# Patient Record
Sex: FEMALE | Race: WHITE | NOT HISPANIC OR LATINO | Employment: FULL TIME | URBAN - METROPOLITAN AREA
[De-identification: names, ages, dates, MRNs, and addresses within clinical notes are randomized per-mention and may not be internally consistent; named-entity substitution may affect disease eponyms.]

---

## 2017-06-22 ENCOUNTER — OFFICE VISIT (OUTPATIENT)
Dept: URGENT CARE | Facility: CLINIC | Age: 37
End: 2017-06-22
Payer: COMMERCIAL

## 2017-06-22 VITALS
OXYGEN SATURATION: 96 % | TEMPERATURE: 97.6 F | DIASTOLIC BLOOD PRESSURE: 62 MMHG | HEART RATE: 78 BPM | SYSTOLIC BLOOD PRESSURE: 98 MMHG | RESPIRATION RATE: 14 BRPM

## 2017-06-22 DIAGNOSIS — L55.9 SUNBURN: ICD-10-CM

## 2017-06-22 DIAGNOSIS — R05.8 POST-VIRAL COUGH SYNDROME: ICD-10-CM

## 2017-06-22 PROCEDURE — 99214 OFFICE O/P EST MOD 30 MIN: CPT | Performed by: PHYSICIAN ASSISTANT

## 2017-06-22 RX ORDER — BENZONATATE 100 MG/1
100 CAPSULE ORAL 3 TIMES DAILY PRN
Qty: 30 CAP | Refills: 0 | Status: SHIPPED | OUTPATIENT
Start: 2017-06-22 | End: 2018-10-31

## 2017-06-23 NOTE — PROGRESS NOTES
Chief Complaint   Patient presents with   • Cough     sore throat X 2 weeks        HISTORY OF PRESENT ILLNESS: Patient is a 36 y.o. female who presents today for 2 weeks of sore throat and coughing.   Patient states her cough has been persistent, dry and barking cough.   She states not productive really at all.     The last few days she reports that her sore throat is worsening where it had been improving overall prior.    Patient was getting sick with URI symptoms, sore throat and coughing towards the end of her trip in [place] 2 weeks ago.    She denies fevers or chills at anytime.   The cough is not really keeping her awake.   Her sore throat is better in the morning and she does not feel she is getting a lot of drainage nor is she clearing her throat a lot.  She does not feel SOB.  No chest discomfort.   She does not smoke and takes no medications.  No leg swelling.   Nothing really seeming to modify the symptoms.     Requests refill on silvadene cream she has on hand for sunburns.  Currently one she had two days ago is much better but ran out of cream    Patient Active Problem List    Diagnosis Date Noted   • Other constipation 07/18/2016   • Numerous moles 10/13/2014   • Blood in stool 10/13/2014       Allergies:Review of patient's allergies indicates no known allergies.    Current Outpatient Prescriptions Ordered in [facility]   Medication Sig Dispense Refill   • benzonatate (TESSALON) 100 MG Cap Take 1 Cap by mouth 3 times a day as needed for Cough. 30 Cap 0   • silver sulfADIAZINE (SILVADENE) 1 % Cream Apply 10 g to affected area(s) every day. 100 g 0     No current Epic-ordered facility-administered medications on file.       Past Medical History   Diagnosis Date   • Blood in stool 10/13/2014       Social History   Substance Use Topics   • Smoking status: Never Smoker    • Smokeless tobacco: Never Used   • Alcohol Use: 0.5 oz/week     1 Glasses of wine per week      Comment: rare       Family Status   Relation  Status Death Age   • Father Alive    • Maternal Grandmother     • Mother Alive    • Sister Alive    • Brother Alive    • Maternal Grandfather       accidental    • Paternal Grandmother     • Paternal Grandfather     • Brother Alive      Family History   Problem Relation Age of Onset   • Kidney Disease Father      no dialysis   • Heart Failure Maternal Grandmother    • Heart Disease Maternal Grandmother      chf   • Alzheimer's Disease Maternal Grandmother    • Thyroid Mother      hypothyroid   • Alzheimer's Disease Paternal Grandmother    • Diabetes Neg Hx    • Clotting Disorder Brother      DVT   • Hypertension Brother    • Hyperlipidemia Brother        ROS:  Review of Systems   Constitutional: Negative for fever, chills, weight loss and malaise/fatigue.   HENT:SEE HPI  Eyes: Negative for blurred vision.   Respiratory: SEE HPI  Cardiovascular: Negative for chest pain, palpitations, orthopnea and leg swelling.   Gastrointestinal: Negative for heartburn, nausea, vomiting and abdominal pain.   All other systems reviewed and are negative.       Exam:  Blood pressure 98/62, pulse 78, temperature 36.4 °C (97.6 °F), resp. rate 14, SpO2 96 %.  General:  Well nourished, well developed female in NAD  Eyes: PERRLA, EOM within normal limits, no conjunctival injection, no scleral icterus, visual fields and acuity grossly intact.  Ears: Normal shape and symmetry, no tenderness, no discharge. External canals are without any significant edema or erythema. Tympanic membranes are without any inflammation, no effusion. Gross auditory acuity is intact  Nose: Symmetrical, sinuses without tenderness, no discharge.   Mouth: reasonable hygiene, minimal posterior erythema without exudates or tonsillar enlargement.  Neck: no masses, range of motion within normal limits, no tracheal deviation. No lymphadenopathy  Pulmonary: Normal respiratory effort, no wheezes, crackles, or rhonchi.  Cardiovascular: regular  rate and rhythm without murmurs, rubs, or gallops..  Skin: . Warm, pink, dry.  Healing sunburn legs, trunk.  No active blisters.  No skin breakdown.   Extremities: no clubbing, cyanosis, or edema.  Neuro: A&O x 3. Speech normal/clear.  Normal gait.         Assessment/Plan:  1. Post-viral cough syndrome  benzonatate (TESSALON) 100 MG Cap    silver sulfADIAZINE (SILVADENE) 1 % Cream    POCT Rapid Strep A   2. Sunburn         -strep negative.   -benign exam.  Lungs CTA today.   -consistent with post viral cough syndrome, newer sore throat may be new viral URI.   -recommend salt gargles, fluids, rest.  -declines BMX.   She will use OTC  -declines chest x ray.   She will follow up, call/ RTC if continuing to lack improvement.   -requesting refill on Silvadene cream.  She has on hand in past for sunburns and did get a bad one a few days ago.  Much better but would like more on hand as she ran out today.   -refilled as above.            Supportive care, differential diagnoses, and indications for immediate follow-up discussed with patient.   Pathogenesis of diagnosis discussed including typical length and natural progression.   Instructed to return to clinic or nearest emergency department for any change in condition, further concerns, or worsening of symptoms.  Patient states understanding of the plan of care and discharge instructions.  Instructed to make an appointment, for follow up, with their primary care provider.      Eliz Golden PA-C

## 2017-08-23 ENCOUNTER — HOSPITAL ENCOUNTER (OUTPATIENT)
Dept: LAB | Facility: MEDICAL CENTER | Age: 37
End: 2017-08-23
Payer: COMMERCIAL

## 2017-08-23 LAB
BDY FAT % MEASURED: 23.6 %
BP DIAS: 61 MMHG
BP SYS: 97 MMHG
CHOLEST SERPL-MCNC: 158 MG/DL (ref 100–199)
DIABETES HTDIA: NO
EVENT NAME HTEVT: NORMAL
FASTING HTFAS: YES
GLUCOSE SERPL-MCNC: 92 MG/DL (ref 65–99)
HDLC SERPL-MCNC: 50 MG/DL
HYPERTENSION HTHYP: NO
LDLC SERPL CALC-MCNC: 88 MG/DL
SCREENING LOC CITY HTCIT: NORMAL
SCREENING LOC STATE HTSTA: NORMAL
SCREENING LOCATION HTLOC: NORMAL
SMOKING HTSMO: NO
SUBSCRIBER ID HTSID: NORMAL
TRIGL SERPL-MCNC: 102 MG/DL (ref 0–149)

## 2017-08-23 PROCEDURE — 80061 LIPID PANEL: CPT

## 2017-08-23 PROCEDURE — S5190 WELLNESS ASSESSMENT BY NONPH: HCPCS

## 2017-08-23 PROCEDURE — 82947 ASSAY GLUCOSE BLOOD QUANT: CPT

## 2017-08-23 PROCEDURE — 36415 COLL VENOUS BLD VENIPUNCTURE: CPT

## 2017-10-18 ENCOUNTER — IMMUNIZATION (OUTPATIENT)
Dept: OCCUPATIONAL MEDICINE | Facility: CLINIC | Age: 37
End: 2017-10-18

## 2017-10-18 DIAGNOSIS — Z23 NEED FOR VACCINATION: ICD-10-CM

## 2017-10-18 PROCEDURE — 90686 IIV4 VACC NO PRSV 0.5 ML IM: CPT | Performed by: PREVENTIVE MEDICINE

## 2018-03-13 ENCOUNTER — APPOINTMENT (RX ONLY)
Dept: URBAN - METROPOLITAN AREA CLINIC 4 | Facility: CLINIC | Age: 38
Setting detail: DERMATOLOGY
End: 2018-03-13

## 2018-03-13 DIAGNOSIS — L81.4 OTHER MELANIN HYPERPIGMENTATION: ICD-10-CM

## 2018-03-13 DIAGNOSIS — D22 MELANOCYTIC NEVI: ICD-10-CM

## 2018-03-13 DIAGNOSIS — D18.0 HEMANGIOMA: ICD-10-CM

## 2018-03-13 DIAGNOSIS — L82.1 OTHER SEBORRHEIC KERATOSIS: ICD-10-CM

## 2018-03-13 PROBLEM — D22.62 MELANOCYTIC NEVI OF LEFT UPPER LIMB, INCLUDING SHOULDER: Status: ACTIVE | Noted: 2018-03-13

## 2018-03-13 PROBLEM — D18.01 HEMANGIOMA OF SKIN AND SUBCUTANEOUS TISSUE: Status: ACTIVE | Noted: 2018-03-13

## 2018-03-13 PROBLEM — D22.61 MELANOCYTIC NEVI OF RIGHT UPPER LIMB, INCLUDING SHOULDER: Status: ACTIVE | Noted: 2018-03-13

## 2018-03-13 PROBLEM — D22.5 MELANOCYTIC NEVI OF TRUNK: Status: ACTIVE | Noted: 2018-03-13

## 2018-03-13 PROBLEM — D22.72 MELANOCYTIC NEVI OF LEFT LOWER LIMB, INCLUDING HIP: Status: ACTIVE | Noted: 2018-03-13

## 2018-03-13 PROBLEM — D22.71 MELANOCYTIC NEVI OF RIGHT LOWER LIMB, INCLUDING HIP: Status: ACTIVE | Noted: 2018-03-13

## 2018-03-13 PROCEDURE — ? COUNSELING

## 2018-03-13 PROCEDURE — ? SUNSCREEN RECOMMENDATIONS

## 2018-03-13 PROCEDURE — 99213 OFFICE O/P EST LOW 20 MIN: CPT

## 2018-03-13 ASSESSMENT — LOCATION DETAILED DESCRIPTION DERM
LOCATION DETAILED: RIGHT PROXIMAL DORSAL FOREARM
LOCATION DETAILED: LEFT INFERIOR ANTERIOR NECK
LOCATION DETAILED: LEFT SUPERIOR MEDIAL UPPER BACK
LOCATION DETAILED: LEFT PROXIMAL DORSAL FOREARM
LOCATION DETAILED: SUPERIOR THORACIC SPINE
LOCATION DETAILED: RIGHT CENTRAL MALAR CHEEK
LOCATION DETAILED: LEFT CENTRAL MALAR CHEEK
LOCATION DETAILED: RIGHT ANTERIOR PROXIMAL THIGH
LOCATION DETAILED: RIGHT DISTAL POSTERIOR UPPER ARM
LOCATION DETAILED: RIGHT RIB CAGE
LOCATION DETAILED: LEFT PROXIMAL POSTERIOR UPPER ARM
LOCATION DETAILED: LEFT ANTERIOR PROXIMAL THIGH
LOCATION DETAILED: LEFT ULNAR DORSAL HAND
LOCATION DETAILED: RIGHT RADIAL DORSAL HAND
LOCATION DETAILED: RIGHT SUPERIOR MEDIAL MIDBACK
LOCATION DETAILED: PERIUMBILICAL SKIN

## 2018-03-13 ASSESSMENT — LOCATION SIMPLE DESCRIPTION DERM
LOCATION SIMPLE: RIGHT POSTERIOR UPPER ARM
LOCATION SIMPLE: LEFT HAND
LOCATION SIMPLE: LEFT ANTERIOR NECK
LOCATION SIMPLE: LEFT UPPER BACK
LOCATION SIMPLE: RIGHT THIGH
LOCATION SIMPLE: RIGHT LOWER BACK
LOCATION SIMPLE: RIGHT CHEEK
LOCATION SIMPLE: LEFT POSTERIOR UPPER ARM
LOCATION SIMPLE: UPPER BACK
LOCATION SIMPLE: RIGHT HAND
LOCATION SIMPLE: ABDOMEN
LOCATION SIMPLE: LEFT FOREARM
LOCATION SIMPLE: RIGHT FOREARM
LOCATION SIMPLE: LEFT THIGH
LOCATION SIMPLE: LEFT CHEEK

## 2018-03-13 ASSESSMENT — LOCATION ZONE DERM
LOCATION ZONE: LEG
LOCATION ZONE: NECK
LOCATION ZONE: FACE
LOCATION ZONE: ARM
LOCATION ZONE: HAND
LOCATION ZONE: TRUNK

## 2018-04-11 ENCOUNTER — APPOINTMENT (RX ONLY)
Dept: URBAN - METROPOLITAN AREA CLINIC 20 | Facility: CLINIC | Age: 38
Setting detail: DERMATOLOGY
End: 2018-04-11

## 2018-04-11 DIAGNOSIS — Z41.9 ENCOUNTER FOR PROCEDURE FOR PURPOSES OTHER THAN REMEDYING HEALTH STATE, UNSPECIFIED: ICD-10-CM

## 2018-04-11 PROCEDURE — ? ADDITIONAL NOTES

## 2018-04-11 ASSESSMENT — LOCATION SIMPLE DESCRIPTION DERM: LOCATION SIMPLE: LEFT FOREHEAD

## 2018-04-11 ASSESSMENT — LOCATION DETAILED DESCRIPTION DERM: LOCATION DETAILED: LEFT MEDIAL FOREHEAD

## 2018-04-11 ASSESSMENT — LOCATION ZONE DERM: LOCATION ZONE: FACE

## 2018-04-11 NOTE — PROCEDURE: ADDITIONAL NOTES
Additional Notes: Plan: BBL face and neck. Getting  in July. Quoted $450. Treating cherry angiomas on abdomen and back for free.

## 2018-05-09 ENCOUNTER — APPOINTMENT (RX ONLY)
Dept: URBAN - METROPOLITAN AREA CLINIC 36 | Facility: CLINIC | Age: 38
Setting detail: DERMATOLOGY
End: 2018-05-09

## 2018-05-09 DIAGNOSIS — Z41.9 ENCOUNTER FOR PROCEDURE FOR PURPOSES OTHER THAN REMEDYING HEALTH STATE, UNSPECIFIED: ICD-10-CM

## 2018-05-09 PROCEDURE — ? ADDITIONAL NOTES

## 2018-05-09 PROCEDURE — ? SCITON BBL

## 2018-05-09 ASSESSMENT — LOCATION SIMPLE DESCRIPTION DERM: LOCATION SIMPLE: SUPERIOR FOREHEAD

## 2018-05-09 ASSESSMENT — LOCATION DETAILED DESCRIPTION DERM: LOCATION DETAILED: SUPERIOR MID FOREHEAD

## 2018-05-09 ASSESSMENT — LOCATION ZONE DERM: LOCATION ZONE: FACE

## 2018-05-09 NOTE — PROCEDURE: SCITON BBL
Pulse Duration Units: milliseconds
Post Procedure Text: The patient tolerated the procedure well. Ice-chilled washclothes were applied to the treatment area for comfort. Post care was reviewed with the patient.
Spot Size: Finesse Adapter Size: 15 x 45 mm (No Finesse Adapter)
Pulse Duration: 10
Cooling (In C): 20
Topical Anesthesia?: 23% lidocaine, 7% tetracaine
Repetition Rate (Hz): 1
Fluence (J/Cm2): 12
Length Of Topical Anesthesia Application (Optional): 30 minutes
Preprocedure Text: The treatment areas were thoroughly cleaned. Any exposed at risk hair that was not to be treated was covered in white paper tape. Clear ultrasound gel was applied to the treatment area. The area was treated with no immediate stacking of pulses.
Price (Use Numbers Only, No Special Characters Or $): 450.00
Anesthesia Volume In Cc: 0
Detail Level: Zone
Additional Comments (Optional): 15x15 square on forehead and nose
Post-Care Instructions: I reviewed with the patient in detail post-care instructions. Patient should stay away from the sun and wear sun protection until treated areas are fully healed.
Fluence (J/Cm2): 21
Consent: Written consent obtained, risks reviewed including but not limited to crusting, scabbing, blistering, scarring, darker or lighter pigmentary change, bruising, and/or incomplete response.
Cooling ?: Yes
Cooling (In C): 15
Fluence (J/Cm2): 25
Spot Size: Finesse Adapter Size: 15 x 15 mm square
Spot Size: Finesse Adapter Size: 7 mm round

## 2018-06-06 ENCOUNTER — HOSPITAL ENCOUNTER (OUTPATIENT)
Dept: RADIOLOGY | Facility: MEDICAL CENTER | Age: 38
End: 2018-06-06
Attending: NURSE PRACTITIONER
Payer: COMMERCIAL

## 2018-06-06 DIAGNOSIS — S62.201A CLOSED FRACTURE OF FIRST METACARPAL BONE OF RIGHT HAND, UNSPECIFIED FRACTURE MORPHOLOGY, UNSPECIFIED PORTION OF METACARPAL, INITIAL ENCOUNTER: ICD-10-CM

## 2018-06-06 PROCEDURE — 73130 X-RAY EXAM OF HAND: CPT | Mod: RT

## 2018-06-22 ENCOUNTER — HOSPITAL ENCOUNTER (OUTPATIENT)
Dept: LAB | Facility: MEDICAL CENTER | Age: 38
End: 2018-06-22
Payer: COMMERCIAL

## 2018-06-22 LAB
BDY FAT % MEASURED: 26.6 %
BP DIAS: 55 MMHG
BP SYS: 88 MMHG
CHOLEST SERPL-MCNC: 138 MG/DL (ref 100–199)
DIABETES HTDIA: NO
EVENT NAME HTEVT: NORMAL
FASTING HTFAS: YES
GLUCOSE SERPL-MCNC: 85 MG/DL (ref 65–99)
HDLC SERPL-MCNC: 46 MG/DL
HIP CIRCUMFERENCE HTHC: NORMAL IN
HYPERTENSION HTHYP: NO
LDLC SERPL CALC-MCNC: 76 MG/DL
SCREENING LOC CITY HTCIT: NORMAL
SCREENING LOC STATE HTSTA: NORMAL
SCREENING LOCATION HTLOC: NORMAL
SMOKING HTSMO: NO
SUBSCRIBER ID HTSID: NORMAL
TRIGL SERPL-MCNC: 82 MG/DL (ref 0–149)
WAIST CIRCUMFERENCE HTWC: NORMAL IN

## 2018-06-22 PROCEDURE — 36415 COLL VENOUS BLD VENIPUNCTURE: CPT

## 2018-06-22 PROCEDURE — S5190 WELLNESS ASSESSMENT BY NONPH: HCPCS

## 2018-06-22 PROCEDURE — 82947 ASSAY GLUCOSE BLOOD QUANT: CPT

## 2018-06-22 PROCEDURE — 80061 LIPID PANEL: CPT

## 2018-08-22 ENCOUNTER — HOSPITAL ENCOUNTER (OUTPATIENT)
Dept: RADIOLOGY | Facility: MEDICAL CENTER | Age: 38
End: 2018-08-22
Attending: SURGERY
Payer: COMMERCIAL

## 2018-08-22 DIAGNOSIS — M79.644 THUMB PAIN, RIGHT: ICD-10-CM

## 2018-08-22 PROCEDURE — 73218 MRI UPPER EXTREMITY W/O DYE: CPT | Mod: RT

## 2018-09-11 ENCOUNTER — HOSPITAL ENCOUNTER (OUTPATIENT)
Facility: MEDICAL CENTER | Age: 38
End: 2018-09-11
Attending: SURGERY | Admitting: SURGERY
Payer: COMMERCIAL

## 2018-09-21 ENCOUNTER — IMMUNIZATION (OUTPATIENT)
Dept: OCCUPATIONAL MEDICINE | Facility: CLINIC | Age: 38
End: 2018-09-21

## 2018-09-21 DIAGNOSIS — Z23 NEED FOR VACCINATION: ICD-10-CM

## 2018-09-30 PROCEDURE — 90686 IIV4 VACC NO PRSV 0.5 ML IM: CPT | Performed by: PREVENTIVE MEDICINE

## 2018-10-31 ENCOUNTER — APPOINTMENT (OUTPATIENT)
Dept: ADMISSIONS | Facility: MEDICAL CENTER | Age: 38
End: 2018-10-31
Attending: SURGERY
Payer: COMMERCIAL

## 2018-10-31 RX ORDER — IBUPROFEN 200 MG
200 TABLET ORAL EVERY 6 HOURS PRN
Status: ON HOLD | COMMUNITY
End: 2021-02-13

## 2018-11-07 ENCOUNTER — HOSPITAL ENCOUNTER (OUTPATIENT)
Facility: MEDICAL CENTER | Age: 38
End: 2018-11-07
Attending: SURGERY | Admitting: SURGERY
Payer: COMMERCIAL

## 2018-11-07 VITALS
OXYGEN SATURATION: 99 % | BODY MASS INDEX: 22.07 KG/M2 | WEIGHT: 112.43 LBS | HEIGHT: 60 IN | RESPIRATION RATE: 19 BRPM | TEMPERATURE: 98.1 F | HEART RATE: 68 BPM

## 2018-11-07 DIAGNOSIS — M65.9 SYNOVITIS OF RIGHT HAND: ICD-10-CM

## 2018-11-07 LAB — PATHOLOGY CONSULT NOTE: NORMAL

## 2018-11-07 PROCEDURE — 160028 HCHG SURGERY MINUTES - 1ST 30 MINS LEVEL 3: Performed by: SURGERY

## 2018-11-07 PROCEDURE — 160046 HCHG PACU - 1ST 60 MINS PHASE II: Performed by: SURGERY

## 2018-11-07 PROCEDURE — 88305 TISSUE EXAM BY PATHOLOGIST: CPT

## 2018-11-07 PROCEDURE — 160002 HCHG RECOVERY MINUTES (STAT): Performed by: SURGERY

## 2018-11-07 PROCEDURE — 160025 RECOVERY II MINUTES (STATS): Performed by: SURGERY

## 2018-11-07 PROCEDURE — 501838 HCHG SUTURE GENERAL: Performed by: SURGERY

## 2018-11-07 PROCEDURE — A6222 GAUZE <=16 IN NO W/SAL W/O B: HCPCS | Performed by: SURGERY

## 2018-11-07 PROCEDURE — 160035 HCHG PACU - 1ST 60 MINS PHASE I: Performed by: SURGERY

## 2018-11-07 PROCEDURE — 700111 HCHG RX REV CODE 636 W/ 250 OVERRIDE (IP)

## 2018-11-07 PROCEDURE — 84702 CHORIONIC GONADOTROPIN TEST: CPT

## 2018-11-07 PROCEDURE — 160009 HCHG ANES TIME/MIN: Performed by: SURGERY

## 2018-11-07 PROCEDURE — 500881 HCHG PACK, EXTREMITY: Performed by: SURGERY

## 2018-11-07 PROCEDURE — 160039 HCHG SURGERY MINUTES - EA ADDL 1 MIN LEVEL 3: Performed by: SURGERY

## 2018-11-07 PROCEDURE — 160048 HCHG OR STATISTICAL LEVEL 1-5: Performed by: SURGERY

## 2018-11-07 PROCEDURE — 700101 HCHG RX REV CODE 250

## 2018-11-07 PROCEDURE — 500431 HCHG DRESSING, KLING 2: Performed by: SURGERY

## 2018-11-07 RX ORDER — SODIUM CHLORIDE, SODIUM LACTATE, POTASSIUM CHLORIDE, CALCIUM CHLORIDE 600; 310; 30; 20 MG/100ML; MG/100ML; MG/100ML; MG/100ML
INJECTION, SOLUTION INTRAVENOUS CONTINUOUS
Status: DISCONTINUED | OUTPATIENT
Start: 2018-11-07 | End: 2018-11-07 | Stop reason: HOSPADM

## 2018-11-07 RX ORDER — HYDROCODONE BITARTRATE AND ACETAMINOPHEN 5; 325 MG/1; MG/1
1-2 TABLET ORAL EVERY 4 HOURS PRN
Qty: 20 TAB | Refills: 0 | Status: SHIPPED | OUTPATIENT
Start: 2018-11-07 | End: 2018-11-14

## 2018-11-07 RX ORDER — OXYCODONE HYDROCHLORIDE AND ACETAMINOPHEN 5; 325 MG/1; MG/1
2 TABLET ORAL
Status: DISCONTINUED | OUTPATIENT
Start: 2018-11-07 | End: 2018-11-07 | Stop reason: HOSPADM

## 2018-11-07 RX ORDER — OXYCODONE HYDROCHLORIDE AND ACETAMINOPHEN 5; 325 MG/1; MG/1
1 TABLET ORAL
Status: DISCONTINUED | OUTPATIENT
Start: 2018-11-07 | End: 2018-11-07 | Stop reason: HOSPADM

## 2018-11-07 RX ORDER — SODIUM CHLORIDE, SODIUM LACTATE, POTASSIUM CHLORIDE, CALCIUM CHLORIDE 600; 310; 30; 20 MG/100ML; MG/100ML; MG/100ML; MG/100ML
INJECTION, SOLUTION INTRAVENOUS ONCE
Status: COMPLETED | OUTPATIENT
Start: 2018-11-07 | End: 2018-11-07

## 2018-11-07 RX ORDER — KETOROLAC TROMETHAMINE 30 MG/ML
30 INJECTION, SOLUTION INTRAMUSCULAR; INTRAVENOUS ONCE
Status: DISCONTINUED | OUTPATIENT
Start: 2018-11-07 | End: 2018-11-07 | Stop reason: HOSPADM

## 2018-11-07 RX ORDER — BUPIVACAINE HYDROCHLORIDE AND EPINEPHRINE 5; 5 MG/ML; UG/ML
INJECTION, SOLUTION PERINEURAL
Status: DISCONTINUED | OUTPATIENT
Start: 2018-11-07 | End: 2018-11-07 | Stop reason: HOSPADM

## 2018-11-07 RX ORDER — ONDANSETRON 2 MG/ML
4 INJECTION INTRAMUSCULAR; INTRAVENOUS
Status: DISCONTINUED | OUTPATIENT
Start: 2018-11-07 | End: 2018-11-07 | Stop reason: HOSPADM

## 2018-11-07 RX ADMIN — SODIUM CHLORIDE, SODIUM LACTATE, POTASSIUM CHLORIDE, CALCIUM CHLORIDE: 600; 310; 30; 20 INJECTION, SOLUTION INTRAVENOUS at 08:55

## 2018-11-07 ASSESSMENT — PAIN SCALES - GENERAL
PAINLEVEL_OUTOF10: 0
PAINLEVEL_OUTOF10: 0

## 2018-11-07 NOTE — PROGRESS NOTES
[facility]. Report received and care assumed. Monitors on - VSS, breathing even and unlabored. Rt hand elevated on a pillow with ice applied - NANCY dressing. csm AND cvr GOOD  0952 -  at bedside  1040 discharge instructions reviewed with pt and family. All questions and concerns addressed.  1045 pt discharged  to private car - acc by family

## 2018-11-07 NOTE — DISCHARGE INSTRUCTIONS
ACTIVITY: Rest and take it easy for the first 24 hours.  A responsible adult is recommended to remain with you during that time.  It is normal to feel sleepy.  We encourage you to not do anything that requires balance, judgment or coordination.    MILD FLU-LIKE SYMPTOMS ARE NORMAL. YOU MAY EXPERIENCE GENERALIZED MUSCLE ACHES, THROAT IRRITATION, HEADACHE AND/OR SOME NAUSEA.    FOR 24 HOURS DO NOT:  Drive, operate machinery or run household appliances.  Drink beer or alcoholic beverages.   Make important decisions or sign legal documents.    SPECIAL INSTRUCTIONS:PER MD    DIET: To avoid nausea, slowly advance diet as tolerated, avoiding spicy or greasy foods for the first day.  Add more substantial food to your diet according to your physician's instructions.  Babies can be fed formula or breast milk as soon as they are hungry.  INCREASE FLUIDS AND FIBER TO AVOID CONSTIPATION.    SURGICAL DRESSING/BATHING: PER MD  Okay to remove bandage in 3 days and get incision wet and cover with bandaids.     Elevate above heart and ice frequently for at least 2 weeks. Encourage sedentary use of hand and frequent moving of fingers to prevent stiffness.     No heavy lifting or grasping for at least 4 weeks.     No driving while on narcotic pain medications. Contact auto-insurance carrier for clearance to begin driving again.     Call MD or come to ER for any major concerns.           FOLLOW-UP APPOINTMENT:  A follow-up appointment should be arranged with your doctor in PER MD; call to schedule.    You should CALL YOUR PHYSICIAN if you develop:  Fever greater than 101 degrees F.  Pain not relieved by medication, or persistent nausea or vomiting.  Excessive bleeding (blood soaking through dressing) or unexpected drainage from the wound.  Extreme redness or swelling around the incision site, drainage of pus or foul smelling drainage.  Inability to urinate or empty your bladder within 8 hours.  Problems with breathing or chest  pain.    You should call 911 if you develop problems with breathing or chest pain.  If you are unable to contact your doctor or surgical center, you should go to the nearest emergency room or urgent care center.  Physician's telephone #: [phone]    If any questions arise, call your doctor.  If your doctor is not available, please feel free to call the Surgical Center at [phone].  The Center is open Monday through Friday from 7AM to 7PM.  You can also call the HEALTH HOTLINE open 24 hours/day, 7 days/week and speak to a nurse at [phone], or toll free at [phone].    A registered nurse may call you a few days after your surgery to see how you are doing after your procedure.    MEDICATIONS: Resume taking daily medication.  Take prescribed pain medication with food.  If no medication is prescribed, you may take non-aspirin pain medication if needed.  PAIN MEDICATION CAN BE VERY CONSTIPATING.  Take a stool softener or laxative such as senokot, pericolace, or milk of magnesia if needed.    Prescription given for NORCO.  Last pain medication given at .    If your physician has prescribed pain medication that includes Acetaminophen (Tylenol), do not take additional Acetaminophen (Tylenol) while taking the prescribed medication.    Depression / Suicide Risk    As you are discharged from this [facility], it is important to learn how to keep safe from harming yourself.    Recognize the warning signs:  · Abrupt changes in personality, positive or negative- including increase in energy   · Giving away possessions  · Change in eating patterns- significant weight changes-  positive or negative  · Change in sleeping patterns- unable to sleep or sleeping all the time   · Unwillingness or inability to communicate  · Depression  · Unusual sadness, discouragement and loneliness  · Talk of wanting to die  · Neglect of personal appearance   · Rebelliousness- reckless behavior  · Withdrawal from  people/activities they love  · Confusion- inability to concentrate     If you or a loved one observes any of these behaviors or has concerns about self-harm, here's what you can do:  · Talk about it- your feelings and reasons for harming yourself  · Remove any means that you might use to hurt yourself (examples: pills, rope, extension cords, firearm)  · Get professional help from the community (Mental Health, Substance Abuse, psychological counseling)  · Do not be alone:Call your Safe Contact- someone whom you trust who will be there for you.  · Call your local CRISIS HOTLINE 304-1557 or 905-753-7652  · Call your local Children's Mobile Crisis Response Team [place] (743) 809-0653 or www.[employer]  · Call the toll free National Suicide Prevention Hotlines   · National Suicide Prevention Lifeline 241-616-ZAHO (8981)  · National Hope Line Network 800-SUICIDE (559-2248)

## 2018-11-07 NOTE — OP REPORT
DATE OF SERVICE:  11/07/2018    SURGEON:  Braxton Aggarwal MD    ASSISTANT:  None.    PREOPERATIVE DIAGNOSIS:  Right thumb interphalangeal joint synovitis.    POSTOPERATIVE DIAGNOSES:  Right thumb interphalangeal joint synovitis.    PROCEDURE:  Right thumb exploration and excisional biopsy of IP joint   synovitis.    ANESTHESIOLOGIST:  Leroy Jiménez MD    ANESTHESIA:  General.    COMPLICATIONS:  None noted.    DRAINS:  None.    SPECIMENS:  Synovitic mass from the IP joint.    ESTIMATED BLOOD LOSS:  Minimal.    TOURNIQUET TIME:  Finger tourniquet for approximately 10 minutes.    INDICATIONS:  The patient is a 38-year-old female well known to me through my   outpatient clinic for the above-mentioned diagnosis.  We discussed continued   conservative versus operative treatment.  After discussing advantages and   disadvantages of each, she elected to proceed with the above-mentioned   procedure.  Prior to the procedure, she understood the risks, benefits and   alternatives to surgery.  She understood the risks to include, but not limited   to the risk of infection requiring repeat surgery, bleeding requiring blood   transfusion, nerve, blood vessel, tendon injury requiring repair, chronic   pain, recurrence of mass or finding mass to be malignancy, requiring salvage   procedure of thumb amputation, DVT, pulmonary embolism, heart attack, stroke,   even death.  Patient states despite these risks, she wished to proceed with   surgery.    DESCRIPTION OF PROCEDURE:  The patient was met in the preoperative holding   area.  Her right thumb was initialed as correct operative site.  She had an   opportunity to ask questions, all questions were answered and informed consent   was obtained.    Patient was brought to the operating room and laid supine on the operating   table.  All bony and dependent prominences were padded.  General anesthesia   was induced without complication.  Ancef was administered for infection    prophylaxis.  Right upper extremity was prepped and draped in the usual   sterile fashion.  A formal time-out was performed, which all parties agreed   upon the correct patient, procedure, and operative site.  I began the   procedure by infiltrating subcutaneous tissues with 20 mL of 0.5% Marcaine   with epinephrine.  I then applied finger tourniquet of the thumb.  I made an   L-shaped incision over the visible and palpable mass synovitis emanating from   the IP joint.  I identified and protected the extensor tendon.  Both sides of   the extensor tendon, I excised the capsule and underlying synovium where there   was a synovitic mass on both sides of the tendon.  I then used a rongeur to   remove the synovium from the undersurface of the tendon while preserving the   tendon as well as from the parameter of the joint while preserving underlying   cartilage, which was intact.  I then copiously irrigated the wound with normal   saline and sent the specimen for pathology.  I then remove the finger   tourniquet, used Bovie cautery to achieve hemostasis and closed the incision   with 4-0 nylon suture, covered with sterile bandage.  The patient awoke from   anesthesia without any complication and was transferred in a stable condition   to PACU where there were no immediate post-term complaints.    POSTOPERATIVE PLAN:  The patient will be discharged home per same day   criteria, sedentary use of the upper extremity and follow up in clinic in   10-14 days for suture removal and wound check.       ____________________________________     MD VIDHI Dee / ROSITA    DD:  11/07/2018 10:44:16  DT:  11/07/2018 11:04:54    D#:  [id number]  Job#:  [id number]

## 2018-11-08 LAB
B-HCG FREE SERPL-ACNC: <5 MIU/ML
IHCGL IHCGL: NEGATIVE MIU/ML

## 2019-01-02 ENCOUNTER — APPOINTMENT (OUTPATIENT)
Dept: OTHER | Facility: IMAGING CENTER | Age: 39
End: 2019-01-02

## 2019-01-09 ENCOUNTER — APPOINTMENT (OUTPATIENT)
Dept: OTHER | Facility: IMAGING CENTER | Age: 39
End: 2019-01-09

## 2019-01-21 ENCOUNTER — OFFICE VISIT (OUTPATIENT)
Dept: OTHER | Facility: IMAGING CENTER | Age: 39
End: 2019-01-21
Payer: COMMERCIAL

## 2019-01-21 VITALS
OXYGEN SATURATION: 97 % | RESPIRATION RATE: 12 BRPM | HEIGHT: 60 IN | DIASTOLIC BLOOD PRESSURE: 60 MMHG | TEMPERATURE: 97.9 F | BODY MASS INDEX: 23.42 KG/M2 | WEIGHT: 119.27 LBS | HEART RATE: 68 BPM | SYSTOLIC BLOOD PRESSURE: 90 MMHG

## 2019-01-21 DIAGNOSIS — Z00.00 HEALTH CARE MAINTENANCE: ICD-10-CM

## 2019-01-21 DIAGNOSIS — Z31.69 ENCOUNTER FOR PRECONCEPTION CONSULTATION: ICD-10-CM

## 2019-01-21 DIAGNOSIS — M65.9 SYNOVITIS OF FINGER: ICD-10-CM

## 2019-01-21 DIAGNOSIS — M25.612 DECREASED RANGE OF MOTION OF LEFT SHOULDER: ICD-10-CM

## 2019-01-21 PROCEDURE — 99204 OFFICE O/P NEW MOD 45 MIN: CPT | Performed by: INTERNAL MEDICINE

## 2019-01-21 ASSESSMENT — PAIN SCALES - GENERAL: PAINLEVEL: 3=SLIGHT PAIN

## 2019-01-21 ASSESSMENT — PATIENT HEALTH QUESTIONNAIRE - PHQ9: CLINICAL INTERPRETATION OF PHQ2 SCORE: 0

## 2019-01-21 NOTE — LETTER
[facility]  Erik Vazquez M.D.  [address] [address]  Fax: [phone]   Authorization for Release/Disclosure of   Protected Health Information   Name: PAULINE DESAI : 1980 SSN: [id number]   Address: [address] [facility] [address] Phone:    [phone] (home)    I authorize the entity listed below to release/disclose the PHI below to:   [facility]/Erik Vazquez M.D. and Caridad Tony D.O.   Provider or Entity Name:     Address   City, State, Zip   Phone:      Fax:     Reason for request: continuity of care   Information to be released:    [  ] LAST COLONOSCOPY,  including any PATH REPORT and follow-up  [  ] LAST FIT/COLOGUARD RESULT [  ] LAST DEXA  [  ] LAST MAMMOGRAM  [  ] LAST PAP  [  ] LAST LABS [  ] RETINA EXAM REPORT  [  ] IMMUNIZATION RECORDS  [  ] Release all info      [  ] Check here and initial the line next to each item to release ALL health information INCLUDING  _____ Care and treatment for drug and / or alcohol abuse  _____ HIV testing, infection status, or AIDS  _____ Genetic Testing    DATES OF SERVICE OR TIME PERIOD TO BE DISCLOSED: _____________  I understand and acknowledge that:  * This Authorization may be revoked at any time by you in writing, except if your health information has already been used or disclosed.  * Your health information that will be used or disclosed as a result of you signing this authorization could be re-disclosed by the recipient. If this occurs, your re-disclosed health information may no longer be protected by State or Federal laws.  * You may refuse to sign this Authorization. Your refusal will not affect your ability to obtain treatment.  * This Authorization becomes effective upon signing and will  on (date) __________.      If no date is indicated, this Authorization will  one (1) year from the signature date.    Name: Pauline Desai    Signature:   Date:     2019       PLEASE FAX REQUESTED RECORDS BACK TO:  [phone]

## 2019-01-22 NOTE — PROGRESS NOTES
"Chief Complaint   Patient presents with   • Finger Pain     right thumb since surgery 11/7/18 (following pain that began in 04/18)   • Shoulder Pain     left shoulder, chronic pain-overuse injury. loss of ROM in left shoulder     Paluine Hoffman is a 38 y.o. female who usually sees Erik Vazquez M.D. presents today to establish care at [facility] and to discuss.    HPI:  Patient complains of right thumb pain that occurred since April 2018 after some yard work.  She did not recall any specific injury.  Her thumb became swollen the next week and continue to be irritated in the distal interphalangeal joint.  Has difficulty with flexion of the joint.  She had treated conservatively with ice and NSAIDs which did not seem to help with swelling much.  After 1-1/2 months was seen by her primary care doctor and had an x-ray which showed essentially normal results.  Had follow-up with orthopedist, Dr. Roman and MRI done.  Insurance denied needle biopsy procedure and she was recommended to have surgery (right thumb exploration and excisional biopsy of IP joint synovitis) which was done in November 2018.  Findings showed: Right thumb interphalangeal joint synovitis.   Current symptoms: Pain is 4-5 out of 10 and sometimes 8 out of 10.  Location at DIP joint on extensor side as well as on flexor side.  Patient is right-handed and finds it painful to flex thumb at DIP.  Found that her range of motion is restricted more than prior to the surgery.  Has recently started hand therapy which she is continuing to attend twice a week.  Having some increase in range of motion with this.  She has had 2 sessions of acupuncture and has not noticed much improvement yet.  Continues to use ibuprofen as needed 400-1200-day.  Previously used Norco but did not like the side effects.  Briefly try to CBD oil with some temporary relief.  Has been using glucosamine supplement \"Move free\" since surgery.    MRI right thumb 8/22/2018  Marrow edema " and periostitis with surrounding soft tissue edema involving the first proximal phalanx diffusely. In the absence of trauma, differential diagnosis includes osteomyelitis as well as an underlying neoplastic process.    Patient also complains of left shoulder/scapular discomfort over the last few years.  Also has decreased range of motion through the patient used to be a diver and a gymnast.  She used to be active in acro-yoga but unable to do it due to decreased function-some difficulty with bearing weight and pushing through range of motion. Difficulty with overhead activities due to decreased range of motion.  Denies neck pain or upper back pain.  Mild weakness on left, no paresthesias.  States that her father is a physical therapist and had given her some exercises to do for possible scapular problem.  Denies lower back or upper back pain.    Newly . Preparing to conceive for first child and requesting recommendation for prenatal vitamin.    Health care maintenance  Health Maintenance Due   Topic Date Due   • PAP SMEAR  11/01/2018   Seeing dermatology for regular skin exams.  Has gynecologist.  Last Pap February 2018.  Scheduled for April 2019.  Biometrics done in June 2018    Lifestyle  Exercise: skiing, light weights, yoga - summer: kayaking  Diet: lean proteins. Usually skips breakfast. See ipc intake form for details.  Sleep: regular 7-8 hrs a night on average - usually more on weekends. Fall asleep fast - no issues.  Relaxation: anything on the water, travelling.  Relationships:  Supportive family - friend network (here and out of state). Lives with spouse and Raymond Richardson.  Current Stressors: None  Prior experience with complementary therapies: NAET allergy elimination, bowel issues - acupuncture, amnual therapy, craniosacral and myofascial release, yoga and meditation    Depression Screening    Little interest or pleasure in doing things?  0 - not at all  Feeling down, depressed , or  hopeless? 0 - not at all  Patient Health Questionnaire Score: 0    If depressive symptoms identified deferred to follow up visit unless specifically addressed in assesment and plan.    Interpretation of PHQ-9 Total Score   Score Severity   1-4 Minimal Depression   5-9 Mild Depression   10-14 Moderate Depression   15-19 Moderately Severe Depression   20-27 Severe Depression     Current medicines (including changes today)  Current Outpatient Prescriptions   Medication Sig Dispense Refill   • ibuprofen (MOTRIN) 200 MG Tab Take 200 mg by mouth every 6 hours as needed.       No current facility-administered medications for this visit.      She  has a past medical history of Blood in stool (10/13/2014). She also has no past medical history of Anemia; Anxiety; Arrhythmia; Asthma; Blood transfusion without reported diagnosis; Cancer (HCC); Cataract; CHF (congestive heart failure) (HCC); Clotting disorder (HCC); COPD (chronic obstructive pulmonary disease) (HCC); Depression; Diabetes (HCC); Diabetic neuropathy (HCC); GERD (gastroesophageal reflux disease); Glaucoma; Goiter; Heart attack (HCC); Heart murmur; Hyperlipidemia; Hypertension; IBD (inflammatory bowel disease); Kidney disease; Migraine; Muscle disorder; Osteoporosis; Pulmonary emphysema (HCC); Seizure (HCC); Stroke (HCC); Substance abuse (HCC); or Tuberculosis.  She  has a past surgical history that includes leep (2007) and finger exploration (Right, 11/7/2018).  Social History   Substance Use Topics   • Smoking status: Never Smoker   • Smokeless tobacco: Never Used   • Alcohol use 0.5 oz/week     1 Glasses of wine per week      Comment: 1-2 per week     Family History   Problem Relation Age of Onset   • Kidney Disease Father 65        no dialysis   • Heart Failure Maternal Grandmother    • Heart Disease Maternal Grandmother         chf   • Alzheimer's Disease Maternal Grandmother    • Thyroid Mother         hypothyroid   • Hypertension Mother    • Hyperlipidemia  Mother    • No Known Problems Sister    • Alzheimer's Disease Paternal Grandmother    • Lung Cancer Paternal Grandfather    • Clotting Disorder Brother         DVT   • Hypertension Brother    • Hyperlipidemia Brother    • GI Brother         bowel obstruction   • Diabetes Neg Hx      Family Status   Relation Status   • Fa Alive   • MGMo    • Mo Alive   • Sis Alive   • Bro Alive   • MGFa         accidental    • PGMo    • PGFa    • Bro Alive   • Neg Hx (Not Specified)     Social History     Social History Narrative   • No narrative on file     ROS  Constitutional: Negative for fever, chills, weight loss and malaise/fatigue.   HENT: Negative for ear pain, nosebleeds, congestion, sore throat and neck pain.    Eyes: Negative for blurred vision.   Respiratory: Negative for cough, sputum production, shortness of breath and wheezing.    Cardiovascular: Negative for chest pain, palpitations, orthopnea and leg swelling.   Gastrointestinal: Negative for heartburn, nausea, vomiting and abdominal pain. Constipation more often.   Genitourinary: Negative for dysuria, urgency and frequency.   Musculoskeletal: Negative for myalgias, back pain and thumb joint pain as per hpi.   Skin: Negative for rash and itching.   Neurological: Negative for dizziness, tingling, tremors, sensory change, focal weakness and headaches.   Endo/Heme/Allergies: Does not bruise/bleed easily.   Psychiatric/Behavioral: Negative for depression, anxiety, or memory loss.     All other systems reviewed and are negative except as in HPI.    Labs reviewed with patient:  Lab Results   Component Value Date/Time    WBC 8.3 2016 10:27 AM    RBC 4.35 2016 10:27 AM    HEMOGLOBIN 14.3 2016 10:27 AM    HEMATOCRIT 41.6 2016 10:27 AM    MCV 95.6 2016 10:27 AM    MCH 32.9 2016 10:27 AM    MCHC 34.4 2016 10:27 AM    MPV 11.1 2016 10:27 AM    NEUTSPOLYS 66.70 2016 10:27 AM    LYMPHOCYTES 23.10  07/18/2016 10:27 AM    MONOCYTES 7.70 07/18/2016 10:27 AM    EOSINOPHILS 1.40 07/18/2016 10:27 AM    BASOPHILS 0.60 07/18/2016 10:27 AM      Lab Results   Component Value Date/Time    SODIUM 139 09/08/2014 12:06 PM    POTASSIUM 4.8 09/08/2014 12:06 PM    CHLORIDE 108 09/08/2014 12:06 PM    CO2 26 09/08/2014 12:06 PM    GLUCOSE 85 06/22/2018 07:57 AM    BUN 13 09/08/2014 12:06 PM    CREATININE 0.77 09/08/2014 12:06 PM    ALKPHOSPHAT 53 09/08/2014 12:06 PM    ASTSGOT 14 09/08/2014 12:06 PM    ALTSGPT 10 09/08/2014 12:06 PM    TBILIRUBIN 1.0 09/08/2014 12:06 PM     Lab Results   Component Value Date/Time    CHOLSTRLTOT 138 06/22/2018 07:57 AM    CHOLSTRLTOT 158 08/23/2017 10:58 AM     Lab Results   Component Value Date/Time    LDL 76 06/22/2018 07:57 AM    LDL 88 08/23/2017 10:58 AM     Lab Results   Component Value Date/Time    HDL 46 06/22/2018 07:57 AM    HDL 50 08/23/2017 10:58 AM     Lab Results   Component Value Date/Time    TRIGLYCERIDE 82 06/22/2018 07:57 AM    TRIGLYCERIDE 102 08/23/2017 10:58 AM     No results found for: HBA1C  Lab Results   Component Value Date/Time    TSHULTRASEN 0.700 09/08/2014 12:06 PM        Objective:   Blood pressure (!) 90/60, pulse 68, temperature 36.6 °C (97.9 °F), resp. rate 12, height 1.524 m (5'), weight 54.1 kg (119 lb 4.3 oz), last menstrual period 01/13/2019, SpO2 97 %, not currently breastfeeding. Body mass index is 23.29 kg/m².  Physical Exam:  Constitutional: Alert, no distress.  Skin: Warm, dry, good turgor, no rashes in visible areas.  Eye: Equal, round and reactive, conjunctiva clear, lids normal.  ENMT: Lips without lesions, good dentition, oropharynx clear.  Neck: Trachea midline, no masses, no thyromegaly.  Respiratory: Unlabored respiratory effort, lungs clear to auscultation, no wheezes, no ronchi.  Cardiovascular: Normal S1, S2, no murmur, no edema.  Abdomen: Soft, non-tender, no masses, no hepatosplenomegaly.  Psych: Alert and oriented x3, normal affect and  mood.  Shoulder Exam: no swelling, tenderness, instability; ligaments intact, FROM shoulder joint, no bicipital groove tenderness, no impingements signs, remainder of shoulder exam is normal, contralateral shoulder exam is normal. Passive ROM: forward flexion (160`), external rotation (60`) internal rotation (T12)  MSK: Swelling of right DIP first digit and decreased flexion (passive and active)    Assessment and Plan:   The following treatment plan was discussed  1. Decreased range of motion of left shoulder  DX-SHOULDER 2+ LEFT    DX-THORACIC SPINE-2 VIEWS   2. Synovitis of finger     3. Encounter for preconception consultation     4. Health care maintenance  COMP METABOLIC PANEL     1. Plain films ordered for further evaluation. Differential diagnosis: subacromial pathology, adhesive capsulitis, thoracic spine problem or musculoskeletal dysfunction. Discussed initial treatment options including PT, trial of acupuncture and/or OMT.  2. Right thumb interphalangeal joint synovitis s/p right thumb exploration and excisional biopsy of IP joint. Ibuprofen as needed. Discussed trial of SAMe which may help reduce pain and may decrease need for frequent NSAID. She could continue glucosamine for now. Check renal function.  3. Discussed recommended amount of folate, iodine and iron, choline to look for in prenatal vitamin.   4. Discussed CDC recommendations for immunizations and USPSTF guidelines for screening exams.      Records requested.  Followup: Return in about 1 month (around 2/21/2019), or if symptoms worsen or fail to improve, for follow-up with PCP.    Spent 45 minutes in face-to-tace patient contact in which greater than 50% of the visit was spent in counseling and coordination of care including patient education about treatment options, discussed the benefits and utility of acupuncture.  I answered patient questions about efficacy, safety, and what to expect during a treatment, and the likely number of  treatments needed. We also reviewed PMH, family history, and each of her chronic diagnoses in regards to current management, specialty physicians, symptom control, and future planning.  Please refer to assessment and plan/discussion/recommendations for additional details.        I have worked with consultants from the vendor as well as technical experts from [employer] to optimize the interface. I have made every reasonable attempt to correct obvious errors, but I expect that there are errors of grammar and possibly content that I did not discover before finalizing the note.

## 2019-01-23 ENCOUNTER — APPOINTMENT (OUTPATIENT)
Dept: OTHER | Facility: IMAGING CENTER | Age: 39
End: 2019-01-23

## 2019-01-29 NOTE — ASSESSMENT & PLAN NOTE
Health Maintenance Due   Topic Date Due   • PAP SMEAR  11/01/2018   Seeing dermatology for regular skin exams.  Has gynecologist.  Last Pap February 2018.  Scheduled for April 2019.  Biometrics done in June 2018    Lifestyle  Exercise: skiing, light weights, yoga - summer: kayaking  Diet: lean proteins. Usually skips breakfast. See ipc intake form for details.  Sleep: regular 7-8 hrs a night on average - usually more on weekends. Fall asleep fast - no issues.  Relaxation: anything on the water, travelling.  Relationships:  Supportive family - friend network (here and out of state). Lives with spouse and Raymond Richardson.  Current Stressors: None  Prior experience with complementary therapies: NAET allergy elimination, bowel issues - acupuncture, amnual therapy, craniosacral and myofascial release, yoga and meditation

## 2019-01-30 ENCOUNTER — APPOINTMENT (OUTPATIENT)
Dept: OTHER | Facility: IMAGING CENTER | Age: 39
End: 2019-01-30

## 2019-02-07 ENCOUNTER — HOSPITAL ENCOUNTER (OUTPATIENT)
Dept: LAB | Facility: MEDICAL CENTER | Age: 39
End: 2019-02-07
Attending: INTERNAL MEDICINE
Payer: COMMERCIAL

## 2019-02-07 DIAGNOSIS — Z00.00 HEALTH CARE MAINTENANCE: ICD-10-CM

## 2019-02-07 LAB
ALBUMIN SERPL BCP-MCNC: 4.3 G/DL (ref 3.2–4.9)
ALBUMIN/GLOB SERPL: 1.5 G/DL
ALP SERPL-CCNC: 50 U/L (ref 30–99)
ALT SERPL-CCNC: 12 U/L (ref 2–50)
ANION GAP SERPL CALC-SCNC: 6 MMOL/L (ref 0–11.9)
AST SERPL-CCNC: 14 U/L (ref 12–45)
BILIRUB SERPL-MCNC: 0.7 MG/DL (ref 0.1–1.5)
BUN SERPL-MCNC: 13 MG/DL (ref 8–22)
CALCIUM SERPL-MCNC: 9.6 MG/DL (ref 8.5–10.5)
CHLORIDE SERPL-SCNC: 109 MMOL/L (ref 96–112)
CO2 SERPL-SCNC: 25 MMOL/L (ref 20–33)
CREAT SERPL-MCNC: 0.73 MG/DL (ref 0.5–1.4)
FASTING STATUS PATIENT QL REPORTED: NORMAL
GLOBULIN SER CALC-MCNC: 2.8 G/DL (ref 1.9–3.5)
GLUCOSE SERPL-MCNC: 87 MG/DL (ref 65–99)
POTASSIUM SERPL-SCNC: 4.7 MMOL/L (ref 3.6–5.5)
PROT SERPL-MCNC: 7.1 G/DL (ref 6–8.2)
SODIUM SERPL-SCNC: 140 MMOL/L (ref 135–145)

## 2019-02-07 PROCEDURE — 36415 COLL VENOUS BLD VENIPUNCTURE: CPT

## 2019-02-07 PROCEDURE — 80053 COMPREHEN METABOLIC PANEL: CPT

## 2019-02-10 ENCOUNTER — HOSPITAL ENCOUNTER (OUTPATIENT)
Dept: RADIOLOGY | Facility: MEDICAL CENTER | Age: 39
End: 2019-02-10
Attending: INTERNAL MEDICINE
Payer: COMMERCIAL

## 2019-02-10 DIAGNOSIS — M25.612 DECREASED RANGE OF MOTION OF LEFT SHOULDER: ICD-10-CM

## 2019-02-10 PROCEDURE — 72070 X-RAY EXAM THORAC SPINE 2VWS: CPT

## 2019-02-10 PROCEDURE — 73030 X-RAY EXAM OF SHOULDER: CPT | Mod: LT

## 2019-03-11 ENCOUNTER — TELEPHONE (OUTPATIENT)
Dept: OTHER | Facility: IMAGING CENTER | Age: 39
End: 2019-03-11

## 2019-03-11 ENCOUNTER — NON-PROVIDER VISIT (OUTPATIENT)
Dept: OCCUPATIONAL MEDICINE | Facility: CLINIC | Age: 39
End: 2019-03-11

## 2019-03-11 DIAGNOSIS — Z11.1 ENCOUNTER FOR PPD TEST: ICD-10-CM

## 2019-03-11 PROCEDURE — 86580 TB INTRADERMAL TEST: CPT | Performed by: PREVENTIVE MEDICINE

## 2019-03-11 NOTE — TELEPHONE ENCOUNTER
Voicemail received from pt inquiring if Dr. Tony can fill out paperwork for employment. Advised pt to send an attachment via [employer] for Dr. Tony to review.

## 2019-03-13 ENCOUNTER — NON-PROVIDER VISIT (OUTPATIENT)
Dept: OCCUPATIONAL MEDICINE | Facility: CLINIC | Age: 39
End: 2019-03-13

## 2019-03-13 DIAGNOSIS — Z11.1 ENCOUNTER FOR PPD SKIN TEST READING: ICD-10-CM

## 2019-03-13 LAB — TB WHEAL 3D P 5 TU DIAM: NORMAL MM

## 2019-03-18 ENCOUNTER — NON-PROVIDER VISIT (OUTPATIENT)
Dept: OCCUPATIONAL MEDICINE | Facility: CLINIC | Age: 39
End: 2019-03-18

## 2019-03-18 DIAGNOSIS — Z11.1 ENCOUNTER FOR PPD TEST: ICD-10-CM

## 2019-03-18 PROCEDURE — 86580 TB INTRADERMAL TEST: CPT | Performed by: PREVENTIVE MEDICINE

## 2019-03-20 ENCOUNTER — NON-PROVIDER VISIT (OUTPATIENT)
Dept: OCCUPATIONAL MEDICINE | Facility: CLINIC | Age: 39
End: 2019-03-20

## 2019-03-20 DIAGNOSIS — Z11.1 ENCOUNTER FOR PPD SKIN TEST READING: ICD-10-CM

## 2019-03-20 LAB — TB WHEAL 3D P 5 TU DIAM: NORMAL MM

## 2019-03-29 ENCOUNTER — HOSPITAL ENCOUNTER (OUTPATIENT)
Dept: LAB | Facility: MEDICAL CENTER | Age: 39
End: 2019-03-29
Attending: FAMILY MEDICINE
Payer: COMMERCIAL

## 2019-03-29 LAB
25(OH)D3 SERPL-MCNC: 29 NG/ML (ref 30–100)
ALBUMIN SERPL BCP-MCNC: 4 G/DL (ref 3.2–4.9)
ALBUMIN/GLOB SERPL: 1.5 G/DL
ALP SERPL-CCNC: 45 U/L (ref 30–99)
ALT SERPL-CCNC: 12 U/L (ref 2–50)
ANION GAP SERPL CALC-SCNC: 8 MMOL/L (ref 0–11.9)
AST SERPL-CCNC: 17 U/L (ref 12–45)
BASOPHILS # BLD AUTO: 0.6 % (ref 0–1.8)
BASOPHILS # BLD: 0.04 K/UL (ref 0–0.12)
BILIRUB SERPL-MCNC: 0.7 MG/DL (ref 0.1–1.5)
BUN SERPL-MCNC: 13 MG/DL (ref 8–22)
CALCIUM SERPL-MCNC: 8.9 MG/DL (ref 8.5–10.5)
CHLORIDE SERPL-SCNC: 112 MMOL/L (ref 96–112)
CHOLEST SERPL-MCNC: 151 MG/DL (ref 100–199)
CO2 SERPL-SCNC: 21 MMOL/L (ref 20–33)
CREAT SERPL-MCNC: 0.66 MG/DL (ref 0.5–1.4)
EOSINOPHIL # BLD AUTO: 0.1 K/UL (ref 0–0.51)
EOSINOPHIL NFR BLD: 1.5 % (ref 0–6.9)
ERYTHROCYTE [DISTWIDTH] IN BLOOD BY AUTOMATED COUNT: 41.1 FL (ref 35.9–50)
FASTING STATUS PATIENT QL REPORTED: NORMAL
FERRITIN SERPL-MCNC: 47.8 NG/ML (ref 10–291)
GLOBULIN SER CALC-MCNC: 2.7 G/DL (ref 1.9–3.5)
GLUCOSE SERPL-MCNC: 87 MG/DL (ref 65–99)
HCT VFR BLD AUTO: 41.2 % (ref 37–47)
HDLC SERPL-MCNC: 51 MG/DL
HGB BLD-MCNC: 14 G/DL (ref 12–16)
IMM GRANULOCYTES # BLD AUTO: 0.02 K/UL (ref 0–0.11)
IMM GRANULOCYTES NFR BLD AUTO: 0.3 % (ref 0–0.9)
IRON SATN MFR SERPL: 55 % (ref 15–55)
IRON SERPL-MCNC: 181 UG/DL (ref 40–170)
LDLC SERPL CALC-MCNC: 81 MG/DL
LYMPHOCYTES # BLD AUTO: 1.84 K/UL (ref 1–4.8)
LYMPHOCYTES NFR BLD: 27.9 % (ref 22–41)
MCH RBC QN AUTO: 32.9 PG (ref 27–33)
MCHC RBC AUTO-ENTMCNC: 34 G/DL (ref 33.6–35)
MCV RBC AUTO: 96.9 FL (ref 81.4–97.8)
MONOCYTES # BLD AUTO: 0.56 K/UL (ref 0–0.85)
MONOCYTES NFR BLD AUTO: 8.5 % (ref 0–13.4)
NEUTROPHILS # BLD AUTO: 4.03 K/UL (ref 2–7.15)
NEUTROPHILS NFR BLD: 61.2 % (ref 44–72)
NRBC # BLD AUTO: 0 K/UL
NRBC BLD-RTO: 0 /100 WBC
PLATELET # BLD AUTO: 217 K/UL (ref 164–446)
PMV BLD AUTO: 11 FL (ref 9–12.9)
POTASSIUM SERPL-SCNC: 4.2 MMOL/L (ref 3.6–5.5)
PROT SERPL-MCNC: 6.7 G/DL (ref 6–8.2)
RBC # BLD AUTO: 4.25 M/UL (ref 4.2–5.4)
SODIUM SERPL-SCNC: 141 MMOL/L (ref 135–145)
T3FREE SERPL-MCNC: 3.37 PG/ML (ref 2.4–4.2)
T4 FREE SERPL-MCNC: 0.86 NG/DL (ref 0.53–1.43)
TIBC SERPL-MCNC: 329 UG/DL (ref 250–450)
TRANSFERRIN SERPL-MCNC: 240 MG/DL (ref 200–370)
TRIGL SERPL-MCNC: 95 MG/DL (ref 0–149)
TSH SERPL DL<=0.005 MIU/L-ACNC: 1.24 UIU/ML (ref 0.38–5.33)
WBC # BLD AUTO: 6.6 K/UL (ref 4.8–10.8)

## 2019-03-29 PROCEDURE — 86376 MICROSOMAL ANTIBODY EACH: CPT

## 2019-03-29 PROCEDURE — 83550 IRON BINDING TEST: CPT

## 2019-03-29 PROCEDURE — 85025 COMPLETE CBC W/AUTO DIFF WBC: CPT

## 2019-03-29 PROCEDURE — 84439 ASSAY OF FREE THYROXINE: CPT

## 2019-03-29 PROCEDURE — 82728 ASSAY OF FERRITIN: CPT

## 2019-03-29 PROCEDURE — 36415 COLL VENOUS BLD VENIPUNCTURE: CPT

## 2019-03-29 PROCEDURE — 84443 ASSAY THYROID STIM HORMONE: CPT

## 2019-03-29 PROCEDURE — 83540 ASSAY OF IRON: CPT

## 2019-03-29 PROCEDURE — 80053 COMPREHEN METABOLIC PANEL: CPT

## 2019-03-29 PROCEDURE — 82306 VITAMIN D 25 HYDROXY: CPT

## 2019-03-29 PROCEDURE — 84466 ASSAY OF TRANSFERRIN: CPT

## 2019-03-29 PROCEDURE — 84481 FREE ASSAY (FT-3): CPT

## 2019-03-29 PROCEDURE — 80061 LIPID PANEL: CPT

## 2019-03-29 PROCEDURE — 86800 THYROGLOBULIN ANTIBODY: CPT

## 2019-04-01 LAB
THYROGLOB AB SERPL-ACNC: <0.9 IU/ML (ref 0–4)
THYROPEROXIDASE AB SERPL-ACNC: 0.4 IU/ML (ref 0–9)

## 2019-04-06 ENCOUNTER — HOSPITAL ENCOUNTER (OUTPATIENT)
Dept: LAB | Facility: MEDICAL CENTER | Age: 39
End: 2019-04-06
Attending: FAMILY MEDICINE
Payer: COMMERCIAL

## 2019-04-06 LAB
FERRITIN SERPL-MCNC: 43.5 NG/ML (ref 10–291)
TRANSFERRIN SERPL-MCNC: 223 MG/DL (ref 200–370)

## 2019-04-06 PROCEDURE — 81256 HFE GENE: CPT

## 2019-04-06 PROCEDURE — 83550 IRON BINDING TEST: CPT

## 2019-04-06 PROCEDURE — 83540 ASSAY OF IRON: CPT

## 2019-04-06 PROCEDURE — 82728 ASSAY OF FERRITIN: CPT

## 2019-04-06 PROCEDURE — 84466 ASSAY OF TRANSFERRIN: CPT

## 2019-04-06 PROCEDURE — 36415 COLL VENOUS BLD VENIPUNCTURE: CPT

## 2019-04-07 LAB
IRON SATN MFR SERPL: 70 % (ref 15–55)
IRON SERPL-MCNC: 216 UG/DL (ref 40–170)
TIBC SERPL-MCNC: 309 UG/DL (ref 250–450)

## 2019-04-10 LAB
HFE GENE MUT ANL BLD/T: NORMAL
HFE P.C282Y BLD/T QL: NORMAL
HFE P.H63D BLD/T QL: NORMAL
HFE P.S65C BLD/T QL: NEGATIVE

## 2019-07-17 ENCOUNTER — HOSPITAL ENCOUNTER (OUTPATIENT)
Dept: LAB | Facility: MEDICAL CENTER | Age: 39
End: 2019-07-17
Attending: ORTHOPAEDIC SURGERY
Payer: COMMERCIAL

## 2019-07-17 LAB
BASOPHILS # BLD AUTO: 0.5 % (ref 0–1.8)
BASOPHILS # BLD: 0.04 K/UL (ref 0–0.12)
CRP SERPL HS-MCNC: 0.06 MG/DL (ref 0–0.75)
EOSINOPHIL # BLD AUTO: 0.13 K/UL (ref 0–0.51)
EOSINOPHIL NFR BLD: 1.6 % (ref 0–6.9)
ERYTHROCYTE [DISTWIDTH] IN BLOOD BY AUTOMATED COUNT: 42 FL (ref 35.9–50)
ERYTHROCYTE [SEDIMENTATION RATE] IN BLOOD BY WESTERGREN METHOD: 1 MM/HOUR (ref 0–20)
HCT VFR BLD AUTO: 39.7 % (ref 37–47)
HGB BLD-MCNC: 13.7 G/DL (ref 12–16)
IMM GRANULOCYTES # BLD AUTO: 0.02 K/UL (ref 0–0.11)
IMM GRANULOCYTES NFR BLD AUTO: 0.2 % (ref 0–0.9)
LYMPHOCYTES # BLD AUTO: 3 K/UL (ref 1–4.8)
LYMPHOCYTES NFR BLD: 36.4 % (ref 22–41)
MCH RBC QN AUTO: 33.3 PG (ref 27–33)
MCHC RBC AUTO-ENTMCNC: 34.5 G/DL (ref 33.6–35)
MCV RBC AUTO: 96.4 FL (ref 81.4–97.8)
MONOCYTES # BLD AUTO: 0.66 K/UL (ref 0–0.85)
MONOCYTES NFR BLD AUTO: 8 % (ref 0–13.4)
NEUTROPHILS # BLD AUTO: 4.39 K/UL (ref 2–7.15)
NEUTROPHILS NFR BLD: 53.3 % (ref 44–72)
NRBC # BLD AUTO: 0 K/UL
NRBC BLD-RTO: 0 /100 WBC
PLATELET # BLD AUTO: 255 K/UL (ref 164–446)
PMV BLD AUTO: 11.1 FL (ref 9–12.9)
RBC # BLD AUTO: 4.12 M/UL (ref 4.2–5.4)
RHEUMATOID FACT SER IA-ACNC: <10 IU/ML (ref 0–14)
T4 FREE SERPL-MCNC: 0.97 NG/DL (ref 0.53–1.43)
T4 SERPL-MCNC: 7.3 UG/DL (ref 4–12)
TSH SERPL DL<=0.005 MIU/L-ACNC: 1.22 UIU/ML (ref 0.38–5.33)
URATE SERPL-MCNC: 4.6 MG/DL (ref 1.9–8.2)
WBC # BLD AUTO: 8.2 K/UL (ref 4.8–10.8)

## 2019-07-17 PROCEDURE — 86140 C-REACTIVE PROTEIN: CPT

## 2019-07-17 PROCEDURE — 84439 ASSAY OF FREE THYROXINE: CPT

## 2019-07-17 PROCEDURE — 84443 ASSAY THYROID STIM HORMONE: CPT

## 2019-07-17 PROCEDURE — 85025 COMPLETE CBC W/AUTO DIFF WBC: CPT

## 2019-07-17 PROCEDURE — 86431 RHEUMATOID FACTOR QUANT: CPT

## 2019-07-17 PROCEDURE — 85652 RBC SED RATE AUTOMATED: CPT

## 2019-07-17 PROCEDURE — 84550 ASSAY OF BLOOD/URIC ACID: CPT

## 2019-07-17 PROCEDURE — 86038 ANTINUCLEAR ANTIBODIES: CPT

## 2019-07-17 PROCEDURE — 86200 CCP ANTIBODY: CPT

## 2019-07-17 PROCEDURE — 36415 COLL VENOUS BLD VENIPUNCTURE: CPT

## 2019-07-17 PROCEDURE — 86812 HLA TYPING A B OR C: CPT

## 2019-07-19 LAB
CCP IGG SERPL-ACNC: 2 UNITS (ref 0–19)
HLA-B27 QL FC: NEGATIVE
NUCLEAR IGG SER QL IA: NORMAL

## 2019-08-14 ENCOUNTER — HOSPITAL ENCOUNTER (OUTPATIENT)
Dept: LAB | Facility: MEDICAL CENTER | Age: 39
End: 2019-08-14
Payer: COMMERCIAL

## 2019-08-14 LAB
BDY FAT % MEASURED: 27.2 %
BP DIAS: 71 MMHG
BP SYS: 126 MMHG
CHOLEST SERPL-MCNC: 158 MG/DL (ref 100–199)
DIABETES HTDIA: NO
EVENT NAME HTEVT: NORMAL
FASTING HTFAS: YES
GLUCOSE SERPL-MCNC: 98 MG/DL (ref 65–99)
HDLC SERPL-MCNC: 53 MG/DL
HYPERTENSION HTHYP: NO
LDLC SERPL CALC-MCNC: 89 MG/DL
SCREENING LOC CITY HTCIT: NORMAL
SCREENING LOC STATE HTSTA: NORMAL
SCREENING LOCATION HTLOC: NORMAL
SMOKING HTSMO: NO
SUBSCRIBER ID HTSID: NORMAL
TRIGL SERPL-MCNC: 80 MG/DL (ref 0–149)

## 2019-08-14 PROCEDURE — 36415 COLL VENOUS BLD VENIPUNCTURE: CPT

## 2019-08-14 PROCEDURE — 80061 LIPID PANEL: CPT

## 2019-08-14 PROCEDURE — S5190 WELLNESS ASSESSMENT BY NONPH: HCPCS

## 2019-08-14 PROCEDURE — 82947 ASSAY GLUCOSE BLOOD QUANT: CPT

## 2019-08-26 ENCOUNTER — APPOINTMENT (RX ONLY)
Dept: URBAN - METROPOLITAN AREA CLINIC 20 | Facility: CLINIC | Age: 39
Setting detail: DERMATOLOGY
End: 2019-08-26

## 2019-08-26 DIAGNOSIS — L81.4 OTHER MELANIN HYPERPIGMENTATION: ICD-10-CM

## 2019-08-26 DIAGNOSIS — D22 MELANOCYTIC NEVI: ICD-10-CM

## 2019-08-26 DIAGNOSIS — L82.1 OTHER SEBORRHEIC KERATOSIS: ICD-10-CM

## 2019-08-26 DIAGNOSIS — Z87.2 PERSONAL HISTORY OF DISEASES OF THE SKIN AND SUBCUTANEOUS TISSUE: ICD-10-CM

## 2019-08-26 DIAGNOSIS — L57.8 OTHER SKIN CHANGES DUE TO CHRONIC EXPOSURE TO NONIONIZING RADIATION: ICD-10-CM

## 2019-08-26 DIAGNOSIS — D18.0 HEMANGIOMA: ICD-10-CM

## 2019-08-26 PROBLEM — D22.61 MELANOCYTIC NEVI OF RIGHT UPPER LIMB, INCLUDING SHOULDER: Status: ACTIVE | Noted: 2019-08-26

## 2019-08-26 PROBLEM — D18.01 HEMANGIOMA OF SKIN AND SUBCUTANEOUS TISSUE: Status: ACTIVE | Noted: 2019-08-26

## 2019-08-26 PROBLEM — D22.5 MELANOCYTIC NEVI OF TRUNK: Status: ACTIVE | Noted: 2019-08-26

## 2019-08-26 PROBLEM — D22.71 MELANOCYTIC NEVI OF RIGHT LOWER LIMB, INCLUDING HIP: Status: ACTIVE | Noted: 2019-08-26

## 2019-08-26 PROBLEM — D48.5 NEOPLASM OF UNCERTAIN BEHAVIOR OF SKIN: Status: ACTIVE | Noted: 2019-08-26

## 2019-08-26 PROCEDURE — ? COUNSELING

## 2019-08-26 PROCEDURE — ? BIOPSY BY SHAVE METHOD

## 2019-08-26 PROCEDURE — 11102 TANGNTL BX SKIN SINGLE LES: CPT

## 2019-08-26 PROCEDURE — ? DIAGNOSIS COMMENT

## 2019-08-26 PROCEDURE — 99214 OFFICE O/P EST MOD 30 MIN: CPT | Mod: 25

## 2019-08-26 PROCEDURE — ? OBSERVATION AND MEASURE

## 2019-08-26 ASSESSMENT — LOCATION SIMPLE DESCRIPTION DERM
LOCATION SIMPLE: RIGHT BUTTOCK
LOCATION SIMPLE: ABDOMEN
LOCATION SIMPLE: LEFT FOREARM
LOCATION SIMPLE: CHEST
LOCATION SIMPLE: LEFT UPPER ARM
LOCATION SIMPLE: RIGHT UPPER ARM
LOCATION SIMPLE: SCALP
LOCATION SIMPLE: RIGHT UPPER BACK
LOCATION SIMPLE: LEFT THIGH
LOCATION SIMPLE: RIGHT FOREARM
LOCATION SIMPLE: RIGHT CHEEK
LOCATION SIMPLE: RIGHT THIGH
LOCATION SIMPLE: LEFT CHEEK

## 2019-08-26 ASSESSMENT — LOCATION DETAILED DESCRIPTION DERM
LOCATION DETAILED: RIGHT ANTERIOR DISTAL THIGH
LOCATION DETAILED: RIGHT PROXIMAL DORSAL FOREARM
LOCATION DETAILED: RIGHT MID-UPPER BACK
LOCATION DETAILED: RIGHT INFERIOR UPPER BACK
LOCATION DETAILED: LEFT ANTERIOR DISTAL THIGH
LOCATION DETAILED: RIGHT ANTERIOR PROXIMAL UPPER ARM
LOCATION DETAILED: RIGHT SUPERIOR MEDIAL UPPER BACK
LOCATION DETAILED: LEFT MEDIAL SUPERIOR CHEST
LOCATION DETAILED: RIGHT CENTRAL MALAR CHEEK
LOCATION DETAILED: LEFT PROXIMAL DORSAL FOREARM
LOCATION DETAILED: LEFT ANTERIOR PROXIMAL UPPER ARM
LOCATION DETAILED: RIGHT DISTAL DORSAL FOREARM
LOCATION DETAILED: RIGHT BUTTOCK
LOCATION DETAILED: PERIUMBILICAL SKIN
LOCATION DETAILED: LEFT CENTRAL POSTAURICULAR SKIN
LOCATION DETAILED: LEFT INFERIOR CENTRAL MALAR CHEEK

## 2019-08-26 ASSESSMENT — LOCATION ZONE DERM
LOCATION ZONE: FACE
LOCATION ZONE: TRUNK
LOCATION ZONE: ARM
LOCATION ZONE: SCALP
LOCATION ZONE: LEG

## 2019-08-26 NOTE — PROCEDURE: DIAGNOSIS COMMENT
Detail Level: Simple
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

## 2019-08-26 NOTE — PROCEDURE: OBSERVATION
Size Of Lesion: 2mm x 2mm
Detail Level: Detailed
Size Of Lesion: 4mm x 3mm
Size Of Lesion: 1mm x 7mm

## 2019-08-26 NOTE — PROCEDURE: BIOPSY BY SHAVE METHOD
Lab Facility: 
Anesthesia Volume In Cc: 0.2
Bill For Surgical Tray: no
Notification Instructions: Patient will be notified of biopsy results. However, patient instructed to call the office if not contacted within 2 weeks.
Was A Bandage Applied: Yes
Electrodesiccation Text: The wound bed was treated with electrodesiccation after the biopsy was performed.
Consent: Written consent was obtained and risks were reviewed including but not limited to scarring, infection, bleeding, scabbing, incomplete removal, nerve damage and allergy to anesthesia.
Biopsy Method: Personna blade
Detail Level: Detailed
Type Of Destruction Used: Curettage
Electrodesiccation And Curettage Text: The wound bed was treated with electrodesiccation and curettage after the biopsy was performed.
Dressing: Band-Aid
Hemostasis: Drysol and Electrocautery
Silver Nitrate Text: The wound bed was treated with silver nitrate after the biopsy was performed.
Size Of Lesion In Cm: 0
Depth Of Biopsy: dermis
Anesthesia Type: 1% lidocaine with 1:100,000 epinephrine and a 1:6 solution of 8.4% sodium bicarbonate
Billing Type: Third-Party Bill
Lab: [id number]
Curettage Text: The wound bed was treated with curettage after the biopsy was performed.
Post-Care Instructions: I reviewed with the patient in detail post-care instructions. Patient is to keep the biopsy site dry overnight, and then apply bacitracin twice daily until healed. Patient may apply hydrogen peroxide soaks to remove any crusting.
Biopsy Type: H and E
Wound Care: Aquaphor
Cryotherapy Text: The wound bed was treated with cryotherapy after the biopsy was performed.

## 2019-09-11 ENCOUNTER — HOSPITAL ENCOUNTER (OUTPATIENT)
Dept: LAB | Facility: MEDICAL CENTER | Age: 39
End: 2019-09-11
Attending: OBSTETRICS & GYNECOLOGY
Payer: COMMERCIAL

## 2019-09-11 LAB — B-HCG SERPL-ACNC: ABNORMAL MIU/ML (ref 0–5)

## 2019-09-11 PROCEDURE — 84702 CHORIONIC GONADOTROPIN TEST: CPT

## 2019-09-13 ENCOUNTER — HOSPITAL ENCOUNTER (OUTPATIENT)
Dept: LAB | Facility: MEDICAL CENTER | Age: 39
End: 2019-09-13
Attending: OBSTETRICS & GYNECOLOGY
Payer: COMMERCIAL

## 2019-09-13 LAB — B-HCG SERPL-ACNC: ABNORMAL MIU/ML (ref 0–5)

## 2019-09-13 PROCEDURE — 84702 CHORIONIC GONADOTROPIN TEST: CPT

## 2019-09-17 ENCOUNTER — HOSPITAL ENCOUNTER (OUTPATIENT)
Dept: LAB | Facility: MEDICAL CENTER | Age: 39
End: 2019-09-17
Attending: OBSTETRICS & GYNECOLOGY
Payer: COMMERCIAL

## 2019-09-17 PROCEDURE — 86901 BLOOD TYPING SEROLOGIC RH(D): CPT

## 2019-09-17 PROCEDURE — 86900 BLOOD TYPING SEROLOGIC ABO: CPT

## 2019-09-18 LAB
ABO GROUP BLD: NORMAL
RH BLD: NORMAL

## 2019-09-18 PROCEDURE — 86901 BLOOD TYPING SEROLOGIC RH(D): CPT

## 2019-09-18 PROCEDURE — 86900 BLOOD TYPING SEROLOGIC ABO: CPT

## 2019-09-26 ENCOUNTER — HOSPITAL ENCOUNTER (OUTPATIENT)
Dept: LAB | Facility: MEDICAL CENTER | Age: 39
End: 2019-09-26
Attending: OBSTETRICS & GYNECOLOGY
Payer: COMMERCIAL

## 2019-09-26 LAB — B-HCG SERPL-ACNC: 749 MIU/ML (ref 0–5)

## 2019-09-26 PROCEDURE — 84702 CHORIONIC GONADOTROPIN TEST: CPT

## 2019-10-01 ENCOUNTER — IMMUNIZATION (OUTPATIENT)
Dept: OCCUPATIONAL MEDICINE | Facility: CLINIC | Age: 39
End: 2019-10-01

## 2019-10-01 DIAGNOSIS — Z23 NEED FOR VACCINATION: ICD-10-CM

## 2019-10-01 PROCEDURE — 90686 IIV4 VACC NO PRSV 0.5 ML IM: CPT | Performed by: NURSE PRACTITIONER

## 2019-10-07 ENCOUNTER — HOSPITAL ENCOUNTER (OUTPATIENT)
Dept: LAB | Facility: MEDICAL CENTER | Age: 39
End: 2019-10-07
Attending: OBSTETRICS & GYNECOLOGY
Payer: COMMERCIAL

## 2019-10-07 LAB — B-HCG SERPL-ACNC: 78 MIU/ML (ref 0–5)

## 2019-10-07 PROCEDURE — 36415 COLL VENOUS BLD VENIPUNCTURE: CPT

## 2019-10-07 PROCEDURE — 84702 CHORIONIC GONADOTROPIN TEST: CPT

## 2019-10-09 ENCOUNTER — HOSPITAL ENCOUNTER (OUTPATIENT)
Dept: LAB | Facility: MEDICAL CENTER | Age: 39
End: 2019-10-09
Attending: OBSTETRICS & GYNECOLOGY
Payer: COMMERCIAL

## 2019-10-09 LAB — B-HCG SERPL-ACNC: 63.8 MIU/ML (ref 0–5)

## 2019-10-09 PROCEDURE — 36415 COLL VENOUS BLD VENIPUNCTURE: CPT

## 2019-10-09 PROCEDURE — 84702 CHORIONIC GONADOTROPIN TEST: CPT

## 2019-10-17 ENCOUNTER — HOSPITAL ENCOUNTER (OUTPATIENT)
Dept: LAB | Facility: MEDICAL CENTER | Age: 39
End: 2019-10-17
Attending: OBSTETRICS & GYNECOLOGY
Payer: COMMERCIAL

## 2019-10-17 LAB — B-HCG SERPL-ACNC: 23.9 MIU/ML (ref 0–5)

## 2019-10-17 PROCEDURE — 84702 CHORIONIC GONADOTROPIN TEST: CPT

## 2019-10-17 PROCEDURE — 36415 COLL VENOUS BLD VENIPUNCTURE: CPT

## 2019-10-31 ENCOUNTER — HOSPITAL ENCOUNTER (OUTPATIENT)
Dept: LAB | Facility: MEDICAL CENTER | Age: 39
End: 2019-10-31
Attending: OBSTETRICS & GYNECOLOGY
Payer: COMMERCIAL

## 2019-10-31 LAB — B-HCG SERPL-ACNC: 5.8 MIU/ML (ref 0–5)

## 2019-10-31 PROCEDURE — 36415 COLL VENOUS BLD VENIPUNCTURE: CPT

## 2019-10-31 PROCEDURE — 84702 CHORIONIC GONADOTROPIN TEST: CPT

## 2019-12-02 ENCOUNTER — APPOINTMENT (RX ONLY)
Dept: URBAN - METROPOLITAN AREA CLINIC 20 | Facility: CLINIC | Age: 39
Setting detail: DERMATOLOGY
End: 2019-12-02

## 2019-12-02 DIAGNOSIS — D22 MELANOCYTIC NEVI: ICD-10-CM | Status: UNCHANGED

## 2019-12-02 PROBLEM — D22.71 MELANOCYTIC NEVI OF RIGHT LOWER LIMB, INCLUDING HIP: Status: ACTIVE | Noted: 2019-12-02

## 2019-12-02 PROBLEM — D22.61 MELANOCYTIC NEVI OF RIGHT UPPER LIMB, INCLUDING SHOULDER: Status: ACTIVE | Noted: 2019-12-02

## 2019-12-02 PROCEDURE — ? OBSERVATION AND MEASURE

## 2019-12-02 PROCEDURE — 99213 OFFICE O/P EST LOW 20 MIN: CPT

## 2019-12-02 ASSESSMENT — LOCATION DETAILED DESCRIPTION DERM
LOCATION DETAILED: RIGHT DISTAL DORSAL FOREARM
LOCATION DETAILED: RIGHT ANTERIOR DISTAL THIGH

## 2019-12-02 ASSESSMENT — LOCATION SIMPLE DESCRIPTION DERM
LOCATION SIMPLE: RIGHT FOREARM
LOCATION SIMPLE: RIGHT THIGH

## 2019-12-02 ASSESSMENT — LOCATION ZONE DERM
LOCATION ZONE: ARM
LOCATION ZONE: LEG

## 2020-03-25 ENCOUNTER — EH NON-PROVIDER (OUTPATIENT)
Dept: OCCUPATIONAL MEDICINE | Facility: CLINIC | Age: 40
End: 2020-03-25

## 2020-03-25 PROCEDURE — 94375 RESPIRATORY FLOW VOLUME LOOP: CPT | Performed by: PHYSICIAN ASSISTANT

## 2020-06-29 ENCOUNTER — HOSPITAL ENCOUNTER (OUTPATIENT)
Dept: LAB | Facility: MEDICAL CENTER | Age: 40
End: 2020-06-29
Attending: OBSTETRICS & GYNECOLOGY
Payer: COMMERCIAL

## 2020-06-29 PROCEDURE — 86762 RUBELLA ANTIBODY: CPT

## 2020-06-29 PROCEDURE — 86900 BLOOD TYPING SEROLOGIC ABO: CPT

## 2020-06-29 PROCEDURE — 87389 HIV-1 AG W/HIV-1&-2 AB AG IA: CPT

## 2020-06-29 PROCEDURE — 86803 HEPATITIS C AB TEST: CPT

## 2020-06-29 PROCEDURE — 86850 RBC ANTIBODY SCREEN: CPT

## 2020-06-29 PROCEDURE — 87086 URINE CULTURE/COLONY COUNT: CPT

## 2020-06-29 PROCEDURE — 87340 HEPATITIS B SURFACE AG IA: CPT

## 2020-06-29 PROCEDURE — 86901 BLOOD TYPING SEROLOGIC RH(D): CPT

## 2020-06-29 PROCEDURE — 85025 COMPLETE CBC W/AUTO DIFF WBC: CPT

## 2020-06-29 PROCEDURE — 86780 TREPONEMA PALLIDUM: CPT

## 2020-06-30 LAB
ABO GROUP BLD: NORMAL
BASOPHILS # BLD AUTO: 0.4 % (ref 0–1.8)
BASOPHILS # BLD: 0.04 K/UL (ref 0–0.12)
BLD GP AB SCN SERPL QL: NORMAL
EOSINOPHIL # BLD AUTO: 0.06 K/UL (ref 0–0.51)
EOSINOPHIL NFR BLD: 0.6 % (ref 0–6.9)
ERYTHROCYTE [DISTWIDTH] IN BLOOD BY AUTOMATED COUNT: 47.2 FL (ref 35.9–50)
HBV SURFACE AG SER QL: NORMAL
HCT VFR BLD AUTO: 41.5 % (ref 37–47)
HCV AB SER QL: NORMAL
HGB BLD-MCNC: 13.7 G/DL (ref 12–16)
HIV 1+2 AB+HIV1 P24 AG SERPL QL IA: NORMAL
IMM GRANULOCYTES # BLD AUTO: 0.05 K/UL (ref 0–0.11)
IMM GRANULOCYTES NFR BLD AUTO: 0.5 % (ref 0–0.9)
LYMPHOCYTES # BLD AUTO: 1.68 K/UL (ref 1–4.8)
LYMPHOCYTES NFR BLD: 18.1 % (ref 22–41)
MCH RBC QN AUTO: 33.3 PG (ref 27–33)
MCHC RBC AUTO-ENTMCNC: 33 G/DL (ref 33.6–35)
MCV RBC AUTO: 101 FL (ref 81.4–97.8)
MONOCYTES # BLD AUTO: 0.66 K/UL (ref 0–0.85)
MONOCYTES NFR BLD AUTO: 7.1 % (ref 0–13.4)
NEUTROPHILS # BLD AUTO: 6.79 K/UL (ref 2–7.15)
NEUTROPHILS NFR BLD: 73.3 % (ref 44–72)
NRBC # BLD AUTO: 0 K/UL
NRBC BLD-RTO: 0 /100 WBC
PLATELET # BLD AUTO: 251 K/UL (ref 164–446)
PMV BLD AUTO: 10.4 FL (ref 9–12.9)
RBC # BLD AUTO: 4.11 M/UL (ref 4.2–5.4)
RH BLD: NORMAL
RUBV AB SER QL: 169 IU/ML
TREPONEMA PALLIDUM IGG+IGM AB [PRESENCE] IN SERUM OR PLASMA BY IMMUNOASSAY: NORMAL
WBC # BLD AUTO: 9.3 K/UL (ref 4.8–10.8)

## 2020-06-30 PROCEDURE — 86901 BLOOD TYPING SEROLOGIC RH(D): CPT

## 2020-06-30 PROCEDURE — 86900 BLOOD TYPING SEROLOGIC ABO: CPT

## 2020-06-30 PROCEDURE — 86850 RBC ANTIBODY SCREEN: CPT

## 2020-07-02 LAB
BACTERIA UR CULT: NORMAL
SIGNIFICANT IND 70042: NORMAL
SITE SITE: NORMAL
SOURCE SOURCE: NORMAL

## 2020-07-14 ENCOUNTER — HOSPITAL ENCOUNTER (OUTPATIENT)
Dept: LAB | Facility: MEDICAL CENTER | Age: 40
End: 2020-07-14
Payer: COMMERCIAL

## 2020-07-14 LAB
BDY FAT % MEASURED: 26.3 %
BP DIAS: 67 MMHG
BP SYS: 114 MMHG
CHOLEST SERPL-MCNC: 174 MG/DL (ref 100–199)
DIABETES HTDIA: NO
EVENT NAME HTEVT: NORMAL
FASTING HTFAS: YES
GLUCOSE SERPL-MCNC: 79 MG/DL (ref 65–99)
HDLC SERPL-MCNC: 64 MG/DL
HYPERTENSION HTHYP: NO
LDLC SERPL CALC-MCNC: 86 MG/DL
SCREENING LOC CITY HTCIT: NORMAL
SCREENING LOC STATE HTSTA: NORMAL
SCREENING LOCATION HTLOC: NORMAL
SMOKING HTSMO: NO
SUBSCRIBER ID HTSID: NORMAL
TRIGL SERPL-MCNC: 119 MG/DL (ref 0–149)

## 2020-07-14 PROCEDURE — 82947 ASSAY GLUCOSE BLOOD QUANT: CPT

## 2020-07-14 PROCEDURE — 80061 LIPID PANEL: CPT

## 2020-07-14 PROCEDURE — S5190 WELLNESS ASSESSMENT BY NONPH: HCPCS

## 2020-07-14 PROCEDURE — 36415 COLL VENOUS BLD VENIPUNCTURE: CPT

## 2020-08-15 ENCOUNTER — HOSPITAL ENCOUNTER (OUTPATIENT)
Dept: LAB | Facility: MEDICAL CENTER | Age: 40
End: 2020-08-15
Attending: OBSTETRICS & GYNECOLOGY
Payer: COMMERCIAL

## 2020-08-15 PROCEDURE — 81420 FETAL CHRMOML ANEUPLOIDY: CPT

## 2020-08-15 PROCEDURE — 36415 COLL VENOUS BLD VENIPUNCTURE: CPT

## 2020-08-15 PROCEDURE — 81220 CFTR GENE COM VARIANTS: CPT

## 2020-08-24 LAB
ANNOTATION COMMENT IMP: NORMAL
CF EXPANDED VARIANT PANEL INTERP Q4864: NORMAL
CFTR ALLELE 1 BLD/T QL: NEGATIVE
CFTR ALLELE 1 BLD/T QL: NEGATIVE
CFTR MUT ANL BLD/T: NORMAL
FET CHR X + Y ANEUP RISK PLAS.CFDNA QN: NORMAL
FET SEX US: NORMAL
FET TS 13 RISK PLAS.CFDNA QL: NORMAL
FET TS 18 RISK PLAS.CFDNA QL: NORMAL
FET TS 21 RISK PLAS.CFDNA QL: NORMAL
FETAL FRACTION Q0204: 15.1 %
GA (DAYS): 3 D
GA (WEEKS): 14 WEEKS
PATHOLOGY STUDY: NORMAL
SERVICE CMNT-IMP: YES
TRIPLOIDY VAN TWIN Q0202: NORMAL

## 2020-09-28 ENCOUNTER — APPOINTMENT (RX ONLY)
Dept: URBAN - METROPOLITAN AREA CLINIC 20 | Facility: CLINIC | Age: 40
Setting detail: DERMATOLOGY
End: 2020-09-28

## 2020-09-28 DIAGNOSIS — L81.4 OTHER MELANIN HYPERPIGMENTATION: ICD-10-CM

## 2020-09-28 DIAGNOSIS — D22 MELANOCYTIC NEVI: ICD-10-CM

## 2020-09-28 DIAGNOSIS — L57.8 OTHER SKIN CHANGES DUE TO CHRONIC EXPOSURE TO NONIONIZING RADIATION: ICD-10-CM

## 2020-09-28 DIAGNOSIS — Z87.2 PERSONAL HISTORY OF DISEASES OF THE SKIN AND SUBCUTANEOUS TISSUE: ICD-10-CM

## 2020-09-28 DIAGNOSIS — D18.0 HEMANGIOMA: ICD-10-CM

## 2020-09-28 DIAGNOSIS — L82.1 OTHER SEBORRHEIC KERATOSIS: ICD-10-CM

## 2020-09-28 PROBLEM — D18.01 HEMANGIOMA OF SKIN AND SUBCUTANEOUS TISSUE: Status: ACTIVE | Noted: 2020-09-28

## 2020-09-28 PROBLEM — D22.5 MELANOCYTIC NEVI OF TRUNK: Status: ACTIVE | Noted: 2020-09-28

## 2020-09-28 PROBLEM — D22.71 MELANOCYTIC NEVI OF RIGHT LOWER LIMB, INCLUDING HIP: Status: ACTIVE | Noted: 2020-09-28

## 2020-09-28 PROBLEM — D22.61 MELANOCYTIC NEVI OF RIGHT UPPER LIMB, INCLUDING SHOULDER: Status: ACTIVE | Noted: 2020-09-28

## 2020-09-28 PROCEDURE — 99214 OFFICE O/P EST MOD 30 MIN: CPT

## 2020-09-28 PROCEDURE — ? COUNSELING

## 2020-09-28 PROCEDURE — ? DIAGNOSIS COMMENT

## 2020-09-28 PROCEDURE — ? OBSERVATION AND MEASURE

## 2020-09-28 ASSESSMENT — LOCATION DETAILED DESCRIPTION DERM
LOCATION DETAILED: LEFT INFERIOR CENTRAL MALAR CHEEK
LOCATION DETAILED: LEFT SUPERIOR LATERAL MIDBACK
LOCATION DETAILED: RIGHT MID-UPPER BACK
LOCATION DETAILED: RIGHT PROXIMAL DORSAL FOREARM
LOCATION DETAILED: RIGHT CENTRAL MALAR CHEEK
LOCATION DETAILED: RIGHT ANTERIOR DISTAL THIGH
LOCATION DETAILED: RIGHT BUTTOCK
LOCATION DETAILED: RIGHT SUPERIOR MEDIAL UPPER BACK
LOCATION DETAILED: LEFT MEDIAL UPPER BACK
LOCATION DETAILED: LEFT ANTERIOR DISTAL THIGH
LOCATION DETAILED: LEFT PROXIMAL DORSAL FOREARM
LOCATION DETAILED: RIGHT DISTAL DORSAL FOREARM
LOCATION DETAILED: PERIUMBILICAL SKIN
LOCATION DETAILED: RIGHT INFERIOR UPPER BACK
LOCATION DETAILED: LEFT MEDIAL SUPERIOR CHEST
LOCATION DETAILED: LEFT ANTERIOR PROXIMAL UPPER ARM
LOCATION DETAILED: RIGHT ANTERIOR PROXIMAL UPPER ARM

## 2020-09-28 ASSESSMENT — LOCATION SIMPLE DESCRIPTION DERM
LOCATION SIMPLE: CHEST
LOCATION SIMPLE: RIGHT UPPER BACK
LOCATION SIMPLE: ABDOMEN
LOCATION SIMPLE: RIGHT BUTTOCK
LOCATION SIMPLE: RIGHT UPPER ARM
LOCATION SIMPLE: LEFT UPPER BACK
LOCATION SIMPLE: RIGHT CHEEK
LOCATION SIMPLE: RIGHT THIGH
LOCATION SIMPLE: LEFT THIGH
LOCATION SIMPLE: LEFT FOREARM
LOCATION SIMPLE: LEFT LOWER BACK
LOCATION SIMPLE: LEFT CHEEK
LOCATION SIMPLE: RIGHT FOREARM
LOCATION SIMPLE: LEFT UPPER ARM

## 2020-09-28 ASSESSMENT — LOCATION ZONE DERM
LOCATION ZONE: FACE
LOCATION ZONE: LEG
LOCATION ZONE: TRUNK
LOCATION ZONE: ARM

## 2020-09-28 NOTE — PROCEDURE: OBSERVATION
Size Of Lesion: 2mm x 2mm
Detail Level: Detailed
Size Of Lesion: 4mm x 3mm
Size Of Lesion: 3mm x 5mm

## 2020-09-28 NOTE — HPI: MOLE CHECK
What Is The Reason For Today's Visit?: Mole Check
Additional History: O/m rt distal FA 2mm x 2mm
Additional History: O/m Rt ant distal thigh 4mm x 3mm

## 2020-11-16 ENCOUNTER — HOSPITAL ENCOUNTER (OUTPATIENT)
Facility: MEDICAL CENTER | Age: 40
End: 2020-11-16
Attending: OBSTETRICS & GYNECOLOGY
Payer: COMMERCIAL

## 2020-11-16 PROCEDURE — 85025 COMPLETE CBC W/AUTO DIFF WBC: CPT

## 2020-11-16 PROCEDURE — 82950 GLUCOSE TEST: CPT

## 2020-11-16 PROCEDURE — 86780 TREPONEMA PALLIDUM: CPT

## 2020-11-17 LAB
BASOPHILS # BLD AUTO: 0.2 % (ref 0–1.8)
BASOPHILS # BLD: 0.02 K/UL (ref 0–0.12)
EOSINOPHIL # BLD AUTO: 0.06 K/UL (ref 0–0.51)
EOSINOPHIL NFR BLD: 0.6 % (ref 0–6.9)
ERYTHROCYTE [DISTWIDTH] IN BLOOD BY AUTOMATED COUNT: 55.4 FL (ref 35.9–50)
GLUCOSE 1H P 50 G GLC PO SERPL-MCNC: 128 MG/DL (ref 70–139)
HCT VFR BLD AUTO: 36.9 % (ref 37–47)
HGB BLD-MCNC: 11.9 G/DL (ref 12–16)
IMM GRANULOCYTES # BLD AUTO: 0.11 K/UL (ref 0–0.11)
IMM GRANULOCYTES NFR BLD AUTO: 1.2 % (ref 0–0.9)
LYMPHOCYTES # BLD AUTO: 1.5 K/UL (ref 1–4.8)
LYMPHOCYTES NFR BLD: 15.8 % (ref 22–41)
MCH RBC QN AUTO: 35.4 PG (ref 27–33)
MCHC RBC AUTO-ENTMCNC: 32.2 G/DL (ref 33.6–35)
MCV RBC AUTO: 109.8 FL (ref 81.4–97.8)
MONOCYTES # BLD AUTO: 0.66 K/UL (ref 0–0.85)
MONOCYTES NFR BLD AUTO: 6.9 % (ref 0–13.4)
NEUTROPHILS # BLD AUTO: 7.16 K/UL (ref 2–7.15)
NEUTROPHILS NFR BLD: 75.3 % (ref 44–72)
NRBC # BLD AUTO: 0 K/UL
NRBC BLD-RTO: 0 /100 WBC
PLATELET # BLD AUTO: 201 K/UL (ref 164–446)
PMV BLD AUTO: 10.3 FL (ref 9–12.9)
RBC # BLD AUTO: 3.36 M/UL (ref 4.2–5.4)
TREPONEMA PALLIDUM IGG+IGM AB [PRESENCE] IN SERUM OR PLASMA BY IMMUNOASSAY: NORMAL
WBC # BLD AUTO: 9.5 K/UL (ref 4.8–10.8)

## 2020-11-21 ENCOUNTER — HOSPITAL ENCOUNTER (OUTPATIENT)
Facility: MEDICAL CENTER | Age: 40
End: 2020-11-21
Attending: EMERGENCY MEDICINE
Payer: COMMERCIAL

## 2020-11-23 LAB
AMBIGUOUS DTTM AMBI4: NORMAL
COVID ORDER STATUS COVID19: NORMAL
SARS-COV-2 RNA RESP QL NAA+PROBE: NOTDETECTED
SPECIMEN SOURCE: NORMAL

## 2020-11-30 ENCOUNTER — OFFICE VISIT (OUTPATIENT)
Dept: OBGYN | Facility: CLINIC | Age: 40
End: 2020-11-30
Payer: COMMERCIAL

## 2020-11-30 DIAGNOSIS — O92.70 LACTATION PROBLEM: ICD-10-CM

## 2020-11-30 PROCEDURE — 99205 OFFICE O/P NEW HI 60 MIN: CPT | Performed by: NURSE PRACTITIONER

## 2020-12-01 NOTE — PROGRESS NOTES
Subjective:     Pauline Hoffman is a 40 y.o. female here to establish lactation care. She is here today with  (Praveen).    Concerns:   Prenatal concerns    HPI:   Mother does not have a history of GDM, hypertension prior to pregnancy, insulin resistance, multiple gestation, PCOS and thyroid disease. These are common conditions which may interfere with milk supply.    Mother does have advanced maternal age. This is a  condition that may interfere in milk supply.    Breast changes in pregnancy: Yes  History of breast surgeries: No      FEEDING HISTORY:    Past breastfeeding history:  First baby     Breast Pumping:   Type of pump:  Spectra , will order    ROS:  Constitutional: No fever, chills. Feeling well  Extremities Swelling: No extremity swelling  Gastrointestinal: Negative for nausea and vomiting  Breasts: No soreness of breasts and No soreness of nipples  Psychiatric: No mood changes  Mental Health: No mention of feeling irritable, agitated, angry, overwhelmed, apathy, exhaustion nor having sleep changes outside infant feeds/demands or appetite changes     Objective:     General: no acute distress  Neurological:  Alert and oriented x3  Breasts: Symetrical , Plugged Duct - no evidence and Mastitis  - no S/S   Breasts more dense, less tissue posteriorly  Nipples: intact nipples nish with stimulation of pump at 21% equally.  At rest there is pseudo-inversion of nipple tips  Psychiatric:  Normal mood and affect. Her behavior is normal. Judgment and thought content normal   Mental Health:  Did NOT exhibit sadness, crying, feeling overwhelmed, agitation or hypervigilance.     Assessment/Plan & Lactation Counseling:     Diagnosis/Problem  Lactation Problem: Prenatal consultation    Discussed concerns and symptoms as listed above in assessment and guidance summarized below.  Topics reviewed included:  [employer] website for reviewing latch and supply  Go to https://[employer]/ , site was created  by Dr. Leatha Avila, Pediatrician, [facility].  Benjamín can also be downloaded.  Watch four videos  Click ABOUT: HOW: One 15.25 minute video to review attachment and latch  Click ABC (science): Three videos.   A for attachment is 2.56 minutes,   B for breastmilk production is 1.45 minutes and   C for calories is 6.5minutes    hand expression   Families biggest concern that interferes with milk supply is perceived lack of supply. Beginning hand expression has been shown to reduce this incidence   You are NOT trying to get milk, rather becoming familiar and comfortable with your breasts and expressing milk.   More milk sooner at [email or web address]/, CLICK  hand expression.   May start 37 weeks or after, 3x/day, partner participation encouraged.  •  Herbs and medications for increasing supply and their potential side effects and efficacy. No evidence base exists to support their use  •  Sleep or lack of and strategies to manage restorative sleep, although short amounts, significant to the mental health of the mother.   •  Self Care. Support, rest, getting out of the house each day.  • Pumping selection  o Pumping is effective but can quickly exhaust and overwhelm a new mother  o How long will vary woman to woman, typically 8-15 minutes, or 1-2 minutes after flow slows  o Flange Fit: Freely moving nipple in the tunnel with some movement of the areola.   Hospital Routine and support for breastfeeding  Donor milk availability    •  Connect with other mothers:  o Facebook:   - [place] [facility]: https://www.ISORG.com/[place].breastfeeds/  - Well-Nourished Babies (Private group for questions and support): https://www.facebook.com/groups/988523108263285/  o Breastfeeding [place] for support not assessment: Tuesday  at 11am by Zoom,  you will be sent an invitation.   - You may instead copy and paste this link:     [email or web address]/[email or web address]    - Passcode  [id number]  - You may share this link with friends; please don't post on social media      A total of 65 minutes, this does not include infant assessment time, were spent face to face with more than 50% spent counseling and coordinating care as detailed in the above note.     PLEASE NOTE: Some of this note was created using voice recognition software. I have made every reasonable attempt to correct obvious errors, but I expect that there may be errors of grammar and possibly content that I did not discover prior finalizing this note.  DARRIN Campos.

## 2020-12-16 DIAGNOSIS — Z23 NEED FOR VACCINATION: ICD-10-CM

## 2021-02-09 ENCOUNTER — HOSPITAL ENCOUNTER (INPATIENT)
Facility: MEDICAL CENTER | Age: 41
LOS: 4 days | End: 2021-02-13
Attending: OBSTETRICS & GYNECOLOGY | Admitting: OBSTETRICS & GYNECOLOGY
Payer: COMMERCIAL

## 2021-02-09 ENCOUNTER — APPOINTMENT (OUTPATIENT)
Dept: OBGYN | Facility: MEDICAL CENTER | Age: 41
End: 2021-02-09
Attending: OBSTETRICS & GYNECOLOGY
Payer: COMMERCIAL

## 2021-02-09 DIAGNOSIS — G89.18 POSTOPERATIVE PAIN: ICD-10-CM

## 2021-02-09 LAB
BASOPHILS # BLD AUTO: 0.4 % (ref 0–1.8)
BASOPHILS # BLD: 0.03 K/UL (ref 0–0.12)
EOSINOPHIL # BLD AUTO: 0.07 K/UL (ref 0–0.51)
EOSINOPHIL NFR BLD: 0.9 % (ref 0–6.9)
ERYTHROCYTE [DISTWIDTH] IN BLOOD BY AUTOMATED COUNT: 52.7 FL (ref 35.9–50)
HCT VFR BLD AUTO: 36 % (ref 37–47)
HGB BLD-MCNC: 12.3 G/DL (ref 12–16)
HOLDING TUBE BB 8507: NORMAL
IMM GRANULOCYTES # BLD AUTO: 0.04 K/UL (ref 0–0.11)
IMM GRANULOCYTES NFR BLD AUTO: 0.5 % (ref 0–0.9)
LYMPHOCYTES # BLD AUTO: 1.22 K/UL (ref 1–4.8)
LYMPHOCYTES NFR BLD: 15.7 % (ref 22–41)
MCH RBC QN AUTO: 36 PG (ref 27–33)
MCHC RBC AUTO-ENTMCNC: 34.2 G/DL (ref 33.6–35)
MCV RBC AUTO: 105.3 FL (ref 81.4–97.8)
MONOCYTES # BLD AUTO: 0.47 K/UL (ref 0–0.85)
MONOCYTES NFR BLD AUTO: 6 % (ref 0–13.4)
NEUTROPHILS # BLD AUTO: 5.94 K/UL (ref 2–7.15)
NEUTROPHILS NFR BLD: 76.5 % (ref 44–72)
NRBC # BLD AUTO: 0 K/UL
NRBC BLD-RTO: 0 /100 WBC
PLATELET # BLD AUTO: 179 K/UL (ref 164–446)
PMV BLD AUTO: 10.6 FL (ref 9–12.9)
RBC # BLD AUTO: 3.42 M/UL (ref 4.2–5.4)
SARS-COV+SARS-COV-2 AG RESP QL IA.RAPID: NOTDETECTED
SPECIMEN SOURCE: NORMAL
WBC # BLD AUTO: 7.8 K/UL (ref 4.8–10.8)

## 2021-02-09 PROCEDURE — 87426 SARSCOV CORONAVIRUS AG IA: CPT

## 2021-02-09 PROCEDURE — 700105 HCHG RX REV CODE 258: Performed by: OBSTETRICS & GYNECOLOGY

## 2021-02-09 PROCEDURE — 36415 COLL VENOUS BLD VENIPUNCTURE: CPT

## 2021-02-09 PROCEDURE — 770002 HCHG ROOM/CARE - OB PRIVATE (112)

## 2021-02-09 PROCEDURE — U0003 INFECTIOUS AGENT DETECTION BY NUCLEIC ACID (DNA OR RNA); SEVERE ACUTE RESPIRATORY SYNDROME CORONAVIRUS 2 (SARS-COV-2) (CORONAVIRUS DISEASE [COVID-19]), AMPLIFIED PROBE TECHNIQUE, MAKING USE OF HIGH THROUGHPUT TECHNOLOGIES AS DESCRIBED BY CMS-2020-01-R: HCPCS

## 2021-02-09 PROCEDURE — 85025 COMPLETE CBC W/AUTO DIFF WBC: CPT

## 2021-02-09 PROCEDURE — U0005 INFEC AGEN DETEC AMPLI PROBE: HCPCS

## 2021-02-09 RX ORDER — MAGNESIUM CITRATE
POWDER (GRAM) MISCELLANEOUS
COMMUNITY
End: 2023-05-20

## 2021-02-09 RX ORDER — SODIUM CHLORIDE, SODIUM LACTATE, POTASSIUM CHLORIDE, CALCIUM CHLORIDE 600; 310; 30; 20 MG/100ML; MG/100ML; MG/100ML; MG/100ML
INJECTION, SOLUTION INTRAVENOUS CONTINUOUS
Status: ACTIVE | OUTPATIENT
Start: 2021-02-09 | End: 2021-02-10

## 2021-02-09 RX ORDER — DEXTROSE, SODIUM CHLORIDE, SODIUM LACTATE, POTASSIUM CHLORIDE, AND CALCIUM CHLORIDE 5; .6; .31; .03; .02 G/100ML; G/100ML; G/100ML; G/100ML; G/100ML
INJECTION, SOLUTION INTRAVENOUS CONTINUOUS
Status: DISCONTINUED | OUTPATIENT
Start: 2021-02-10 | End: 2021-02-10

## 2021-02-09 RX ORDER — CITRIC ACID/SODIUM CITRATE 334-500MG
30 SOLUTION, ORAL ORAL EVERY 6 HOURS PRN
Status: DISCONTINUED | OUTPATIENT
Start: 2021-02-09 | End: 2021-02-10 | Stop reason: HOSPADM

## 2021-02-09 RX ORDER — MISOPROSTOL 200 UG/1
800 TABLET ORAL
Status: DISCONTINUED | OUTPATIENT
Start: 2021-02-09 | End: 2021-02-10 | Stop reason: HOSPADM

## 2021-02-09 RX ORDER — SODIUM CHLORIDE, SODIUM LACTATE, POTASSIUM CHLORIDE, CALCIUM CHLORIDE 600; 310; 30; 20 MG/100ML; MG/100ML; MG/100ML; MG/100ML
INJECTION, SOLUTION INTRAVENOUS CONTINUOUS
Status: ACTIVE | OUTPATIENT
Start: 2021-02-09 | End: 2021-02-11

## 2021-02-09 RX ADMIN — SODIUM CHLORIDE, POTASSIUM CHLORIDE, SODIUM LACTATE AND CALCIUM CHLORIDE: 600; 310; 30; 20 INJECTION, SOLUTION INTRAVENOUS at 19:55

## 2021-02-09 SDOH — ECONOMIC STABILITY: FOOD INSECURITY: WITHIN THE PAST 12 MONTHS, YOU WORRIED THAT YOUR FOOD WOULD RUN OUT BEFORE YOU GOT MONEY TO BUY MORE.: NEVER TRUE

## 2021-02-09 SDOH — ECONOMIC STABILITY: FOOD INSECURITY: WITHIN THE PAST 12 MONTHS, THE FOOD YOU BOUGHT JUST DIDN'T LAST AND YOU DIDN'T HAVE MONEY TO GET MORE.: NEVER TRUE

## 2021-02-09 SDOH — ECONOMIC STABILITY: TRANSPORTATION INSECURITY
IN THE PAST 12 MONTHS, HAS THE LACK OF TRANSPORTATION KEPT YOU FROM MEDICAL APPOINTMENTS OR FROM GETTING MEDICATIONS?: NO

## 2021-02-09 SDOH — ECONOMIC STABILITY: TRANSPORTATION INSECURITY
IN THE PAST 12 MONTHS, HAS LACK OF TRANSPORTATION KEPT YOU FROM MEETINGS, WORK, OR FROM GETTING THINGS NEEDED FOR DAILY LIVING?: NO

## 2021-02-09 ASSESSMENT — PATIENT HEALTH QUESTIONNAIRE - PHQ9
SUM OF ALL RESPONSES TO PHQ9 QUESTIONS 1 AND 2: 0
2. FEELING DOWN, DEPRESSED, IRRITABLE, OR HOPELESS: NOT AT ALL
1. LITTLE INTEREST OR PLEASURE IN DOING THINGS: NOT AT ALL

## 2021-02-09 ASSESSMENT — LIFESTYLE VARIABLES
TOTAL SCORE: 0
HAVE PEOPLE ANNOYED YOU BY CRITICIZING YOUR DRINKING: NO
ALCOHOL_USE: NO
TOTAL SCORE: 0
HAVE YOU EVER FELT YOU SHOULD CUT DOWN ON YOUR DRINKING: NO
CONSUMPTION TOTAL: NEGATIVE
ON A TYPICAL DAY WHEN YOU DRINK ALCOHOL HOW MANY DRINKS DO YOU HAVE: 0
EVER HAD A DRINK FIRST THING IN THE MORNING TO STEADY YOUR NERVES TO GET RID OF A HANGOVER: NO
HOW MANY TIMES IN THE PAST YEAR HAVE YOU HAD 5 OR MORE DRINKS IN A DAY: 0
TOTAL SCORE: 0
AVERAGE NUMBER OF DAYS PER WEEK YOU HAVE A DRINK CONTAINING ALCOHOL: 0
EVER FELT BAD OR GUILTY ABOUT YOUR DRINKING: NO

## 2021-02-09 ASSESSMENT — FIBROSIS 4 INDEX: FIB4 SCORE: 0.95

## 2021-02-10 ENCOUNTER — ANESTHESIA EVENT (OUTPATIENT)
Dept: OBGYN | Facility: MEDICAL CENTER | Age: 41
End: 2021-02-10
Payer: COMMERCIAL

## 2021-02-10 ENCOUNTER — ANESTHESIA (OUTPATIENT)
Dept: OBGYN | Facility: MEDICAL CENTER | Age: 41
End: 2021-02-10
Payer: COMMERCIAL

## 2021-02-10 LAB
ERYTHROCYTE [DISTWIDTH] IN BLOOD BY AUTOMATED COUNT: 53.1 FL (ref 35.9–50)
HCT VFR BLD AUTO: 34.1 % (ref 37–47)
HGB BLD-MCNC: 11.3 G/DL (ref 12–16)
MCH RBC QN AUTO: 35.5 PG (ref 27–33)
MCHC RBC AUTO-ENTMCNC: 33.1 G/DL (ref 33.6–35)
MCV RBC AUTO: 107.2 FL (ref 81.4–97.8)
PLATELET # BLD AUTO: 153 K/UL (ref 164–446)
PMV BLD AUTO: 10.7 FL (ref 9–12.9)
RBC # BLD AUTO: 3.18 M/UL (ref 4.2–5.4)
SARS-COV-2 RNA RESP QL NAA+PROBE: NOTDETECTED
SPECIMEN SOURCE: NORMAL
WBC # BLD AUTO: 17.5 K/UL (ref 4.8–10.8)

## 2021-02-10 PROCEDURE — 85027 COMPLETE CBC AUTOMATED: CPT

## 2021-02-10 PROCEDURE — 700102 HCHG RX REV CODE 250 W/ 637 OVERRIDE(OP): Performed by: ANESTHESIOLOGY

## 2021-02-10 PROCEDURE — 700101 HCHG RX REV CODE 250: Performed by: ANESTHESIOLOGY

## 2021-02-10 PROCEDURE — 303615 HCHG EPIDURAL/SPINAL ANESTHESIA FOR LABOR

## 2021-02-10 PROCEDURE — 160009 HCHG ANES TIME/MIN: Performed by: OBSTETRICS & GYNECOLOGY

## 2021-02-10 PROCEDURE — 700105 HCHG RX REV CODE 258: Performed by: OBSTETRICS & GYNECOLOGY

## 2021-02-10 PROCEDURE — 160002 HCHG RECOVERY MINUTES (STAT): Performed by: OBSTETRICS & GYNECOLOGY

## 2021-02-10 PROCEDURE — A9270 NON-COVERED ITEM OR SERVICE: HCPCS | Performed by: OBSTETRICS & GYNECOLOGY

## 2021-02-10 PROCEDURE — 700111 HCHG RX REV CODE 636 W/ 250 OVERRIDE (IP): Performed by: ANESTHESIOLOGY

## 2021-02-10 PROCEDURE — 160048 HCHG OR STATISTICAL LEVEL 1-5: Performed by: OBSTETRICS & GYNECOLOGY

## 2021-02-10 PROCEDURE — A9270 NON-COVERED ITEM OR SERVICE: HCPCS | Performed by: ANESTHESIOLOGY

## 2021-02-10 PROCEDURE — 700105 HCHG RX REV CODE 258: Performed by: ANESTHESIOLOGY

## 2021-02-10 PROCEDURE — 700111 HCHG RX REV CODE 636 W/ 250 OVERRIDE (IP)

## 2021-02-10 PROCEDURE — 36415 COLL VENOUS BLD VENIPUNCTURE: CPT

## 2021-02-10 PROCEDURE — 160035 HCHG PACU - 1ST 60 MINS PHASE I: Performed by: OBSTETRICS & GYNECOLOGY

## 2021-02-10 PROCEDURE — 302128 INFUSION PUMP: Performed by: OBSTETRICS & GYNECOLOGY

## 2021-02-10 PROCEDURE — 160029 HCHG SURGERY MINUTES - 1ST 30 MINS LEVEL 4: Performed by: OBSTETRICS & GYNECOLOGY

## 2021-02-10 PROCEDURE — 160041 HCHG SURGERY MINUTES - EA ADDL 1 MIN LEVEL 4: Performed by: OBSTETRICS & GYNECOLOGY

## 2021-02-10 PROCEDURE — 770002 HCHG ROOM/CARE - OB PRIVATE (112)

## 2021-02-10 PROCEDURE — 700102 HCHG RX REV CODE 250 W/ 637 OVERRIDE(OP): Performed by: OBSTETRICS & GYNECOLOGY

## 2021-02-10 PROCEDURE — 700111 HCHG RX REV CODE 636 W/ 250 OVERRIDE (IP): Performed by: OBSTETRICS & GYNECOLOGY

## 2021-02-10 RX ORDER — ONDANSETRON 2 MG/ML
4 INJECTION INTRAMUSCULAR; INTRAVENOUS EVERY 6 HOURS PRN
Status: DISCONTINUED | OUTPATIENT
Start: 2021-02-10 | End: 2021-02-11

## 2021-02-10 RX ORDER — DIPHENHYDRAMINE HYDROCHLORIDE 50 MG/ML
12.5 INJECTION INTRAMUSCULAR; INTRAVENOUS EVERY 6 HOURS PRN
Status: DISCONTINUED | OUTPATIENT
Start: 2021-02-10 | End: 2021-02-11

## 2021-02-10 RX ORDER — SODIUM CHLORIDE, SODIUM LACTATE, POTASSIUM CHLORIDE, AND CALCIUM CHLORIDE .6; .31; .03; .02 G/100ML; G/100ML; G/100ML; G/100ML
250 INJECTION, SOLUTION INTRAVENOUS PRN
Status: DISCONTINUED | OUTPATIENT
Start: 2021-02-10 | End: 2021-02-10 | Stop reason: HOSPADM

## 2021-02-10 RX ORDER — SODIUM CHLORIDE, SODIUM GLUCONATE, SODIUM ACETATE, POTASSIUM CHLORIDE AND MAGNESIUM CHLORIDE 526; 502; 368; 37; 30 MG/100ML; MG/100ML; MG/100ML; MG/100ML; MG/100ML
INJECTION, SOLUTION INTRAVENOUS
Status: DISCONTINUED | OUTPATIENT
Start: 2021-02-10 | End: 2021-02-10 | Stop reason: SURG

## 2021-02-10 RX ORDER — ACETAMINOPHEN 500 MG
1000 TABLET ORAL EVERY 6 HOURS
Status: COMPLETED | OUTPATIENT
Start: 2021-02-10 | End: 2021-02-11

## 2021-02-10 RX ORDER — SODIUM CHLORIDE, SODIUM LACTATE, POTASSIUM CHLORIDE, AND CALCIUM CHLORIDE .6; .31; .03; .02 G/100ML; G/100ML; G/100ML; G/100ML
1000 INJECTION, SOLUTION INTRAVENOUS
Status: DISCONTINUED | OUTPATIENT
Start: 2021-02-10 | End: 2021-02-10 | Stop reason: HOSPADM

## 2021-02-10 RX ORDER — ROPIVACAINE HYDROCHLORIDE 2 MG/ML
INJECTION, SOLUTION EPIDURAL; INFILTRATION; PERINEURAL
Status: COMPLETED
Start: 2021-02-10 | End: 2021-02-10

## 2021-02-10 RX ORDER — SODIUM CHLORIDE, SODIUM LACTATE, POTASSIUM CHLORIDE, CALCIUM CHLORIDE 600; 310; 30; 20 MG/100ML; MG/100ML; MG/100ML; MG/100ML
INJECTION, SOLUTION INTRAVENOUS CONTINUOUS
Status: DISCONTINUED | OUTPATIENT
Start: 2021-02-10 | End: 2021-02-10

## 2021-02-10 RX ORDER — IBUPROFEN 600 MG/1
600 TABLET ORAL EVERY 6 HOURS PRN
Status: DISCONTINUED | OUTPATIENT
Start: 2021-02-10 | End: 2021-02-10

## 2021-02-10 RX ORDER — BUPIVACAINE HYDROCHLORIDE 2.5 MG/ML
INJECTION, SOLUTION EPIDURAL; INFILTRATION; INTRACAUDAL
Status: COMPLETED
Start: 2021-02-10 | End: 2021-02-10

## 2021-02-10 RX ORDER — KETOROLAC TROMETHAMINE 30 MG/ML
INJECTION, SOLUTION INTRAMUSCULAR; INTRAVENOUS PRN
Status: DISCONTINUED | OUTPATIENT
Start: 2021-02-10 | End: 2021-02-10 | Stop reason: SURG

## 2021-02-10 RX ORDER — SODIUM CHLORIDE, SODIUM GLUCONATE, SODIUM ACETATE, POTASSIUM CHLORIDE AND MAGNESIUM CHLORIDE 526; 502; 368; 37; 30 MG/100ML; MG/100ML; MG/100ML; MG/100ML; MG/100ML
1500 INJECTION, SOLUTION INTRAVENOUS ONCE
Status: COMPLETED | OUTPATIENT
Start: 2021-02-10 | End: 2021-02-10

## 2021-02-10 RX ORDER — METOCLOPRAMIDE HYDROCHLORIDE 5 MG/ML
INJECTION INTRAMUSCULAR; INTRAVENOUS
Status: COMPLETED
Start: 2021-02-10 | End: 2021-02-10

## 2021-02-10 RX ORDER — ROPIVACAINE HYDROCHLORIDE 2 MG/ML
INJECTION, SOLUTION EPIDURAL; INFILTRATION
Status: DISCONTINUED | OUTPATIENT
Start: 2021-02-10 | End: 2021-02-10 | Stop reason: SURG

## 2021-02-10 RX ORDER — ONDANSETRON 2 MG/ML
4 INJECTION INTRAMUSCULAR; INTRAVENOUS
Status: DISCONTINUED | OUTPATIENT
Start: 2021-02-10 | End: 2021-02-10 | Stop reason: HOSPADM

## 2021-02-10 RX ORDER — ONDANSETRON 2 MG/ML
INJECTION INTRAMUSCULAR; INTRAVENOUS PRN
Status: DISCONTINUED | OUTPATIENT
Start: 2021-02-10 | End: 2021-02-10 | Stop reason: SURG

## 2021-02-10 RX ORDER — DOCUSATE SODIUM 100 MG/1
100 CAPSULE, LIQUID FILLED ORAL 2 TIMES DAILY PRN
Status: DISCONTINUED | OUTPATIENT
Start: 2021-02-10 | End: 2021-02-13 | Stop reason: HOSPADM

## 2021-02-10 RX ORDER — HYDROMORPHONE HYDROCHLORIDE 1 MG/ML
0.1 INJECTION, SOLUTION INTRAMUSCULAR; INTRAVENOUS; SUBCUTANEOUS
Status: DISCONTINUED | OUTPATIENT
Start: 2021-02-10 | End: 2021-02-10 | Stop reason: HOSPADM

## 2021-02-10 RX ORDER — HYDROCODONE BITARTRATE AND ACETAMINOPHEN 10; 325 MG/1; MG/1
1 TABLET ORAL EVERY 4 HOURS PRN
Status: DISCONTINUED | OUTPATIENT
Start: 2021-02-10 | End: 2021-02-10

## 2021-02-10 RX ORDER — ONDANSETRON 2 MG/ML
4 INJECTION INTRAMUSCULAR; INTRAVENOUS EVERY 6 HOURS PRN
Status: DISCONTINUED | OUTPATIENT
Start: 2021-02-10 | End: 2021-02-10

## 2021-02-10 RX ORDER — HYDROMORPHONE HYDROCHLORIDE 1 MG/ML
0.2 INJECTION, SOLUTION INTRAMUSCULAR; INTRAVENOUS; SUBCUTANEOUS
Status: DISCONTINUED | OUTPATIENT
Start: 2021-02-10 | End: 2021-02-11

## 2021-02-10 RX ORDER — SODIUM CHLORIDE, SODIUM LACTATE, POTASSIUM CHLORIDE, CALCIUM CHLORIDE 600; 310; 30; 20 MG/100ML; MG/100ML; MG/100ML; MG/100ML
INJECTION, SOLUTION INTRAVENOUS PRN
Status: DISCONTINUED | OUTPATIENT
Start: 2021-02-10 | End: 2021-02-13 | Stop reason: HOSPADM

## 2021-02-10 RX ORDER — HALOPERIDOL 5 MG/ML
1 INJECTION INTRAMUSCULAR
Status: DISCONTINUED | OUTPATIENT
Start: 2021-02-10 | End: 2021-02-10 | Stop reason: HOSPADM

## 2021-02-10 RX ORDER — OXYCODONE HCL 5 MG/5 ML
10 SOLUTION, ORAL ORAL
Status: DISCONTINUED | OUTPATIENT
Start: 2021-02-10 | End: 2021-02-10 | Stop reason: HOSPADM

## 2021-02-10 RX ORDER — METOCLOPRAMIDE HYDROCHLORIDE 5 MG/ML
10 INJECTION INTRAMUSCULAR; INTRAVENOUS ONCE
Status: COMPLETED | OUTPATIENT
Start: 2021-02-10 | End: 2021-02-10

## 2021-02-10 RX ORDER — AZITHROMYCIN 500 MG/5ML
500 INJECTION, POWDER, LYOPHILIZED, FOR SOLUTION INTRAVENOUS EVERY 24 HOURS
Status: DISCONTINUED | OUTPATIENT
Start: 2021-02-10 | End: 2021-02-10

## 2021-02-10 RX ORDER — MORPHINE SULFATE 0.5 MG/ML
INJECTION, SOLUTION EPIDURAL; INTRATHECAL; INTRAVENOUS PRN
Status: DISCONTINUED | OUTPATIENT
Start: 2021-02-10 | End: 2021-02-10 | Stop reason: SURG

## 2021-02-10 RX ORDER — CITRIC ACID/SODIUM CITRATE 334-500MG
30 SOLUTION, ORAL ORAL ONCE
Status: COMPLETED | OUTPATIENT
Start: 2021-02-10 | End: 2021-02-10

## 2021-02-10 RX ORDER — ALUMINA, MAGNESIA, AND SIMETHICONE 2400; 2400; 240 MG/30ML; MG/30ML; MG/30ML
30 SUSPENSION ORAL ONCE
Status: COMPLETED | OUTPATIENT
Start: 2021-02-10 | End: 2021-02-10

## 2021-02-10 RX ORDER — OXYTOCIN 10 [USP'U]/ML
INJECTION, SOLUTION INTRAMUSCULAR; INTRAVENOUS PRN
Status: DISCONTINUED | OUTPATIENT
Start: 2021-02-10 | End: 2021-02-10 | Stop reason: SURG

## 2021-02-10 RX ORDER — VITAMIN A ACETATE, BETA CAROTENE, ASCORBIC ACID, CHOLECALCIFEROL, .ALPHA.-TOCOPHEROL ACETATE, DL-, THIAMINE MONONITRATE, RIBOFLAVIN, NIACINAMIDE, PYRIDOXINE HYDROCHLORIDE, FOLIC ACID, CYANOCOBALAMIN, CALCIUM CARBONATE, FERROUS FUMARATE, ZINC OXIDE, CUPRIC OXIDE 3080; 12; 120; 400; 1; 1.84; 3; 20; 22; 920; 25; 200; 27; 10; 2 [IU]/1; UG/1; MG/1; [IU]/1; MG/1; MG/1; MG/1; MG/1; MG/1; [IU]/1; MG/1; MG/1; MG/1; MG/1; MG/1
1 TABLET, FILM COATED ORAL
Status: DISCONTINUED | OUTPATIENT
Start: 2021-02-11 | End: 2021-02-13 | Stop reason: HOSPADM

## 2021-02-10 RX ORDER — SODIUM CHLORIDE, SODIUM LACTATE, POTASSIUM CHLORIDE, AND CALCIUM CHLORIDE .6; .31; .03; .02 G/100ML; G/100ML; G/100ML; G/100ML
500 INJECTION, SOLUTION INTRAVENOUS ONCE
Status: COMPLETED | OUTPATIENT
Start: 2021-02-10 | End: 2021-02-10

## 2021-02-10 RX ORDER — HYDROMORPHONE HYDROCHLORIDE 1 MG/ML
0.4 INJECTION, SOLUTION INTRAMUSCULAR; INTRAVENOUS; SUBCUTANEOUS
Status: DISCONTINUED | OUTPATIENT
Start: 2021-02-10 | End: 2021-02-10 | Stop reason: HOSPADM

## 2021-02-10 RX ORDER — MEPERIDINE HYDROCHLORIDE 25 MG/ML
12.5 INJECTION INTRAMUSCULAR; INTRAVENOUS; SUBCUTANEOUS
Status: DISCONTINUED | OUTPATIENT
Start: 2021-02-10 | End: 2021-02-10 | Stop reason: HOSPADM

## 2021-02-10 RX ORDER — OXYCODONE HYDROCHLORIDE 5 MG/1
5 TABLET ORAL EVERY 4 HOURS PRN
Status: DISCONTINUED | OUTPATIENT
Start: 2021-02-10 | End: 2021-02-11

## 2021-02-10 RX ORDER — DEXAMETHASONE SODIUM PHOSPHATE 4 MG/ML
INJECTION, SOLUTION INTRA-ARTICULAR; INTRALESIONAL; INTRAMUSCULAR; INTRAVENOUS; SOFT TISSUE PRN
Status: DISCONTINUED | OUTPATIENT
Start: 2021-02-10 | End: 2021-02-10 | Stop reason: SURG

## 2021-02-10 RX ORDER — HYDROCODONE BITARTRATE AND ACETAMINOPHEN 5; 325 MG/1; MG/1
1 TABLET ORAL EVERY 4 HOURS PRN
Status: DISCONTINUED | OUTPATIENT
Start: 2021-02-10 | End: 2021-02-10

## 2021-02-10 RX ORDER — MISOPROSTOL 200 UG/1
600 TABLET ORAL
Status: DISCONTINUED | OUTPATIENT
Start: 2021-02-10 | End: 2021-02-13 | Stop reason: HOSPADM

## 2021-02-10 RX ORDER — HYDROMORPHONE HYDROCHLORIDE 1 MG/ML
0.4 INJECTION, SOLUTION INTRAMUSCULAR; INTRAVENOUS; SUBCUTANEOUS
Status: DISCONTINUED | OUTPATIENT
Start: 2021-02-10 | End: 2021-02-11

## 2021-02-10 RX ORDER — KETOROLAC TROMETHAMINE 30 MG/ML
30 INJECTION, SOLUTION INTRAMUSCULAR; INTRAVENOUS EVERY 6 HOURS
Status: DISPENSED | OUTPATIENT
Start: 2021-02-10 | End: 2021-02-11

## 2021-02-10 RX ORDER — LIDOCAINE HCL/EPINEPHRINE/PF 2%-1:200K
VIAL (ML) INJECTION PRN
Status: DISCONTINUED | OUTPATIENT
Start: 2021-02-10 | End: 2021-02-10 | Stop reason: SURG

## 2021-02-10 RX ORDER — SIMETHICONE 80 MG
80 TABLET,CHEWABLE ORAL 4 TIMES DAILY PRN
Status: DISCONTINUED | OUTPATIENT
Start: 2021-02-10 | End: 2021-02-13 | Stop reason: HOSPADM

## 2021-02-10 RX ORDER — CEFAZOLIN SODIUM 1 G/3ML
INJECTION, POWDER, FOR SOLUTION INTRAMUSCULAR; INTRAVENOUS PRN
Status: DISCONTINUED | OUTPATIENT
Start: 2021-02-10 | End: 2021-02-10 | Stop reason: SURG

## 2021-02-10 RX ORDER — OXYCODONE HYDROCHLORIDE 5 MG/1
10 TABLET ORAL EVERY 4 HOURS PRN
Status: DISCONTINUED | OUTPATIENT
Start: 2021-02-10 | End: 2021-02-11

## 2021-02-10 RX ORDER — SCOLOPAMINE TRANSDERMAL SYSTEM 1 MG/1
PATCH, EXTENDED RELEASE TRANSDERMAL PRN
Status: DISCONTINUED | OUTPATIENT
Start: 2021-02-10 | End: 2021-02-10 | Stop reason: SURG

## 2021-02-10 RX ORDER — ROPIVACAINE HYDROCHLORIDE 2 MG/ML
INJECTION, SOLUTION EPIDURAL; INFILTRATION; PERINEURAL CONTINUOUS
Status: DISCONTINUED | OUTPATIENT
Start: 2021-02-10 | End: 2021-02-10

## 2021-02-10 RX ORDER — OXYCODONE HCL 5 MG/5 ML
5 SOLUTION, ORAL ORAL
Status: DISCONTINUED | OUTPATIENT
Start: 2021-02-10 | End: 2021-02-10 | Stop reason: HOSPADM

## 2021-02-10 RX ORDER — ACETAMINOPHEN 325 MG/1
325 TABLET ORAL EVERY 4 HOURS PRN
Status: DISCONTINUED | OUTPATIENT
Start: 2021-02-10 | End: 2021-02-10

## 2021-02-10 RX ORDER — BUPIVACAINE HYDROCHLORIDE 2.5 MG/ML
INJECTION, SOLUTION EPIDURAL; INFILTRATION; INTRACAUDAL PRN
Status: DISCONTINUED | OUTPATIENT
Start: 2021-02-10 | End: 2021-02-10 | Stop reason: SURG

## 2021-02-10 RX ORDER — ONDANSETRON 4 MG/1
4 TABLET, ORALLY DISINTEGRATING ORAL EVERY 6 HOURS PRN
Status: DISCONTINUED | OUTPATIENT
Start: 2021-02-10 | End: 2021-02-10

## 2021-02-10 RX ORDER — HYDROMORPHONE HYDROCHLORIDE 1 MG/ML
0.2 INJECTION, SOLUTION INTRAMUSCULAR; INTRAVENOUS; SUBCUTANEOUS
Status: DISCONTINUED | OUTPATIENT
Start: 2021-02-10 | End: 2021-02-10 | Stop reason: HOSPADM

## 2021-02-10 RX ADMIN — SODIUM CHLORIDE, SODIUM GLUCONATE, SODIUM ACETATE, POTASSIUM CHLORIDE AND MAGNESIUM CHLORIDE 1500 ML: 526; 502; 368; 37; 30 INJECTION, SOLUTION INTRAVENOUS at 07:31

## 2021-02-10 RX ADMIN — CEFAZOLIN 2 G: 330 INJECTION, POWDER, FOR SOLUTION INTRAMUSCULAR; INTRAVENOUS at 07:49

## 2021-02-10 RX ADMIN — ACETAMINOPHEN 1000 MG: 500 TABLET ORAL at 12:10

## 2021-02-10 RX ADMIN — METOCLOPRAMIDE HYDROCHLORIDE 10 MG: 5 INJECTION INTRAMUSCULAR; INTRAVENOUS at 07:40

## 2021-02-10 RX ADMIN — OXYTOCIN 125 ML/HR: 10 INJECTION, SOLUTION INTRAMUSCULAR; INTRAVENOUS at 09:00

## 2021-02-10 RX ADMIN — ALUMINUM HYDROXIDE, MAGNESIUM HYDROXIDE, AND DIMETHICONE 30 ML: 400; 400; 40 SUSPENSION ORAL at 03:15

## 2021-02-10 RX ADMIN — KETOROLAC TROMETHAMINE 30 MG: 30 INJECTION, SOLUTION INTRAMUSCULAR at 08:08

## 2021-02-10 RX ADMIN — SODIUM CHLORIDE, POTASSIUM CHLORIDE, SODIUM LACTATE AND CALCIUM CHLORIDE 500 ML: 600; 310; 30; 20 INJECTION, SOLUTION INTRAVENOUS at 16:39

## 2021-02-10 RX ADMIN — SODIUM CITRATE AND CITRIC ACID MONOHYDRATE 30 ML: 500; 334 SOLUTION ORAL at 07:40

## 2021-02-10 RX ADMIN — BUPIVACAINE HYDROCHLORIDE 10 ML: 2.5 INJECTION, SOLUTION EPIDURAL; INFILTRATION; INTRACAUDAL; PERINEURAL at 05:24

## 2021-02-10 RX ADMIN — LIDOCAINE HYDROCHLORIDE AND EPINEPHRINE 5 ML: 20; 5 INJECTION, SOLUTION EPIDURAL; INFILTRATION; INTRACAUDAL; PERINEURAL at 07:35

## 2021-02-10 RX ADMIN — MORPHINE SULFATE 2.5 MG: 0.5 INJECTION, SOLUTION EPIDURAL; INTRATHECAL; INTRAVENOUS at 08:11

## 2021-02-10 RX ADMIN — KETOROLAC TROMETHAMINE 30 MG: 30 INJECTION, SOLUTION INTRAMUSCULAR at 21:03

## 2021-02-10 RX ADMIN — DIPHENHYDRAMINE HYDROCHLORIDE 12.5 MG: 50 INJECTION INTRAMUSCULAR; INTRAVENOUS at 15:17

## 2021-02-10 RX ADMIN — OXYTOCIN 1 MILLI-UNITS/MIN: 10 INJECTION, SOLUTION INTRAMUSCULAR; INTRAVENOUS at 02:50

## 2021-02-10 RX ADMIN — EPHEDRINE SULFATE 5 MG: 50 INJECTION INTRAVENOUS at 05:38

## 2021-02-10 RX ADMIN — ACETAMINOPHEN 1000 MG: 500 TABLET ORAL at 18:28

## 2021-02-10 RX ADMIN — ROPIVACAINE HYDROCHLORIDE 10 ML/HR: 2 INJECTION, SOLUTION EPIDURAL; INFILTRATION at 05:24

## 2021-02-10 RX ADMIN — OXYTOCIN 2000 ML/HR: 10 INJECTION, SOLUTION INTRAMUSCULAR; INTRAVENOUS at 08:03

## 2021-02-10 RX ADMIN — SODIUM CHLORIDE, SODIUM LACTATE, POTASSIUM CHLORIDE, CALCIUM CHLORIDE AND DEXTROSE MONOHYDRATE: 5; 600; 310; 30; 20 INJECTION, SOLUTION INTRAVENOUS at 06:15

## 2021-02-10 RX ADMIN — SODIUM CHLORIDE, SODIUM GLUCONATE, SODIUM ACETATE, POTASSIUM CHLORIDE AND MAGNESIUM CHLORIDE: 526; 502; 368; 37; 30 INJECTION, SOLUTION INTRAVENOUS at 05:02

## 2021-02-10 RX ADMIN — OXYTOCIN 20 UNITS: 10 INJECTION, SOLUTION INTRAMUSCULAR; INTRAVENOUS at 08:03

## 2021-02-10 RX ADMIN — KETOROLAC TROMETHAMINE 30 MG: 30 INJECTION, SOLUTION INTRAMUSCULAR at 14:30

## 2021-02-10 RX ADMIN — ROPIVACAINE HYDROCHLORIDE: 2 INJECTION, SOLUTION EPIDURAL; INFILTRATION at 05:20

## 2021-02-10 RX ADMIN — LIDOCAINE HYDROCHLORIDE AND EPINEPHRINE 5 ML: 20; 5 INJECTION, SOLUTION EPIDURAL; INFILTRATION; INTRACAUDAL; PERINEURAL at 07:37

## 2021-02-10 RX ADMIN — LIDOCAINE HYDROCHLORIDE AND EPINEPHRINE 5 ML: 20; 5 INJECTION, SOLUTION EPIDURAL; INFILTRATION; INTRACAUDAL; PERINEURAL at 07:40

## 2021-02-10 RX ADMIN — ACETAMINOPHEN 1000 MG: 500 TABLET ORAL at 23:55

## 2021-02-10 RX ADMIN — SODIUM CHLORIDE, POTASSIUM CHLORIDE, SODIUM LACTATE AND CALCIUM CHLORIDE: 600; 310; 30; 20 INJECTION, SOLUTION INTRAVENOUS at 04:22

## 2021-02-10 RX ADMIN — FENTANYL CITRATE 100 MCG: 50 INJECTION, SOLUTION INTRAMUSCULAR; INTRAVENOUS at 05:24

## 2021-02-10 RX ADMIN — DEXAMETHASONE SODIUM PHOSPHATE 8 MG: 4 INJECTION, SOLUTION INTRA-ARTICULAR; INTRALESIONAL; INTRAMUSCULAR; INTRAVENOUS; SOFT TISSUE at 08:08

## 2021-02-10 RX ADMIN — ONDANSETRON 4 MG: 2 INJECTION INTRAMUSCULAR; INTRAVENOUS at 08:08

## 2021-02-10 RX ADMIN — AZITHROMYCIN FOR INJECTION INJECTION, POWDER, LYOPHILIZED, FOR SOLUTION 500 MG: 500 INJECTION INTRAVENOUS at 07:50

## 2021-02-10 RX ADMIN — EPHEDRINE SULFATE 5 MG: 50 INJECTION INTRAVENOUS at 06:04

## 2021-02-10 RX ADMIN — EPHEDRINE SULFATE 10 MG: 50 INJECTION INTRAVENOUS at 07:00

## 2021-02-10 RX ADMIN — SODIUM CHLORIDE, POTASSIUM CHLORIDE, SODIUM LACTATE AND CALCIUM CHLORIDE: 600; 310; 30; 20 INJECTION, SOLUTION INTRAVENOUS at 01:00

## 2021-02-10 RX ADMIN — METOCLOPRAMIDE 10 MG: 5 INJECTION, SOLUTION INTRAMUSCULAR; INTRAVENOUS at 07:40

## 2021-02-10 RX ADMIN — SCOPALAMINE 1 PATCH: 1 PATCH, EXTENDED RELEASE TRANSDERMAL at 08:09

## 2021-02-10 RX ADMIN — FAMOTIDINE 20 MG: 10 INJECTION INTRAVENOUS at 07:40

## 2021-02-10 ASSESSMENT — PAIN SCALES - GENERAL: PAIN_LEVEL: 0

## 2021-02-10 ASSESSMENT — PAIN DESCRIPTION - PAIN TYPE: TYPE: SURGICAL PAIN

## 2021-02-10 NOTE — ANESTHESIA PROCEDURE NOTES
Epidural Block    Date/Time: 2/10/2021 5:04 AM  Performed by: Job Price M.D.  Authorized by: Job Price M.D.     Patient Location:  OB  Start Time:  2/10/2021 5:04 AM  End Time:  2/10/2021 5:13 AM  Reason for Block: labor analgesia    patient identified, IV checked, site marked, risks and benefits discussed, surgical consent, monitors and equipment checked, pre-op evaluation and timeout performed    Patient Position:  Sitting  Prep: ChloraPrep, patient draped and sterile technique    Monitoring:  Blood pressure, continuous pulse oximetry and heart rate  Approach:  Midline  Location:  L3-L4  Injection Technique:  ANDRA air  Skin infiltration:  Lidocaine  Strength:  1%  Dose:  3ml  Needle Type:  Tuohy  Needle Gauge:  17 G  Needle Length:  3.5 in  Loss of resistance::  8  Catheter Size:  19 G  Catheter at Skin Depth:  20  Test Dose Result:  Negative

## 2021-02-10 NOTE — H&P
Obstetrics and Gynecology  Labor and Delivery History and Physical    Date of Admission: 2021      ID: 40 y.o.  with IUP at 39w5d     Primary OB: La Lacey M.D.    Attending OB: Zi Medina M.D.    CC: elective IOL    HPI: Pauline Sharp is a 40 y.o.  at 39w5d by LMP c/w 7 week ultrasound, who presents for elective induction of labor.  Patient is feeling well.  She endorses good fetal movement.  She denies contractions, vaginal bleeding or leaking of fluid.  She has no other specific complaints today.  Current pregnancy has been uncomplicated except for a history of AMA    ROS: 10 systems reviewed and negative except as noted above.    Obstetric History    - Current, as noted in HPI      Past Medical History  Surgical History   AMA Right thumb  LEEP -14y ago      Gynecologic History  Social History   Regular menses prior to pregnancy  + Hx of abnormal pap smears, with severe dysplasia treated with LEEP 14y ago, normal paps since.  Denies Hx of STIs, specifically denies herpes for patient or partner Tobacco: denies  EtOH: denies  Street Drugs: denies  Speech therapist at [employer]      Medications  Allergies   PNV, DHA, Magnesium  Was on ASA 81 until 37wk No Known Allergies     Family History   Hemochromatosis (Father), CHF (MGM), Lung CA (PGF)       O: /78   Pulse (!) 101   Temp 36 °C (96.8 °F) (Temporal)   Resp 18   Ht 1.524 m (5')   Wt 70.8 kg (156 lb)   SpO2 97%   BMI 30.47 kg/m²       Gen: NAD, AAO  Resp: unlabored  Abd: Gravid, NTTP,Cephalic by Le[place], No rebound or guarding  Ext: NTTP, 1+ edema, 2+DPP  Pelvic: SVE /70/-3, moderate, posterior    FHT:  120/mod kam/+accels, -decels  [place]: CTX q6-3min    Labs:   Lab Results   Component Value Date/Time    WBC 7.8 2021 05:25 PM    RBC 3.42 (L) 2021 05:25 PM    HEMOGLOBIN 12.3 2021 05:25 PM    HEMATOCRIT 36.0 (L) 2021 05:25 PM    .3 (H) 2021 05:25 PM    RDW 52.7 (H)  2021 05:25 PM    PLATELETCT 179 2021 05:25 PM       Prenatal labs:   Lab  Result    Rh  positive    Antibody screen  negative    Rubella  immune    HIV  Non-reactive    RPR  Non-reactive    HBsAg  negative    Urine Culture  wnl    Gonorrhea/chlamydia/trich  neg/neg/neg    Aneuploidy screening  NIPS negative, CF screen negative    1h Glucose  128 wnl    GBS  negative       A/P: Pauline Sharp is a  at 39w5d by LMP c/w 7wk U/S who presents for elective induction of labor.  AVSS.  Cat I FHT, currently.  *Admit to L&D  *IV, CBC, T&S on hold  *Induction of labor: unfavorable cervix, cervical ripening balloon placed.   *FWB: On presentation, there were spontaneous variable and one prolonged deceleration.  The FHT have been reassuring, reactive, Cat I FHT since then.  CEFM.  *Pain: planning epidural when needed  *Global: Rh+, RubImm, GBS neg.    - Yovani Medina MD, MS,  2021, 8:06 PM

## 2021-02-10 NOTE — PROGRESS NOTES
1000 Assumed care from labor and delivery. Oriented patient to room, call light, emergency light, TV, bed remote. Assessment completed, fundus firm, lochia light. ABD incision with dressing intact. Plan of care reviewed, verbalized understanding. Patient denies pain at this time, will call if pain med intervention needed.  1100 Patient oxygen sat decreased to 86% on room air when sleeping, nasal cannula placed with 1 L O2  1640 Patient indicated feeling exhausted due lack of sleep and has hard time following a conversation and while saying random things. Patient output at 33ml/hr. Patient denies headache, vision changes or other discomfort. MD Thompson called and notified of patients status. 500ml LR bolus ordered.   1715 Patient sleeping. No signs of distress noted.   1815 Patient ambulated to restroom with minimal assist, tolerated well. Maria Teresa care completed.

## 2021-02-10 NOTE — CARE PLAN
Problem: Altered physiologic condition related to postoperative  delivery  Goal: Patient physiologically stable as evidenced by normal lochia, palpable uterine involution and vital signs within normal limits  Outcome: PROGRESSING AS EXPECTED  Note: Fundus firm, lochia light     Problem: Alteration in comfort related to surgical incision and/or after birth pains  Goal: Patient verbalizes acceptable pain level  Outcome: PROGRESSING AS EXPECTED  Note: Denies pain at this time, will call if pain med needed.

## 2021-02-10 NOTE — OR SURGEON
Immediate Post OP Note    PreOp Diagnosis: [id number] at 39w5d, NRFHT with repetitive late decels; meconium,    PostOp Diagnosis: same plus AMA    Procedure(s):   SECTION, PRIMARY - Wound Class: Contaminated    Surgeon(s):  ULICES Estrella M.D.    Anesthesiologist/Type of Anesthesia:  Anesthesiologist: Leroy Jiménez M.D.; Job Price M.D./epidural    Surgical Staff:  Circulator: Liliana Myers R.N.  Scrub Person: Laurence GONZALSE&SUREKHA Circulator Assistant: Trupti Corley R.N.  L&SUREKHA Baby  Nurse: Bridget Vo R.N.    Specimens removed if any:  Cord gases    Estimated Blood Loss: 600ml    Findings: male , vertex, ap 8&8; normal uterus, ovaries and ft    Complications: none, dict #[id number]        2/10/2021 8:37 AM La Lacey M.D.

## 2021-02-10 NOTE — PROGRESS NOTES
0655-Report from LAVERNE Pizarro RN. POC discussed.   0705-Dr. Lacey at bedside, SVE as charted, POC discussed  0715- section called  0745-Pt transferred to OR 1  08-Delivery of viable male infant via  section, apgars 8/8  0838-Pt transferred to PACU, stable condition  0954-Pt transferred to  via [facility]. Report given to Cindy LAINEZ. POC discussed    LATE ENTRY    @ 1143- Could not complete delivery summary at the time of delivery due to an error with 2 babies being pended for this patient. Work ticket was placed on 2/10 to have pending baby B removed. Completed delivery summary at this time

## 2021-02-10 NOTE — H&P
DATE OF ADMISSION:  2021     IDENTIFICATION:  This is a 40-year-old , her EDC is , which makes   her 39 weeks and 6 days.  The patient is being taken for primary  for   nonreassuring fetal heart tracing.     HISTORY OF PRESENT ILLNESS:  The patient has been followed by myself.    Pregnancy is complicated by advanced maternal age status.  She did have a   consultation with [facility] and elected an NIPT testing.    This came back low risk and consistent with a male.  She was negative for   cystic fibrosis mutations and had a normal anatomy scan with a posterior   placenta.  She also had a normal growth scan at 32 weeks.  She has been   undergoing once a week NSTs, which have been reassuring.  Her glucose was 128.    Her GBS was negative.  She was admitted last evening for induction of labor   for advanced maternal age.  She was vania on her own.  She had a balloon   placed and her membranes ruptured at about midnight last night.  Balloon was   then taken out and she labored on her own throughout the night.  She received   an epidural early this morning.  Shortly after the epidural, she had a   prolonged deceleration that was 7-8 minutes.  This resolved with position   changes and ephedrine.  Prior to that, she had, had several random severe   variables and since that time, has had repetitive late decelerations.  She is   now 3 cm; however, given the repetitive late decelerations, I recommended we   proceed with a primary  section for nonreassuring fetal heart tracing.    I have reviewed the  and the patient and her  agree.  Her   blood type is A positive.  She is rubella immune, hepatitis B ____   nonreactive, RPR nonreactive, HIV nonreactive, hepatitis C nonreactive.  She   is GBS negative and COVID negative.     REVIEW OF SYSTEMS:  She is negative.     PAST MEDICAL HISTORY:  History of a LEEP for abnormal Paps in .     ALLERGIES:  She has no  known drug allergies.     PAST SURGICAL HISTORY:  LEEP and thumb surgery.     FAMILY HISTORY:  Heart disease, hemochromatosis congestive heart failure,   hypertension.     PHYSICAL EXAMINATION:    VITAL SIGNS:  Her blood pressures have been normotensive.  She has been   afebrile throughout the night.  GENERAL:  She is tearful, concerned, but appropriate.  ABDOMEN:  Soft, gravid.  Leopold's 7-8 pounds.  PELVIC:  Sterile vaginal exam; again she is 3 cm, 100% effaced, -2 station.    Vertex is palpated.  Fetal heart tracing again category 3.     ASSESSMENT:  A 40-year-old  at 39 weeks and 6 days today, who is here   with advanced maternal age for an induction of labor for an advanced maternal   age ____ now with nonreassuring fetal heart tracing.     PLAN:  To proceed with a primary  section.  I have reviewed the   surgery in detail with the patient and her  including risks of bleeding   leading to a blood transfusion, infection requiring IV antibiotics, damage to   internal organs including her bowel, bladder, ureters, blood vessels, nerves.    The patient voiced her understanding and she agrees to proceed.  She is   currently being prepped and taken to the operating room.        ______________________________  MD RACHEL Paulino/SUB    DD:  02/10/2021 07:48  DT:  02/10/2021 08:14    Job#:  [id number]

## 2021-02-10 NOTE — PROGRESS NOTES
1630-  at 39-5 presents for elective IOL per pt. Denies ctx, leaking, and bleeding. Reports +FM. Reports cervix was closed last exam. TOCO/EFM placed. POC discussed. Admission profile completed. Pt denies health or pregnancy problems. Very active FM noted.   1708- SVE difficult to ascertain: FT/50/posterior, cephalic.   1710- Audible prolonged decel during active FM. Pos changed and resolved.  1725- IV started. Labs drawn.   1740- Report and strip review with Dr. Medina. MD will place cervical ripening balloon shortly.   1840- Covid swabs collected.  1900- Report to LAVERNE Jarrett RN.   1920- Status update to Dr. Medina: moderate variability and late decel. MD will evaluate. Update to RN.

## 2021-02-10 NOTE — ANESTHESIA TIME REPORT
Anesthesia Start and Stop Event Times     Date Time Event    2/10/2021 0458 Ready for Procedure     0502 Anesthesia Start     0842 Anesthesia Stop        Responsible Staff  02/10/21    Name Role Begin End    Job Price M.D. Anesth 0502 0701    Leroy Jiménez M.D. Anesth 0701 0842        Preop Diagnosis (Free Text):  Pre-op Diagnosis     Fetal Intolerance to labor        Preop Diagnosis (Codes):  Diagnosis Information     Diagnosis Code(s): Status post primary low transverse  section [Z98.891]        Post op Diagnosis  Pregnancy  fetal intolerance of labor, nonreassuring heart tones    Premium Reason  Non-Premium    Comments:

## 2021-02-10 NOTE — PROGRESS NOTES
Obstetrics and Gynecology  Labor and Delivery Progress Note    Delayed entry secondary to epic downtime  ID/CC: 40 y.o. is a  at 39w6d, AMA IOL    S: Patient stated that she had significant    O: /77   Pulse 80   Temp 36 °C (96.8 °F) (Temporal)   Resp 18   Ht 1.524 m (5')   Wt 70.8 kg (156 lb)   SpO2 97%   BMI 30.47 kg/m²    Gen: NAD, AAO  FHT: 145/mod kam/+accels, +intermittent variable decelerations with infrequent but some times prolonged decelerations interspersed within Cat I FHT  Tappen: q3-4min  SVE: /-3, copious clear fluid    A/P: Pauline Sharp is a 40 y.o.  at 39w6d, AMA.  AVSS.  Cat II FHT.  *Labor: Now s/p cervical ripening balloon removed after SROM. Anticipate vaginal delivery depending on reassuring fetal heart tones.   - Recheck cx in 4h or as clinically indicated.    *FWB: Intermittent variables interspersed with reassuring, reactive, Cat I FHT.  The patient has had a couple prolonged decelerations that recovered to category 1 fetal heart tones.  At this time it appears reasonable that she can continue labor and start oxytocin   - CEFM.  *Pain: Epidural available as appropriate for stage of labor  *Rh+/RubImm/GBSneg   - Yovani Medina M.D., 2/10/2021

## 2021-02-10 NOTE — ANESTHESIA POSTPROCEDURE EVALUATION
Patient: Pauline Sharp    Procedure Summary     Date: 02/10/21 Room / Location: [facility] OR 01 / SURGERY LABOR AND DELIVERY    Anesthesia Start: 0502 Anesthesia Stop: 842    Procedure:  SECTION, PRIMARY (Abdomen) Diagnosis:       Status post primary low transverse  section      (Fetal Intolerance to labor)    Surgeons: La Lacey M.D. Responsible Provider: Leroy Jiménez M.D.    Anesthesia Type: epidural ASA Status: 2          Final Anesthesia Type: epidural  Last vitals  BP   Blood Pressure: (!) 93/54    Temp   36.3 °C (97.4 °F)    Pulse   94   Resp   18    SpO2   91 %      Anesthesia Post Evaluation    Patient location during evaluation: PACU  Patient participation: complete - patient participated  Level of consciousness: awake and alert  Pain score: 0    Airway patency: patent  Anesthetic complications: no  Cardiovascular status: hemodynamically stable  Respiratory status: acceptable  Hydration status: euvolemic    PONV: none          No complications documented.     Nurse Pain Score: 0 (NPRS)

## 2021-02-10 NOTE — CARE PLAN
Problem: Knowledge Deficit  Goal: Patient/Support person demonstrate understanding regarding  Section  Outcome: PROGRESSING AS EXPECTED  Note: POC discussed. Informed consent obtained. Provider and anesthesiologist at bedside to discuss risks/benefits of procedure     Problem: Risk for Infection, Impaired Wound Healing  Goal: Remain free from signs and symptoms of infection  Outcome: PROGRESSING AS EXPECTED  Note: Pt afebrile, no s/s of infection

## 2021-02-10 NOTE — OP REPORT
DATE OF SERVICE:  02/10/2021     PREOPERATIVE DIAGNOSES:    1.  A 40-year-old  at 39 weeks 5 days.  2.  Nonreassuring fetal heart tracing with repetitive late decelerations.  3.  Meconium stained amniotic fluid.  4.  Advanced maternal age.     POSTOPERATIVE DIAGNOSES:  1.  A 40-year-old  at 39 weeks 5 days.  2.  Nonreassuring fetal heart tracing with repetitive late decelerations.  3.  Meconium stained amniotic fluid.  4.  Advanced maternal age.     PROCEDURE PERFORMED:  Primary low transverse  section.     SURGEON:  La Lacey MD     ASSISTANT:  Zi Medina MD     ANESTHESIOLOGIST:  Leroy Jiménez MD     TYPE OF ANESTHESIA:  Epidural.     SPECIMENS:  Cord gases.     ESTIMATED BLOOD LOSS:  600 mL.     FINDINGS:  Male infant in OA presentation with thick meconium stained amniotic   fluid, with Apgars of 8 and 8, weighing 3280 grams.  Normal uterus, normal   ovaries and fallopian tubes bilaterally.     DESCRIPTION OF PROCEDURE:  After informed consent was obtained, the patient   was taken to the operating room where she was given further epidural   anesthesia.  She was sterilely prepped and draped.  A Cerda catheter was   already in her bladder.  She already had sequential compression devices in   place.  A Pfannenstiel skin incision was made and carried down both sharply   and with Bovie cautery to the fascia.  The fascia was nicked in the midline   and fascial incision was extended both bilaterally.  Fascia was then dissected   both superiorly and inferiorly from the rectus muscle bellies.  Rectus muscle   bellies were  bluntly.  Peritoneum was identified, elevated and   incised and entrance into the abdominal cavity was obtained.  The rest of   peritoneum was then taken down ensuring no injury to bowel or bladder.  Rick   O retractor was placed.  A bladder flap was created.  A low transverse   uterine incision was made.  Fetal head was elevated and delivered.  Thick    meconium fluid was noted.  Shoulders and rest of body followed without   difficulty.  Baby's mouth and nares were bulb suctioned, removing as much   meconium as I could.  Baby started crying, had vigorous tone.  Cord was doubly   clamped at approximately 30 seconds and the baby was handed over the waiting   nursing staff.  A segment of cord was cut for gases.  Placenta then delivered   via manual massage.  Uterus was wiped clean with a laparotomy sponge and the   uterine incision was closed in a running locked fashion using 0 Vicryl.  A   second imbricating layer was also placed using 0 Vicryl.  There were ____ some   oozing along the uterine incision, which required additional suturing as well   as some Bovie cautery to the serosal edges to obtain hemostasis.  Irrigation   was performed and hemostasis was noted.  Adnexa were visualized and found to   be normal.  Rick O retractor was removed.  The peritoneum was then closed in   a running fashion using 3-0 Vicryl.  Subfascial tissues were irrigated,   inspected and found to be hemostatic.  The lower abdominal muscles were   brought together in an interrupted fashion using 0 Vicryl.  ____ fascia was   then closed in a running fashion using 0 Vicryl.  Subcutaneous tissues were   irrigated and closed in a running fashion using 3-0 Vicryl.  Skin was closed   in a subcuticular fashion using 4-0 Monocryl.  There were no complications.    Sponge and needle counts were correct.  Mom and baby are doing well.        ______________________________  MD RACHEL Paulino/JOSE    DD:  02/10/2021 08:43  DT:  02/10/2021 09:20    Job#:  [id number]

## 2021-02-10 NOTE — ANESTHESIA PREPROCEDURE EVALUATION
Relevant Problems   No relevant active problems       Physical Exam    Airway   Mallampati: II  TM distance: >3 FB  Neck ROM: full       Cardiovascular - normal exam  Rhythm: regular  Rate: normal  (-) murmur     Dental - normal exam           Pulmonary - normal exam  Breath sounds clear to auscultation     Abdominal    Neurological - normal exam                 Anesthesia Plan    ASA 2       Plan - epidural   Neuraxial block will be labor analgesia                  Pertinent diagnostic labs and testing reviewed    Informed Consent:    Anesthetic plan and risks discussed with patient.

## 2021-02-10 NOTE — PROGRESS NOTES
Obstetrics and Gynecology  Labor and Delivery Progress Note    ID/CC: 40 y.o. is a  at 39w6d, AMA IOL    S: Pt now comfortable with epidural, very numb on right.    O: /77   Pulse 80   Temp 36 °C (96.8 °F) (Temporal)   Resp 18   Ht 1.524 m (5')   Wt 70.8 kg (156 lb)   SpO2 97%   BMI 30.47 kg/m²    Gen: NAD, AAO  FHT: 155/mod kam/+accels, just had prolonged decelerations immediately following epidural placement and follow up late deceleration.    Chical: q3-4min  SVE: 1.5/90/-1 per RN exam, meconium now present    A/P: Pauline Sharp is a 40 y.o.  at 39w6d, AMA.  AVSS.  Cat I FHT.  *Labor: S/P cervical ripening balloon removed after SROM.    - Recheck cx in 4h or as clinically indicated.    *FWB: Prolonged deceleration occurred immediately post epidural.  BP noted to go from 140/82 to 114/55.  Improved with repositioning to all fours and 5mg of ephedrine.  If further decelerations could have to abandon labor for  delivery.  Will continue to monitor closely    - CEFM.   - RT at deliver for meconium  *Pain: Epidural just placed  *Rh+/RubImm/GBSneg   - Yovani Medina M.D., 2/10/2021

## 2021-02-10 NOTE — PROGRESS NOTES
1900: Assumed care of patient, Report Received from OREN Alfredo RN. [place], Gestation 39.5.     Patient in bed, awake, RN at bedside, denies pain, pleasant affect. POC discussed, all questions answered. Pain management discussed; patient would like an epidural as labor progresses.     Patient would like FOB Praveen at bedside for delivery phase. Patient would like skin to skin, FOB to cut cord after pulsating, breast feeding with DBM as necessary.     EFM used, discussed and in place.     Patient and family deny questions regarding care since arriving at [facility]. Dry erase board updated with RN contact information, discussed. Patient and family encouraged to call RN with all questions, concerns and needs.     2000: Dr. Medina at BS for SVE: 1/40/-3. Cerda balloon inserted 80/80. Patient education provided. Patient denies further needs at this time.     0000: Pt calls out after using the restroom feeling like her water broke. Dr. Medina at BS; balloon deflated; copius amounts of clear fluid noted upon balloon D/C'd. Orders to start pitocin. SVE: 1/90/-3.     0012: RN at BS; Dr. Medina at BS for far right and far left shift due to infant deceleration.     0200: FHR strip reviewed with Dr. Medina; updated that RN has not started pitocin as of yet r/t FHR tracing.     0250: RN at BS. Pitocin started; patient education provided.     0430: Patient requesting epidural. Fluids bolusing; consents signed.     0530: RN at BS for prolonged deceleration. (3) RNs at BS. Dr. Medina called to BS for evaluation. Pitocin off, IV bolus started, O2 applied. Patient positioned into far right, then far left, and then hands and knees. SVE: 1.5/100/-3. Ephedrine given for low B/Ps - see MAR. C/S discussed with patient.     0645: Cerda inserted; patient tolerated well.     0700: Report given to LATRICIA Myers RN.

## 2021-02-11 PROCEDURE — 700102 HCHG RX REV CODE 250 W/ 637 OVERRIDE(OP): Performed by: ANESTHESIOLOGY

## 2021-02-11 PROCEDURE — 770002 HCHG ROOM/CARE - OB PRIVATE (112)

## 2021-02-11 PROCEDURE — A9270 NON-COVERED ITEM OR SERVICE: HCPCS | Performed by: ANESTHESIOLOGY

## 2021-02-11 PROCEDURE — 700111 HCHG RX REV CODE 636 W/ 250 OVERRIDE (IP): Performed by: ANESTHESIOLOGY

## 2021-02-11 PROCEDURE — 700102 HCHG RX REV CODE 250 W/ 637 OVERRIDE(OP): Performed by: OBSTETRICS & GYNECOLOGY

## 2021-02-11 PROCEDURE — A9270 NON-COVERED ITEM OR SERVICE: HCPCS | Performed by: OBSTETRICS & GYNECOLOGY

## 2021-02-11 RX ORDER — DIPHENHYDRAMINE HCL 25 MG
25 TABLET ORAL EVERY 6 HOURS PRN
Status: DISCONTINUED | OUTPATIENT
Start: 2021-02-11 | End: 2021-02-13 | Stop reason: HOSPADM

## 2021-02-11 RX ORDER — ACETAMINOPHEN 325 MG/1
325 TABLET ORAL EVERY 4 HOURS PRN
Status: DISCONTINUED | OUTPATIENT
Start: 2021-02-11 | End: 2021-02-13 | Stop reason: HOSPADM

## 2021-02-11 RX ORDER — ONDANSETRON 4 MG/1
4 TABLET, ORALLY DISINTEGRATING ORAL EVERY 6 HOURS PRN
Status: DISCONTINUED | OUTPATIENT
Start: 2021-02-11 | End: 2021-02-13 | Stop reason: HOSPADM

## 2021-02-11 RX ORDER — METOCLOPRAMIDE HYDROCHLORIDE 5 MG/ML
10 INJECTION INTRAMUSCULAR; INTRAVENOUS EVERY 6 HOURS PRN
Status: DISCONTINUED | OUTPATIENT
Start: 2021-02-11 | End: 2021-02-13 | Stop reason: HOSPADM

## 2021-02-11 RX ORDER — IBUPROFEN 600 MG/1
600 TABLET ORAL EVERY 6 HOURS PRN
Status: DISCONTINUED | OUTPATIENT
Start: 2021-02-11 | End: 2021-02-13 | Stop reason: HOSPADM

## 2021-02-11 RX ORDER — OXYCODONE HYDROCHLORIDE AND ACETAMINOPHEN 5; 325 MG/1; MG/1
1 TABLET ORAL EVERY 4 HOURS PRN
Status: DISCONTINUED | OUTPATIENT
Start: 2021-02-11 | End: 2021-02-13 | Stop reason: HOSPADM

## 2021-02-11 RX ORDER — KETOROLAC TROMETHAMINE 30 MG/ML
30 INJECTION, SOLUTION INTRAMUSCULAR; INTRAVENOUS EVERY 6 HOURS
Status: DISCONTINUED | OUTPATIENT
Start: 2021-02-12 | End: 2021-02-11

## 2021-02-11 RX ORDER — ONDANSETRON 2 MG/ML
4 INJECTION INTRAMUSCULAR; INTRAVENOUS EVERY 6 HOURS PRN
Status: DISCONTINUED | OUTPATIENT
Start: 2021-02-11 | End: 2021-02-13 | Stop reason: HOSPADM

## 2021-02-11 RX ORDER — OXYCODONE AND ACETAMINOPHEN 10; 325 MG/1; MG/1
1 TABLET ORAL EVERY 4 HOURS PRN
Status: DISCONTINUED | OUTPATIENT
Start: 2021-02-11 | End: 2021-02-13 | Stop reason: HOSPADM

## 2021-02-11 RX ORDER — DIPHENHYDRAMINE HYDROCHLORIDE 50 MG/ML
25 INJECTION INTRAMUSCULAR; INTRAVENOUS EVERY 6 HOURS PRN
Status: DISCONTINUED | OUTPATIENT
Start: 2021-02-11 | End: 2021-02-13 | Stop reason: HOSPADM

## 2021-02-11 RX ADMIN — KETOROLAC TROMETHAMINE 30 MG: 30 INJECTION, SOLUTION INTRAMUSCULAR at 05:58

## 2021-02-11 RX ADMIN — OXYCODONE HYDROCHLORIDE AND ACETAMINOPHEN 1 TABLET: 5; 325 TABLET ORAL at 12:58

## 2021-02-11 RX ADMIN — SIMETHICONE 80 MG: 80 TABLET, CHEWABLE ORAL at 20:38

## 2021-02-11 RX ADMIN — OXYCODONE HYDROCHLORIDE AND ACETAMINOPHEN 1 TABLET: 5; 325 TABLET ORAL at 20:17

## 2021-02-11 RX ADMIN — DOCUSATE SODIUM 100 MG: 100 CAPSULE, LIQUID FILLED ORAL at 20:16

## 2021-02-11 RX ADMIN — ACETAMINOPHEN 1000 MG: 500 TABLET ORAL at 05:57

## 2021-02-11 RX ADMIN — IBUPROFEN 600 MG: 600 TABLET, FILM COATED ORAL at 12:58

## 2021-02-11 RX ADMIN — IBUPROFEN 600 MG: 600 TABLET, FILM COATED ORAL at 20:17

## 2021-02-11 ASSESSMENT — PAIN DESCRIPTION - PAIN TYPE
TYPE: SURGICAL PAIN

## 2021-02-11 NOTE — CARE PLAN
Problem: Altered physiologic condition related to postoperative  delivery  Goal: Patient physiologically stable as evidenced by normal lochia, palpable uterine involution and vital signs within normal limits  Outcome: PROGRESSING AS EXPECTED  Note: Fundus firm. Lochia light to scant.      Problem: Potential for postpartum infection related to surgical incision, compromised uterine condition, urinary tract or respiratory compromise  Goal: Patient will be afebrile and free from signs and symptoms of infection  Outcome: PROGRESSING AS EXPECTED  Note: Patient is afebrile. Incision approximated, dressing in place. No redness or drainage noted. No signs and symptoms of infection.

## 2021-02-11 NOTE — PROGRESS NOTES
Assessment completed. WDL. Encouraged pt to increase PO intake. Patient claims to have good pain relief with scheduled pain medications. Pt states she will call when she needs prn pain medication. Pt denies any other issues at this time. Educated pt about feeding infant every 2-3 hours or when infant is showing cues. Pt encouraged to call for next feed to assess latch. Pt encouraged to call for any needs.

## 2021-02-11 NOTE — PROGRESS NOTES
0210-Called Dr. Lacey to report pt's 0200 vitals and to report pt's urine output of 20 ml/hr. MD ordered 500 ml bolus of LR. No new orders given.

## 2021-02-11 NOTE — PROGRESS NOTES
POD#1 s/p LTCS fo rNRFHT    S/ pain controlled, got some sleep and feels better, baby latching well, dec vb    O/  Vitals:    02/10/21 2300 02/11/21 0000 02/11/21 0115 02/11/21 0600   BP:   (!) 98/47 102/53   Pulse: 77 76 79 72   Resp: 18 18 18 18   Temp:   36.8 °C (98.3 °F) 36.7 °C (98 °F)   TempSrc:   Temporal Temporal   SpO2: 95% 94% 92% 94%   Weight:       Height:           Recent Labs     02/09/21  1725 02/10/21  1553   WBC 7.8 17.5*   RBC 3.42* 3.18*   HEMOGLOBIN 12.3 11.3*   HEMATOCRIT 36.0* 34.1*   .3* 107.2*   MCH 36.0* 35.5*   RDW 52.7* 53.1*   PLATELETCT 179 153*   MPV 10.6 10.7   NEUTSPOLYS 76.50*  --    LYMPHOCYTES 15.70*  --    MONOCYTES 6.00  --    EOSINOPHILS 0.90  --    BASOPHILS 0.40  --    Gen-comfortable  abd- soft, ff below umb  Inc-dressing intact  ext- calves nt,1+edema    A&P/POD#1 stable  Routine postop care

## 2021-02-11 NOTE — PROGRESS NOTES
Assessment completed WDL. Pt denies need for pain medication at this time. Plan of care discussed by encouraged to call with needs.

## 2021-02-12 PROCEDURE — 770002 HCHG ROOM/CARE - OB PRIVATE (112)

## 2021-02-12 PROCEDURE — 700102 HCHG RX REV CODE 250 W/ 637 OVERRIDE(OP): Performed by: OBSTETRICS & GYNECOLOGY

## 2021-02-12 PROCEDURE — A9270 NON-COVERED ITEM OR SERVICE: HCPCS | Performed by: OBSTETRICS & GYNECOLOGY

## 2021-02-12 RX ADMIN — OXYCODONE HYDROCHLORIDE AND ACETAMINOPHEN 1 TABLET: 5; 325 TABLET ORAL at 11:15

## 2021-02-12 RX ADMIN — OXYCODONE HYDROCHLORIDE AND ACETAMINOPHEN 1 TABLET: 10; 325 TABLET ORAL at 00:53

## 2021-02-12 RX ADMIN — IBUPROFEN 600 MG: 600 TABLET, FILM COATED ORAL at 17:37

## 2021-02-12 RX ADMIN — PRENATAL WITH FERROUS FUM AND FOLIC ACID 1 TABLET: 3080; 920; 120; 400; 22; 1.84; 3; 20; 10; 1; 12; 200; 27; 25; 2 TABLET ORAL at 11:15

## 2021-02-12 RX ADMIN — DOCUSATE SODIUM 100 MG: 100 CAPSULE, LIQUID FILLED ORAL at 11:15

## 2021-02-12 RX ADMIN — IBUPROFEN 600 MG: 600 TABLET, FILM COATED ORAL at 11:15

## 2021-02-12 RX ADMIN — OXYCODONE HYDROCHLORIDE AND ACETAMINOPHEN 1 TABLET: 5; 325 TABLET ORAL at 05:46

## 2021-02-12 RX ADMIN — SIMETHICONE 80 MG: 80 TABLET, CHEWABLE ORAL at 11:15

## 2021-02-12 RX ADMIN — OXYCODONE HYDROCHLORIDE AND ACETAMINOPHEN 1 TABLET: 5; 325 TABLET ORAL at 17:37

## 2021-02-12 RX ADMIN — IBUPROFEN 600 MG: 600 TABLET, FILM COATED ORAL at 05:41

## 2021-02-12 ASSESSMENT — PAIN DESCRIPTION - PAIN TYPE
TYPE: SURGICAL PAIN

## 2021-02-12 NOTE — CARE PLAN
Problem: Altered physiologic condition related to postoperative  delivery  Goal: Patient physiologically stable as evidenced by normal lochia, palpable uterine involution and vital signs within normal limits  Outcome: PROGRESSING AS EXPECTED  Note: Fundus firm, lochia light     Problem: Potential for postpartum infection related to surgical incision, compromised uterine condition, urinary tract or respiratory compromise  Goal: Patient will be afebrile and free from signs and symptoms of infection  Outcome: PROGRESSING AS EXPECTED  Note: VSS. No signs or symptoms of infection noted

## 2021-02-12 NOTE — CARE PLAN
Problem: Altered physiologic condition related to postoperative  delivery  Goal: Patient physiologically stable as evidenced by normal lochia, palpable uterine involution and vital signs within normal limits  Outcome: PROGRESSING AS EXPECTED  Note: Fundus firm. Lochia light to scant. Vital signs WDL.       Problem: Alteration in comfort related to surgical incision and/or after birth pains  Goal: Patient is able to ambulate, care for self and infant with acceptable pain level  Outcome: PROGRESSING AS EXPECTED  Note: Pt able to ambulate, care for self and infant. Pt states her pain is controlled with prn pain medication. Pt's pain reassessed throughout this shift.

## 2021-02-12 NOTE — LACTATION NOTE
This note was copied from a baby's chart.  SARAH is a 41 y/o  who delivered a 39 6/7 gestation infant by primary c/section for FITL. SARAH has firm primip breasts with inverted (?pseudo) nipples. He has been unbale to latch since delivery. Therefore, she has been pumping and supplementing with DBM. Attempted latch, but infant had just received 10 ml DBM. Review different positions to achieved a latch with encouragement to call for support as infant is showing cues. Discussion of a nipple shield to which she stated she would like to try til morning.    SARAH has HTH and already picked up her personal pump, but would like info before discharge to rent the HG. Will need f/u outpatient after discharge. Lactation to follow while inpatient.

## 2021-02-12 NOTE — PROGRESS NOTES
Progress Note        Subjective: Patient is doing well lochia is normal.  Has normal GI and  function.     Objective: Vital signs are normal  General: Patient's awake alert pleasant  Head: Atraumatic normocephalic  Abdomen: Fundus is firm, appropriate tenderness  Incision: Clean and dry    Assessment:  Postpartum day 2  section for arrest of dilation    Plan:  Routine care  Patient will like to stay 1 more day      Arielle Mayers MD

## 2021-02-13 VITALS
WEIGHT: 156 LBS | DIASTOLIC BLOOD PRESSURE: 75 MMHG | RESPIRATION RATE: 18 BRPM | HEIGHT: 60 IN | HEART RATE: 65 BPM | BODY MASS INDEX: 30.63 KG/M2 | TEMPERATURE: 97.6 F | SYSTOLIC BLOOD PRESSURE: 123 MMHG | OXYGEN SATURATION: 98 %

## 2021-02-13 PROCEDURE — A9270 NON-COVERED ITEM OR SERVICE: HCPCS | Performed by: OBSTETRICS & GYNECOLOGY

## 2021-02-13 PROCEDURE — 700102 HCHG RX REV CODE 250 W/ 637 OVERRIDE(OP): Performed by: OBSTETRICS & GYNECOLOGY

## 2021-02-13 RX ORDER — IBUPROFEN 600 MG/1
600 TABLET ORAL EVERY 6 HOURS PRN
Qty: 30 TABLET | Refills: 1 | Status: SHIPPED | OUTPATIENT
Start: 2021-02-13 | End: 2022-12-14

## 2021-02-13 RX ORDER — OXYCODONE HYDROCHLORIDE AND ACETAMINOPHEN 5; 325 MG/1; MG/1
1 TABLET ORAL EVERY 6 HOURS PRN
Qty: 25 TABLET | Refills: 0 | Status: SHIPPED | OUTPATIENT
Start: 2021-02-13 | End: 2021-02-20

## 2021-02-13 RX ADMIN — IBUPROFEN 600 MG: 600 TABLET, FILM COATED ORAL at 06:23

## 2021-02-13 RX ADMIN — OXYCODONE HYDROCHLORIDE AND ACETAMINOPHEN 1 TABLET: 5; 325 TABLET ORAL at 06:23

## 2021-02-13 RX ADMIN — IBUPROFEN 600 MG: 600 TABLET, FILM COATED ORAL at 00:09

## 2021-02-13 RX ADMIN — IBUPROFEN 600 MG: 600 TABLET, FILM COATED ORAL at 12:02

## 2021-02-13 RX ADMIN — DOCUSATE SODIUM 100 MG: 100 CAPSULE, LIQUID FILLED ORAL at 12:05

## 2021-02-13 RX ADMIN — DOCUSATE SODIUM 100 MG: 100 CAPSULE, LIQUID FILLED ORAL at 00:09

## 2021-02-13 RX ADMIN — PRENATAL WITH FERROUS FUM AND FOLIC ACID 1 TABLET: 3080; 920; 120; 400; 22; 1.84; 3; 20; 10; 1; 12; 200; 27; 25; 2 TABLET ORAL at 09:26

## 2021-02-13 RX ADMIN — OXYCODONE HYDROCHLORIDE AND ACETAMINOPHEN 1 TABLET: 5; 325 TABLET ORAL at 00:09

## 2021-02-13 RX ADMIN — OXYCODONE HYDROCHLORIDE AND ACETAMINOPHEN 1 TABLET: 5; 325 TABLET ORAL at 12:03

## 2021-02-13 ASSESSMENT — EDINBURGH POSTNATAL DEPRESSION SCALE (EPDS)
I HAVE BEEN ANXIOUS OR WORRIED FOR NO GOOD REASON: HARDLY EVER
I HAVE BEEN SO UNHAPPY THAT I HAVE BEEN CRYING: NO, NEVER
I HAVE BEEN ABLE TO LAUGH AND SEE THE FUNNY SIDE OF THINGS: AS MUCH AS I ALWAYS COULD
I HAVE FELT SAD OR MISERABLE: NO, NOT AT ALL
I HAVE FELT SCARED OR PANICKY FOR NO GOOD REASON: NO, NOT AT ALL
I HAVE BLAMED MYSELF UNNECESSARILY WHEN THINGS WENT WRONG: YES, SOME OF THE TIME
THINGS HAVE BEEN GETTING ON TOP OF ME: NO, I HAVE BEEN COPING AS WELL AS EVER
THE THOUGHT OF HARMING MYSELF HAS OCCURRED TO ME: NEVER
I HAVE BEEN SO UNHAPPY THAT I HAVE HAD DIFFICULTY SLEEPING: NOT AT ALL
I HAVE LOOKED FORWARD WITH ENJOYMENT TO THINGS: AS MUCH AS I EVER DID

## 2021-02-13 NOTE — DISCHARGE INSTRUCTIONS
POSTPARTUM DISCHARGE INSTRUCTIONS FOR MOM    YOB: 1980   Age: 40 y.o.               Admit Date: 2/9/2021     Discharge Date: 2/13/2021  Attending Doctor:  La Lacey M.D.                  Allergies:  Patient has no known allergies.    Discharged to home by car. Discharged via wheelchair, hospital escort: Yes.  Special equipment needed: Not Applicable  Belongings with: Personal  Be sure to schedule a follow-up appointment with your primary care doctor or any specialists as instructed.     Discharge Plan:   Diet Plan: Discussed  Activity Level: Discussed  Confirmed Follow up Appointment: Patient to Call and Schedule Appointment  Confirmed Symptoms Management: Discussed  Medication Reconciliation Updated: Yes  Influenza Vaccine Indication: Not indicated: Previously immunized this influenza season and > 8 years of age    QUIT SMOKING/TOBACCO USE:  I understand the use of any tobacco products increases my chance of suffering from future heart disease and could cause other illnesses which may shorten my life. Quitting the use of tobacco products is the single most important thing I can do to improve my health. For further information on smoking / tobacco cessation call a Toll Free Quit Line at 1-661.185.5985 (*National [employer] [place]) or 1-556.961.5972 (American Lung Association) or you can access the web based program at www.lungusa.org.    · [place] Tobacco Users Help Line:  (549) 808-5954       Toll Free: 1-786.394.9549  · Quit Tobacco Program [facility] (213)504-2471    DEPRESSION / SUICIDE RISK:  As you are discharged from this [facility], it is important to learn how to keep safe from harming yourself.    Recognize the warning signs:  · Abrupt changes in personality, positive or negative- including increase in energy   · Giving away possessions  · Change in eating patterns- significant weight changes-  positive or negative  · Change in sleeping patterns- unable  to sleep or sleeping all the time   · Unwillingness or inability to communicate  · Depression  · Unusual sadness, discouragement and loneliness  · Talk of wanting to die  · Neglect of personal appearance   · Rebelliousness- reckless behavior  · Withdrawal from people/activities they love  · Confusion- inability to concentrate     If you or a loved one observes any of these behaviors or has concerns about self-harm, here's what you can do:  · Talk about it- your feelings and reasons for harming yourself  · Remove any means that you might use to hurt yourself (examples: pills, rope, extension cords, firearm)  · Get professional help from the community (Mental Health, Substance Abuse, psychological counseling)  · Do not be alone:Call your Safe Contact- someone whom you trust who will be there for you.  · Call your local CRISIS HOTLINE 309-7715 or 381-219-7437  · Call your local Children's Mobile Crisis Response Team [place] (921) 829-5568 or www.[employer]  · Call the toll free National Suicide Prevention Hotlines   · National Suicide Prevention Lifeline 091-006-TSXC (4524)  · National Hope Line Network 800-SUICIDE (158-1234)    DISCHARGE SURVEY:  Thank you for choosing [facility].  We hope we provided you with very good care.  You may be receiving a survey in the mail.  Please fill it out.  Your opinion is valuable to us.    ADDITIONAL EDUCATIONAL MATERIALS GIVEN TO PATIENT:        My signature on this form indicates that:  1.  I have reviewed and understand the above information  2.  My questions regarding this information have been answered to my satisfaction.  3.  I have formulated a plan with my discharge nurse to obtain my prescribed medication for home.

## 2021-02-13 NOTE — CARE PLAN
Problem: Altered physiologic condition related to postoperative  delivery  Goal: Patient physiologically stable as evidenced by normal lochia, palpable uterine involution and vital signs within normal limits  Outcome: PROGRESSING AS EXPECTED  Note: Fundus firm and lochia scant.      Problem: Pain Management  Goal: Pain level will decrease to patient's comfort goal  Outcome: PROGRESSING AS EXPECTED  Note: Pt requests pain meds as scheduled.

## 2021-02-13 NOTE — CARE PLAN
Problem: Altered physiologic condition related to postoperative  delivery  Goal: Patient physiologically stable as evidenced by normal lochia, palpable uterine involution and vital signs within normal limits  Outcome: PROGRESSING AS EXPECTED  Note: Fundus firm, lochia scant and rubra. No clots at this time. Vitals stable.      Problem: Potential for postpartum infection related to surgical incision, compromised uterine condition, urinary tract or respiratory compromise  Goal: Patient will be afebrile and free from signs and symptoms of infection  Outcome: PROGRESSING AS EXPECTED  Note: No s/s of infection present on assessment. Afebrile.

## 2021-02-13 NOTE — LACTATION NOTE
This note was copied from a baby's chart.  Using nipple shield for baby. Technique reinforced, potential engorgement discussed. Baby latching and breast feeding well with nipple shields. We discussed feeding cues and future weaning from shield with out-patient guidance.     Follow up with out-patient Lactation to be scheduled.    Hand expressing and pumping reviewed for milk stimulation and complete emptying of breasts.    Smaller breast pump flanges provided so mom can try on her left breast-see assessment flow-sheet for further notes.

## 2021-02-13 NOTE — PROGRESS NOTES
Assumed care of patient. Assessment complete. Fundus is firm, with light lochia rubra. Pain level is 4/10, denies pain medication at this time. Would like medication as it becomes available every 6 hours. Encouraged patient to call for any breakthrough pain. Bed is locked and in low position. Call light left within reach and encouraged to call for any needs if necessary.

## 2021-02-13 NOTE — PROGRESS NOTES
Assumed care. Assessment completed, VSS. Fundus firm, lochia scant. Pt. Ambulating and voiding. Pt. requests pain medication as scheduled.POC discussed, all questions answered. Encouraged to call with needs.

## 2021-02-13 NOTE — PROGRESS NOTES
Patient ready for discharge. Infant checked in carseat for safety. Patient has no questions or concerns regarding discharge at this time.  Escorted out of the hospital in wheelchair by staff.

## 2021-02-13 NOTE — PROGRESS NOTES
Progress Note    Pauline Gaby Lila   Post-op day 3  Chief Admitting Dx: Labor and delivery, indication for care [O75.9]  Delivery Type:  for fetal distress      Subjective:  Ambulating: voiding, tolerating diet, normal lochia, pain controlled.  Desires to go home.   Vitals:    21 2146 21 0220 21 0506 21 1058   BP: 119/77  123/75    Pulse: 79 81 65    Resp: 18 18 18    Temp: 36.6 °C (97.9 °F) 36.2 °C (97.1 °F) 36.4 °C (97.5 °F) 36.4 °C (97.6 °F)   TempSrc: Temporal Temporal Temporal Axillary   SpO2: 98% 98% 98%    Weight:       Height:           Exam:  Gen: no acute distress  Abdomen: soft nt/nd firm fundus  INc: c/d/i with mepelex dressing  Ext: No calf pain killian LE    Labs:   No results found for this or any previous visit (from the past 24 hour(s)).    Assessment:  Pod     Plan:  Discharge home  Encourage ambulation  Pelvic rest, no heavy lifting/pulling /pushing  F/u in my office 2 weeks for a c/s check if indicated or 6 weeks postpartum  Continue prenatal vitamins daily  Give Rhogam if Rh negative and indicated  Give MMR if indcated prior to discharge  Encourage breastfeeding  Remove dressing in one week    Ivanna Jeronimo M.D.

## 2021-02-14 NOTE — DISCHARGE SUMMARY
DATE OF ADMISSION:  2021   DATE OF DISCHARGE:  2021     PRINCIPAL DIAGNOSES:    1.  Intrauterine pregnancy at 39 weeks gestation.  2.  Nonreassuring fetal heart rate tracing.  3.  Advanced maternal age.     PRINCIPAL PROCEDURE:  Primary low transverse  section via Pfannenstiel   skin incision.     HOSPITAL COURSE:  The patient is 40 and 5/7 weeks' gestation, underwent a   primary  for nonreassuring fetal heart rate tracing with repetitive   late decelerations.  There was meconium-stained amniotic fluid.  Male infant   was born, occiput anterior position.  Thick meconium.  Apgars 8 and 8, weight   3280 grams, otherwise uneventful  section.  By postoperative day #3,   she is ambulating, voiding, tolerating regular diet.  Pain is controlled with   p.o. pain medication and she will be discharged home.  She will go home with   Motrin and Percocet.  She will follow up with Dr. Lacey in 2 weeks, sooner   if needed.  Verbal instructions given and questions answered.  Her   postoperative hematocrit was 34.1.  She is A positive.        ______________________________  MD ABBEY WOOD/VIJAY/FRANKLIN    DD:  2021 13:15  DT:  2021 15:34    Job#:  [id number]

## 2021-02-22 ENCOUNTER — OFFICE VISIT (OUTPATIENT)
Dept: OBGYN | Facility: CLINIC | Age: 41
End: 2021-02-22
Payer: COMMERCIAL

## 2021-02-22 DIAGNOSIS — O92.70 LACTATION PROBLEM: ICD-10-CM

## 2021-02-22 DIAGNOSIS — O92.5 LACTATION SUPPRESSION: ICD-10-CM

## 2021-02-22 PROCEDURE — 99417 PROLNG OP E/M EACH 15 MIN: CPT | Performed by: NURSE PRACTITIONER

## 2021-02-22 PROCEDURE — 99215 OFFICE O/P EST HI 40 MIN: CPT | Performed by: NURSE PRACTITIONER

## 2021-02-22 NOTE — PROGRESS NOTES
Summary: Difficulty with breastfeeding in hospital, nipple shield started along with pumping. Home and pumping 5x/day for 2/3 of the milk, formula supplement. Good infant growth. Today no bare breast sustained latch but did go to both sides with a 24mm NS and removed 54ml total. Plan now is to pump 8x/day to make milk, offer breast at day time feedings with NS for practice and then top off. Follow up in a week for weight and feeding evaluation.     Subjective:     Pauline Sharp is a 40 y.o. female here for lactation care. She is here today with baby and  (Praveen).    Josemanuel Sharp is a   female here for lactation care. History is provided by family.    Concerns:   Latch on difficulties  and feeling that there is not enough milk   HPI:   Pertinent  history: c/section  Mother does not have a history of GDM, hypertension prior to pregnancy, insulin resistance, multiple gestation, PCOS and thyroid disease. Common condition(s) which may interfere with milk supply.    Mother does have advanced maternal age. Common condition(s) that may interfere in milk supply.    Breast changes in pregnancy: Yes  History of breast surgeries: No      FEEDING HISTORY:    Past breastfeeding history:  First baby   Hospital course: Difficulty with breastfeeding in hospital,   Currently 2021 nipple shield started along with pumping 5x/day.     Both breasts: Yes  Bottle feeds: 5-6x/24h    Supplement: Supplementation initiated inpatient, Expressed breast milk and Formula  Quantity: 3oz  How given/devices:  Bottle    Nipple Shield Use: 20 mm and 24 mm  Recommended by: IP  When started? IP    Breast Pumping:   Frequency: 5-6x/day  Type of pump: Ameda Platinum  Flange size/type: 25mm  NO pain with pumping    Maternal ROS:  Constitutional: No fever, chills. Feeling well  Breasts: No soreness of breasts and No soreness of nipples  Psychiatric: Managing ok  Mental Health: No mention of feeling irritable, agitated, angry,  overwhelmed, apathy, exhaustion nor having sleep changes outside infant feeds/demands or appetite changes     Objective:     Maternal Physical   General: No acute distress  Breasts: Symetrical , Soft, Plugged Duct - no evidence and Mastitis  - no S/S Denser tissue  Nipples: intact  Psychiatric:  Normal mood and affect. Her behavior is normal. Judgment and thought content normal   Mental Health:  Did NOT exhibit sadness, crying, feeling overwhelmed, agitation or hypervigilance.    Infant Jarrod  Assessment/Plan & Lactation Counseling:     Infant Weight History:   2/10/21: 7#3.7oz  2/22/2021: 7#5.0oz    Infant intake at Breast:: L 33ml     R 21ml    Total: 51ml  Milk Transfer at this feeding:   Ineffective breastfeeding; not able to transfer a full feed from breast r/t learning but did remove 40 and 66% of available milk so excellent start.  Pumped: Type of Pump: Platinum    Quantity Pumped: L 22ml    R 11ml    Total: 33ml  Initiation of Feeding: Infant initiates  Position of Feeding:    Right: football and cross cradle  Left: football and cross cradle  Attachment Achieved: with difficulty bare breast  Nipple shield: N/A       Suck Pattern at the breast: Suck burst and normal rest on nipple shield once milk let down  Suck Pattern on the bottle: Suck burst and normal rest  Behavior Following Observed Feeding: content  Nipple Pain: None      Latch: Assisted latch and Latch difficulty without nipple shield  Suckling/Feeding: attaches, audible swallows, baby roots, elicits LAWSON and frequent pauses  Milk Supply Available: low building  Low milk supply:   Likely due to: delay in lactogenesis II and ineffective or infrequent breast stimulation or milk removal    Maternal Diagnosis/Problem:  Lactation suppression  Lactation problem baby not latching    BREASTFEEDING PLAN  Discussed concerns and symptoms as listed above in assessment and guidance summarized below.  Topics reviewed included:  •  Herbs and medications for increasing  supply and their potential side effects and efficacy. No evidence base exists to support their use  •  The nature of infants oral head/neck structure and function and its impact on latch and transfer of milk.   o Discussed neck, tongue, in uterine position  • Neck preference this is a normal  finding that should correct itself over the next few weeks.    o However when there is a neck preference it can make latching more difficult.    o Infant head can be supported in the car seat.    o Bottle feeding baby's can be encouraged to look to the opposite side of the preference  o Infant can be can talked to from the side encouraging baby to turn to.     •  Triple feeding and its sustainability and its impact on the mother baby relationship now that breastfeeding has been reintroduced   Sleep ing well baby taking 6 feedings per day with good growth    • Milk supply is dependent on glandular tissue development, hormonal influences, how many times the baby removes milk and how well the breasts are emptied in a 24 hour period. This is a biological reality that we can NOT work around. If, for any reason, your baby is not latching, or you are not able to nurse, then it is important for you to remove the milk instead by pumping or hand expression.  There's no magic trick, tea, food, drink, cookie or supplement that will increase your milk supply. One  must  effectively remove milk to continue to make and maximize milk. In the early days and weeks that can be 8+ times in 24 hours. For older babies, on average 6-7 + times in 24 hours.      • Low Milk Supply: Causes  o Lack of nipple/breast stimulation (Baby at the breast but not suckling, mostly non nutritive sucking)  o Lack of breast emptying (Baby at the breast but no removing much milk)    •  Feeding:   o Feed your baby every 1.5-3 hours, more often if baby acts hungry.   o Awaken baby for feeding if going over 3 hours in the day.     •  When bottle-feeding, there are  three primary things to consider:    o Nipple Shape:  - Look for a nipple that looks like a “breast at work” not a “breast at rest.”  A “breast at work” has a somewhat cone shape as the nipple and breast tissue is pulled into the baby’s mouth while feeding.  Your baby’s mouth should be able to go around the widest part of the nipple to form a wide-open gape on the bottle like that of a good latch at the breast. In contrast, a breast at rest might look more like, well, a breast: a roundish base with a long skinny nipple.  If the bottle looks like this, your baby’s lips may not be able to get around the widest part of the nipple because it is just too wide resulting in a narrow gape that would hurt on your nipple. This nipple shape may also make it difficult for your baby to make a complete seal with their lips which leads to air intake and milk spillage.  o Flow Rate of the Nipple:  - The nipple flow should be slow.  Don’t just read the label, but notice how your baby is feeding and trust what you observe.  A study done a few years ago found that “slow flow” varied widely between brands and even between nipples of the same brand.  Try several nipples until you find one that results in a rhythmic sucking pattern but not chugging and gulping.  o Pacing the feeding:  - A slow flow nipple helps, but how you feed the baby is more important.  Good positioning can compensate for a faster flow nipple.  When bottle-feeding, the baby should control how much is consumed at a feeding.  Holding the baby in an upright position with the bottle horizontal ensures that the baby gets milk only when sucking.  Here is a nice video demonstrating this concept of paced bottle feeding,  https://www.youHypori.com/watch?v=PwBXQ2eTB9P    •  Pacifier Use:  The American [employer] Position Paper reports: Although we recommend a conservative approach regarding pacifier use, we do not endorse a complete ban on the use of pacifiers, nor  do we support an approach that induces parental guilt concerning their choices about the use of pacifiers.    •  Supplement:   o Supplement with expressed milk  o Supplement with formula    • Positioning Techniques for bare breast  o Suggested positions Cross cradle and Football  o Fine tune position by making sure your fingers beneath the breast as well as your bra, are out of the way of your baby's chin.  o Positioning:  Many positions shown, great sidelying at 7 minutes.   o See http://globalhealthmedia.org/portfolio-items/positions-for-breastfeeding/?xslxrqjkyGU=40280    •  Latch on Techniques for bare breast. Modify for nipple shield use  o Fine tune latch:  - By holding your baby more securely at the breast, assisting your baby to stay attached by:  - Bringing your baby to your breast, not breast to the baby  - Your baby's cheek to touch breast securely, nose tipped back  - Hold your baby firmly in place so when your baby forgets to suck and picks it back up again your baby is in the correct spot. You will be extinguishing behavior and replacing it with a deeper latch to stimulate suck and provide satisfaction at the breast  - Your baby needs as much breast as deep in the mouth as possible to allow your nipples to heal and for you more importantly to maximize efficiency at the breast  - Latch is asymmetrical, leading with the chin, getting more underneath.    •  Nipple shield: We prefer the 24mm Medela. Before applying, roll shield in on itself and allow breast to be pulled  in to the shield tip.     •  Triple feeding: A short term solution: Breast/bottle/pump:  o FIRST decide what you can do at that feeding and you may decide to double feed -pump and bottle, if you are going to offer the breast at that feeding:  - nurse first and limit time on the breasts to 20+/- min total  - finish with bottle  - pump afterwards to finish emptying your breasts which will help increase milk supply  - use your own pumped  milk, formula or donor human milk  - 30gm or ml = 1oz    •  Pumping Guidelines:  o Both breasts Pump 8 times in 24 hours  o Type of pump:  - Hospital grade and [place]  - http://www.[email or web address]/healthcare-professionals/videos/ameda-platinum-with-hygienikit-video  - Always double pump  - May introduce personal pump for convenience 1x/day  o How long will vary woman to woman, typically 8-15 minutes, or 1-2 minutes after flow slows  o Flange Fit: Freely moving nipple in the tunnel with some movement of the areola.  - Today's evaluation indicates a smaller flange change would be appropriate    •  Increasing supply besides Galactogogues and Pumping:   o Warmth  o Relaxation   o Physical, auditory narratives  o Childbirth relaxation Techniques  o Acupuncture and acupressure  o Shoulder Massages  o Take a nap  •  Connect with other mothers:  o Facebook:   - [facility] Breast[facility]eds: https://www.[employer].com/[facility]vResource Capital.breastfeeds/  - Well-Nourished Babies (Private group for questions and support): https://www.[employer].com/groups/789086389551822/  o Breastfeeding [place]   - This is an emotional, informational and safe support [place] with your like-minded community that builds solidarity among moms  - The honest experiences of mothers are highly valued among the other mothers  - Tuesday  at 11am by Zorima,  you will be sent an invitation.   - You may instead copy and paste this link:    [email or web address]    - Passcode  [id number]  - You may share this link with friends; please don't post on social media.    Follow up requires close monitoring in this time sensitive window of opportunity to establish milk supply and facilitate the learning of  breastfeeding.    Mom is encouraged to e-mail to update how the plan is working.    Pediatrician appointment: This week    Follow-up for infant weight check and dyad breastfeeding evaluation in 7 day(s)  Please call [phone] if you  have not scheduled your next appointment    A total of 82 minutes, not including infant assessment time, with more than 50% was spent preparing to see the patient, obtaining and reviewing separately obtained history, performing a medically appropriate examination and evaluation, counseling and educating the family, documenting clinical information in the electronic health record, independently interpreting weighted feeds and infant growth results, communicating these results to the family and care coordination as detailed in the above note.       PLEASE NOTE: Some of this note was created using voice recognition software. I have made every reasonable attempt to correct obvious errors, but I expect that there may be errors of grammar and possibly content that I did not discover prior finalizing this note.  DARRIN Campos.

## 2021-03-01 ENCOUNTER — OFFICE VISIT (OUTPATIENT)
Dept: OBGYN | Facility: CLINIC | Age: 41
End: 2021-03-01
Payer: COMMERCIAL

## 2021-03-01 DIAGNOSIS — O92.5 LACTATION SUPPRESSION: ICD-10-CM

## 2021-03-01 DIAGNOSIS — O92.70 LACTATION PROBLEM: ICD-10-CM

## 2021-03-01 PROCEDURE — 99215 OFFICE O/P EST HI 40 MIN: CPT | Performed by: NURSE PRACTITIONER

## 2021-03-01 NOTE — PROGRESS NOTES
Summary: Impressive gain in breastfeeding management and supply increase, although not 100% yet. Using NS at breast and baby latching consistently. Pumping 8x/ 24 hours and supply increasing, not yet full, but closer to 90% from 60%. Taxing though and limited support.  Good infant growth today. Mom had pumped 1.5hours earlier but baby removed 24ml of the 32 available showing improved effectiveness. He likes to eat. Topped of with an ounce and would have taken more but content with pacifier. Plan to continue with pumping coordinated with feedings, not imposed by the clock.  Get ahead with formula so pumped milk can be available at the next feeding to reduce pressure to get it done. Skip breast at  times to pump and bottle only.Follow up in 10-14 days to adjust plan. WCC with ped in 2 weeks.         Subjective:     Pauline Sharp is a 40 y.o. female here for lactation care. She is here today with baby and  (Praveen).      Concerns:   Latch on difficulties  and feeling that there is not enough milk   HPI:   Pertinent  history: c/section  Mother does not have a history of GDM, hypertension prior to pregnancy, insulin resistance, multiple gestation, PCOS and thyroid disease. Common condition(s) which may interfere with milk supply.    Mother does have advanced maternal age. Common condition(s) that may interfere in milk supply.    Breast changes in pregnancy: Yes  History of breast surgeries: No      FEEDING HISTORY:    Past breastfeeding history:  First baby   Hospital course: Difficulty with breastfeeding in hospital,   Prior to consult on 2021 nipple shield started along with pumping 5x/day.   Currently  3/1/2021 Using NS at breast and baby latching consistently. Pumping 8x/ 24 hours and supply increasing,     Both breasts: Yes  Bottle feeds: 8x24h    Supplement: Supplementation initiated inpatient, Expressed breast milk and Formula  Quantity: 3oz  How given/devices:  Bottle    Nipple Shield Use:  24 mm  Recommended by: IP  When started? IP    Breast Pumping:   Frequency: 8x/day  Type of pump: Ameda Platinum  Flange size/type: 25mm  NO pain with pumping    Maternal ROS:  Constitutional: No fever, chills. Feeling well  Breasts: No soreness of breasts and No soreness of nipples  Psychiatric: Managing ok  Mental Health: No mention of feeling irritable, agitated, angry, overwhelmed, apathy, exhaustion nor having sleep changes outside infant feeds/demands or appetite changes     Objective:     Maternal Physical   General: No acute distress  Breasts: Symetrical , Soft, Plugged Duct - no evidence and Mastitis  - no S/S Denser tissue  Nipples: intact  Psychiatric:  Normal mood and affect. Her behavior is normal. Judgment and thought content normal   Mental Health:  Did NOT exhibit sadness, crying, feeling overwhelmed, agitation or hypervigilance.    Infant Jarrod  Assessment/Plan & Lactation Counseling:     Infant Weight History:   2/10/21: 7#3.7oz  2/22/2021: 7#5.0oz  3/1/2021 7#11.4oz      Infant intake at Breast:: L 12ml     R 12ml    Total: 24ml  (pumped 1.5hr ago)  Milk Transfer at this feeding: Much more effective today removing 75% of available milk  Pumped: Type of Pump: Platinum    Quantity Pumped: Total: 11ml  Initiation of Feeding: Infant initiates  Position of Feeding:    Right: cross cradle  Left:  cross cradle  Attachment Achieved: NS - easily done refined not to latch like bare breast  Nipple shield: N/A       Suck Pattern at the breast: Suck burst and normal rest on nipple shield once milk let down  Suck Pattern on the bottle: Suck burst and normal rest, very serious  Behavior Following Observed Feeding: content  Nipple Pain: None      Latch: Assisted latch and Latch difficulty without nipple shield  Suckling/Feeding: attaches, audible swallows, baby roots, elicits LAWSON and frequent pauses  Milk Supply Available: low building and improving  Low milk supply:   Likely due to: delay in lactogenesis II and  ineffective or infrequent breast stimulation or milk removal    Maternal Diagnosis/Problem:  Lactation suppression  Lactation problem baby not latching without nipple shield    BREASTFEEDING PLAN  Discussed concerns and symptoms as listed above in assessment and guidance summarized below.    •  Triple feeding and its sustainability and its impact on the mother baby relationship now that breastfeeding has been reintroduced. Reviewed and mom appropriately teary given all the work.    • Milk supply is dependent on glandular tissue development, hormonal influences, how many times the baby removes milk and how well the breasts are emptied in a 24 hour period. This is a biological reality that we can NOT work around. If, for any reason, your baby is not latching, or you are not able to nurse, then it is important for you to remove the milk instead by pumping or hand expression.  There's no magic trick, tea, food, drink, cookie or supplement that will increase your milk supply. One  must  effectively remove milk to continue to make and maximize milk. In the early days and weeks that can be 8+ times in 24 hours. For older babies, on average 6-7 + times in 24 hours.      • Low Milk Supply: Causes  o Lack of nipple/breast stimulation (Baby at the breast but not suckling, mostly non nutritive sucking)  o Lack of breast emptying (Baby at the breast but no removing much milk)    •  Feeding:   o Feed your baby every 1.5-3 hours, more often if baby acts hungry.   • Awaken baby for feeding if going over 3 hours in the day. use of pacifiers.    •  Supplement:   o Supplement with expressed milk  o Supplement with formula    • Positioning and Latch - Beautiful work, fine tuned latch with nipple shield, letting him find it.  Pumping Guidelines:  o Both breasts Pump 8 times in 24 hours NOT every 2 hours  o Type of pump:  - Hospital grade and  [place]  - [email or web address]/healthcare-professionals/videos/ameda-platinum-[email or web address]-hygienikit-video  - Always double pump  - May introduce personal pump for convenience 1x/day  o How long will vary woman to woman, typically 8-15 minutes, or 1-2 minutes after flow slows  o Flange Fit: Freely moving nipple in the tunnel with some movement of the areola.  - Today's evaluation smaller flange is working better    •  Connect with other mothers:  o Facebook:   - [place] [facility]: [email or web address].[email or web address]  - Well-Nourished Babies (Private group for questions and support): https://www.[employer].com/groups/585818151350953/  o Breastfeeding [place]   - This is an emotional, informational and safe support [place] with your like-minded community that builds solidarity among moms  - The honest experiences of mothers are highly valued among the other mothers  - Tuesday  at 11am by [facility],  you will be sent an invitation.   - You may instead copy and paste this link:    [email or web address]    - Passcode  [id number]  - You may share this link with friends; please don't post on social media.    Follow up requires close monitoring in this time sensitive window of opportunity to establish milk supply and facilitate the learning of  breastfeeding.    Mom is encouraged to e-mail to update how the plan is working.    Pediatrician appointment: 1 month [facility]    Follow-up for infant weight check and dyad breastfeeding evaluation in 10-14 day(s)  Please call [phone] if you have not scheduled your next appointment    A total of 56 minutes, not including infant assessment time, with more than 50% was spent preparing to see the patient, obtaining and reviewing separately obtained history, performing a medically appropriate examination and evaluation, counseling and educating the family, documenting clinical information in the electronic health record, independently  interpreting weighted feeds and infant growth results, communicating these results to the family and care coordination as detailed in the above note.       PLEASE NOTE: Some of this note was created using voice recognition software. I have made every reasonable attempt to correct obvious errors, but I expect that there may be errors of grammar and possibly content that I did not discover prior finalizing this note.  DARRIN Campos.

## 2021-03-29 ENCOUNTER — HOSPITAL ENCOUNTER (OUTPATIENT)
Dept: LAB | Facility: MEDICAL CENTER | Age: 41
End: 2021-03-29
Attending: PHYSICIAN ASSISTANT
Payer: COMMERCIAL

## 2021-03-29 LAB
ALBUMIN SERPL BCP-MCNC: 4.3 G/DL (ref 3.2–4.9)
ALBUMIN/GLOB SERPL: 1.9 G/DL
ALP SERPL-CCNC: 97 U/L (ref 30–99)
ALT SERPL-CCNC: 114 U/L (ref 2–50)
ANION GAP SERPL CALC-SCNC: 7 MMOL/L (ref 7–16)
AST SERPL-CCNC: 53 U/L (ref 12–45)
BASOPHILS # BLD AUTO: 0.7 % (ref 0–1.8)
BASOPHILS # BLD: 0.04 K/UL (ref 0–0.12)
BILIRUB SERPL-MCNC: 0.6 MG/DL (ref 0.1–1.5)
BUN SERPL-MCNC: 10 MG/DL (ref 8–22)
CALCIUM SERPL-MCNC: 9.4 MG/DL (ref 8.5–10.5)
CHLORIDE SERPL-SCNC: 106 MMOL/L (ref 96–112)
CHOLEST SERPL-MCNC: 217 MG/DL (ref 100–199)
CO2 SERPL-SCNC: 25 MMOL/L (ref 20–33)
CREAT SERPL-MCNC: 0.76 MG/DL (ref 0.5–1.4)
EOSINOPHIL # BLD AUTO: 0.16 K/UL (ref 0–0.51)
EOSINOPHIL NFR BLD: 2.8 % (ref 0–6.9)
ERYTHROCYTE [DISTWIDTH] IN BLOOD BY AUTOMATED COUNT: 44 FL (ref 35.9–50)
FASTING STATUS PATIENT QL REPORTED: NORMAL
GLOBULIN SER CALC-MCNC: 2.3 G/DL (ref 1.9–3.5)
GLUCOSE SERPL-MCNC: 79 MG/DL (ref 65–99)
HCT VFR BLD AUTO: 41.1 % (ref 37–47)
HDLC SERPL-MCNC: 48 MG/DL
HGB BLD-MCNC: 13.8 G/DL (ref 12–16)
IMM GRANULOCYTES # BLD AUTO: 0.02 K/UL (ref 0–0.11)
IMM GRANULOCYTES NFR BLD AUTO: 0.3 % (ref 0–0.9)
LDLC SERPL CALC-MCNC: 142 MG/DL
LYMPHOCYTES # BLD AUTO: 2.04 K/UL (ref 1–4.8)
LYMPHOCYTES NFR BLD: 35.2 % (ref 22–41)
MCH RBC QN AUTO: 33.7 PG (ref 27–33)
MCHC RBC AUTO-ENTMCNC: 33.6 G/DL (ref 33.6–35)
MCV RBC AUTO: 100.5 FL (ref 81.4–97.8)
MONOCYTES # BLD AUTO: 0.46 K/UL (ref 0–0.85)
MONOCYTES NFR BLD AUTO: 7.9 % (ref 0–13.4)
NEUTROPHILS # BLD AUTO: 3.07 K/UL (ref 2–7.15)
NEUTROPHILS NFR BLD: 53.1 % (ref 44–72)
NRBC # BLD AUTO: 0 K/UL
NRBC BLD-RTO: 0 /100 WBC
PLATELET # BLD AUTO: 230 K/UL (ref 164–446)
PMV BLD AUTO: 10.6 FL (ref 9–12.9)
POTASSIUM SERPL-SCNC: 4.9 MMOL/L (ref 3.6–5.5)
PROT SERPL-MCNC: 6.6 G/DL (ref 6–8.2)
RBC # BLD AUTO: 4.09 M/UL (ref 4.2–5.4)
SODIUM SERPL-SCNC: 138 MMOL/L (ref 135–145)
TRIGL SERPL-MCNC: 136 MG/DL (ref 0–149)
WBC # BLD AUTO: 5.8 K/UL (ref 4.8–10.8)

## 2021-03-29 PROCEDURE — 85025 COMPLETE CBC W/AUTO DIFF WBC: CPT

## 2021-03-29 PROCEDURE — 84481 FREE ASSAY (FT-3): CPT

## 2021-03-29 PROCEDURE — 80061 LIPID PANEL: CPT

## 2021-03-29 PROCEDURE — 83550 IRON BINDING TEST: CPT

## 2021-03-29 PROCEDURE — 83540 ASSAY OF IRON: CPT

## 2021-03-29 PROCEDURE — 80053 COMPREHEN METABOLIC PANEL: CPT

## 2021-03-29 PROCEDURE — 84443 ASSAY THYROID STIM HORMONE: CPT

## 2021-03-29 PROCEDURE — 82728 ASSAY OF FERRITIN: CPT

## 2021-03-29 PROCEDURE — 84439 ASSAY OF FREE THYROXINE: CPT

## 2021-03-29 PROCEDURE — 36415 COLL VENOUS BLD VENIPUNCTURE: CPT

## 2021-03-30 LAB
FERRITIN SERPL-MCNC: 75 NG/ML (ref 10–291)
IRON SATN MFR SERPL: 71 % (ref 15–55)
IRON SERPL-MCNC: 182 UG/DL (ref 40–170)
T3FREE SERPL-MCNC: 3.03 PG/ML (ref 2–4.4)
T4 FREE SERPL-MCNC: 1.16 NG/DL (ref 0.93–1.7)
TIBC SERPL-MCNC: 255 UG/DL (ref 250–450)
TSH SERPL DL<=0.005 MIU/L-ACNC: 1.3 UIU/ML (ref 0.38–5.33)
UIBC SERPL-MCNC: 73 UG/DL (ref 110–370)

## 2021-04-12 ENCOUNTER — APPOINTMENT (RX ONLY)
Dept: URBAN - METROPOLITAN AREA CLINIC 20 | Facility: CLINIC | Age: 41
Setting detail: DERMATOLOGY
End: 2021-04-12

## 2021-04-12 DIAGNOSIS — L57.8 OTHER SKIN CHANGES DUE TO CHRONIC EXPOSURE TO NONIONIZING RADIATION: ICD-10-CM

## 2021-04-12 DIAGNOSIS — L81.4 OTHER MELANIN HYPERPIGMENTATION: ICD-10-CM

## 2021-04-12 DIAGNOSIS — L82.1 OTHER SEBORRHEIC KERATOSIS: ICD-10-CM

## 2021-04-12 DIAGNOSIS — Z87.2 PERSONAL HISTORY OF DISEASES OF THE SKIN AND SUBCUTANEOUS TISSUE: ICD-10-CM

## 2021-04-12 DIAGNOSIS — D22 MELANOCYTIC NEVI: ICD-10-CM | Status: UNCHANGED

## 2021-04-12 DIAGNOSIS — D18.0 HEMANGIOMA: ICD-10-CM

## 2021-04-12 PROBLEM — D22.62 MELANOCYTIC NEVI OF LEFT UPPER LIMB, INCLUDING SHOULDER: Status: ACTIVE | Noted: 2021-04-12

## 2021-04-12 PROBLEM — D22.5 MELANOCYTIC NEVI OF TRUNK: Status: ACTIVE | Noted: 2021-04-12

## 2021-04-12 PROBLEM — D48.5 NEOPLASM OF UNCERTAIN BEHAVIOR OF SKIN: Status: ACTIVE | Noted: 2021-04-12

## 2021-04-12 PROBLEM — D22.72 MELANOCYTIC NEVI OF LEFT LOWER LIMB, INCLUDING HIP: Status: ACTIVE | Noted: 2021-04-12

## 2021-04-12 PROBLEM — D22.61 MELANOCYTIC NEVI OF RIGHT UPPER LIMB, INCLUDING SHOULDER: Status: ACTIVE | Noted: 2021-04-12

## 2021-04-12 PROBLEM — D22.71 MELANOCYTIC NEVI OF RIGHT LOWER LIMB, INCLUDING HIP: Status: ACTIVE | Noted: 2021-04-12

## 2021-04-12 PROBLEM — D18.01 HEMANGIOMA OF SKIN AND SUBCUTANEOUS TISSUE: Status: ACTIVE | Noted: 2021-04-12

## 2021-04-12 PROCEDURE — ? DIAGNOSIS COMMENT

## 2021-04-12 PROCEDURE — 99213 OFFICE O/P EST LOW 20 MIN: CPT | Mod: 25

## 2021-04-12 PROCEDURE — ? BIOPSY BY SHAVE METHOD

## 2021-04-12 PROCEDURE — 11103 TANGNTL BX SKIN EA SEP/ADDL: CPT

## 2021-04-12 PROCEDURE — 11102 TANGNTL BX SKIN SINGLE LES: CPT

## 2021-04-12 PROCEDURE — ? OBSERVATION AND MEASURE

## 2021-04-12 PROCEDURE — ? COUNSELING

## 2021-04-12 ASSESSMENT — LOCATION DETAILED DESCRIPTION DERM
LOCATION DETAILED: RIGHT BUTTOCK
LOCATION DETAILED: LEFT DISTAL POSTERIOR THIGH
LOCATION DETAILED: RIGHT DISTAL POSTERIOR UPPER ARM
LOCATION DETAILED: RIGHT ANTERIOR DISTAL THIGH
LOCATION DETAILED: RIGHT PROXIMAL DORSAL FOREARM
LOCATION DETAILED: LEFT DISTAL POSTERIOR UPPER ARM
LOCATION DETAILED: RIGHT INFERIOR UPPER BACK
LOCATION DETAILED: PERIUMBILICAL SKIN
LOCATION DETAILED: LEFT ANTERIOR PROXIMAL UPPER ARM
LOCATION DETAILED: RIGHT DISTAL POSTERIOR THIGH
LOCATION DETAILED: RIGHT LATERAL BUTTOCK
LOCATION DETAILED: RIGHT DISTAL DORSAL FOREARM
LOCATION DETAILED: RIGHT ANTERIOR PROXIMAL UPPER ARM
LOCATION DETAILED: LEFT PROXIMAL DORSAL FOREARM
LOCATION DETAILED: LEFT MEDIAL UPPER BACK
LOCATION DETAILED: RIGHT MID-UPPER BACK
LOCATION DETAILED: RIGHT SUPERIOR MEDIAL UPPER BACK
LOCATION DETAILED: LEFT SUPERIOR LATERAL MIDBACK
LOCATION DETAILED: RIGHT CENTRAL MALAR CHEEK
LOCATION DETAILED: LEFT INFERIOR CENTRAL MALAR CHEEK
LOCATION DETAILED: LEFT MEDIAL SUPERIOR CHEST
LOCATION DETAILED: LEFT ANTERIOR DISTAL THIGH

## 2021-04-12 ASSESSMENT — LOCATION SIMPLE DESCRIPTION DERM
LOCATION SIMPLE: LEFT UPPER BACK
LOCATION SIMPLE: RIGHT THIGH
LOCATION SIMPLE: RIGHT CHEEK
LOCATION SIMPLE: RIGHT UPPER ARM
LOCATION SIMPLE: RIGHT FOREARM
LOCATION SIMPLE: CHEST
LOCATION SIMPLE: LEFT THIGH
LOCATION SIMPLE: ABDOMEN
LOCATION SIMPLE: LEFT FOREARM
LOCATION SIMPLE: RIGHT UPPER BACK
LOCATION SIMPLE: LEFT POSTERIOR THIGH
LOCATION SIMPLE: LEFT UPPER ARM
LOCATION SIMPLE: LEFT POSTERIOR UPPER ARM
LOCATION SIMPLE: LEFT CHEEK
LOCATION SIMPLE: LEFT LOWER BACK
LOCATION SIMPLE: RIGHT POSTERIOR UPPER ARM
LOCATION SIMPLE: RIGHT POSTERIOR THIGH
LOCATION SIMPLE: RIGHT BUTTOCK

## 2021-04-12 ASSESSMENT — LOCATION ZONE DERM
LOCATION ZONE: LEG
LOCATION ZONE: ARM
LOCATION ZONE: FACE
LOCATION ZONE: TRUNK

## 2021-04-12 NOTE — PROCEDURE: OBSERVATION
Size Of Lesion: 2mm x 2mm
Detail Level: Detailed
Size Of Lesion: 4mm x 3mm
Size Of Lesion: 1mm x 5mm

## 2021-04-12 NOTE — PROCEDURE: DIAGNOSIS COMMENT
Detail Level: Simple
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
Render Risk Assessment In Note?: no
Comment: Bx proven Dysplastic

## 2021-04-12 NOTE — HPI: MOLE CHECK
What Is The Reason For Today's Visit?: Mole Check
Additional History: O/m Rt buttock 3mm x 5mm
Additional History: O/m Rt forearm 2mm x2mm
Additional History: O/m Rt thigh 4mm x 3mm

## 2021-04-29 ENCOUNTER — IMMUNIZATION (OUTPATIENT)
Dept: OTHER | Facility: MEDICAL CENTER | Age: 41
End: 2021-04-29
Payer: COMMERCIAL

## 2021-04-29 DIAGNOSIS — Z23 ENCOUNTER FOR VACCINATION: Primary | ICD-10-CM

## 2021-04-29 PROCEDURE — 91301 MODERNA SARS-COV-2 VACCINE: CPT

## 2021-04-29 PROCEDURE — 0011A MODERNA SARS-COV-2 VACCINE: CPT

## 2021-05-03 ENCOUNTER — HOSPITAL ENCOUNTER (OUTPATIENT)
Dept: LAB | Facility: MEDICAL CENTER | Age: 41
End: 2021-05-03
Attending: INTERNAL MEDICINE
Payer: COMMERCIAL

## 2021-05-03 LAB — FERRITIN SERPL-MCNC: 53.8 NG/ML (ref 10–291)

## 2021-05-03 PROCEDURE — 82728 ASSAY OF FERRITIN: CPT

## 2021-05-03 PROCEDURE — 82105 ALPHA-FETOPROTEIN SERUM: CPT

## 2021-05-05 LAB — AFP-TM SERPL-MCNC: 2 NG/ML (ref 0–9)

## 2021-05-25 ENCOUNTER — APPOINTMENT (OUTPATIENT)
Dept: RADIOLOGY | Facility: MEDICAL CENTER | Age: 41
End: 2021-05-25
Attending: INTERNAL MEDICINE
Payer: COMMERCIAL

## 2021-05-27 ENCOUNTER — IMMUNIZATION (OUTPATIENT)
Dept: OTHER | Facility: MEDICAL CENTER | Age: 41
End: 2021-05-27
Attending: INTERNAL MEDICINE
Payer: COMMERCIAL

## 2021-05-27 DIAGNOSIS — Z23 ENCOUNTER FOR VACCINATION: Primary | ICD-10-CM

## 2021-05-27 PROCEDURE — 91301 MODERNA SARS-COV-2 VACCINE: CPT

## 2021-05-27 PROCEDURE — 0012A MODERNA SARS-COV-2 VACCINE: CPT

## 2021-06-23 ENCOUNTER — HOSPITAL ENCOUNTER (OUTPATIENT)
Dept: RADIOLOGY | Facility: MEDICAL CENTER | Age: 41
End: 2021-06-23
Attending: INTERNAL MEDICINE
Payer: COMMERCIAL

## 2021-06-23 DIAGNOSIS — E83.110 HEREDITARY HEMOCHROMATOSIS (HCC): ICD-10-CM

## 2021-06-23 PROCEDURE — 76700 US EXAM ABDOM COMPLETE: CPT

## 2021-07-31 ENCOUNTER — HOSPITAL ENCOUNTER (OUTPATIENT)
Dept: LAB | Facility: MEDICAL CENTER | Age: 41
End: 2021-07-31
Attending: PHYSICIAN ASSISTANT
Payer: COMMERCIAL

## 2021-07-31 LAB
25(OH)D3 SERPL-MCNC: 25 NG/ML (ref 30–100)
ALBUMIN SERPL BCP-MCNC: 4.6 G/DL (ref 3.2–4.9)
ALBUMIN/GLOB SERPL: 1.6 G/DL
ALP SERPL-CCNC: 104 U/L (ref 30–99)
ALT SERPL-CCNC: 15 U/L (ref 2–50)
ANION GAP SERPL CALC-SCNC: 10 MMOL/L (ref 7–16)
AST SERPL-CCNC: 19 U/L (ref 12–45)
BILIRUB SERPL-MCNC: 0.6 MG/DL (ref 0.1–1.5)
BUN SERPL-MCNC: 13 MG/DL (ref 8–22)
CALCIUM SERPL-MCNC: 9.3 MG/DL (ref 8.5–10.5)
CHLORIDE SERPL-SCNC: 104 MMOL/L (ref 96–112)
CHOLEST SERPL-MCNC: 198 MG/DL (ref 100–199)
CO2 SERPL-SCNC: 24 MMOL/L (ref 20–33)
CREAT SERPL-MCNC: 0.67 MG/DL (ref 0.5–1.4)
GLOBULIN SER CALC-MCNC: 2.8 G/DL (ref 1.9–3.5)
GLUCOSE SERPL-MCNC: 91 MG/DL (ref 65–99)
HDLC SERPL-MCNC: 52 MG/DL
LDLC SERPL CALC-MCNC: 119 MG/DL
POTASSIUM SERPL-SCNC: 5 MMOL/L (ref 3.6–5.5)
PROT SERPL-MCNC: 7.4 G/DL (ref 6–8.2)
SODIUM SERPL-SCNC: 138 MMOL/L (ref 135–145)
TRIGL SERPL-MCNC: 136 MG/DL (ref 0–149)

## 2021-07-31 PROCEDURE — 82306 VITAMIN D 25 HYDROXY: CPT

## 2021-07-31 PROCEDURE — 36415 COLL VENOUS BLD VENIPUNCTURE: CPT

## 2021-07-31 PROCEDURE — 80061 LIPID PANEL: CPT

## 2021-07-31 PROCEDURE — 80053 COMPREHEN METABOLIC PANEL: CPT

## 2021-08-11 ENCOUNTER — HOSPITAL ENCOUNTER (OUTPATIENT)
Dept: RADIOLOGY | Facility: MEDICAL CENTER | Age: 41
End: 2021-08-11
Attending: PHYSICIAN ASSISTANT
Payer: COMMERCIAL

## 2021-08-11 DIAGNOSIS — M79.644 PAIN IN FINGER OF RIGHT HAND: ICD-10-CM

## 2021-08-11 PROCEDURE — 73200 CT UPPER EXTREMITY W/O DYE: CPT | Mod: RT

## 2021-09-28 ENCOUNTER — IMMUNIZATION (OUTPATIENT)
Dept: OCCUPATIONAL MEDICINE | Facility: CLINIC | Age: 41
End: 2021-09-28
Payer: COMMERCIAL

## 2021-09-28 DIAGNOSIS — Z23 NEED FOR VACCINATION: Primary | ICD-10-CM

## 2021-09-28 PROCEDURE — 90686 IIV4 VACC NO PRSV 0.5 ML IM: CPT | Performed by: PREVENTIVE MEDICINE

## 2021-11-20 ENCOUNTER — HOSPITAL ENCOUNTER (OUTPATIENT)
Dept: LAB | Facility: MEDICAL CENTER | Age: 41
End: 2021-11-20
Attending: PHYSICIAN ASSISTANT
Payer: COMMERCIAL

## 2021-11-20 LAB
ALBUMIN SERPL BCP-MCNC: 4.6 G/DL (ref 3.2–4.9)
ALBUMIN/GLOB SERPL: 1.9 G/DL
ALP SERPL-CCNC: 75 U/L (ref 30–99)
ALT SERPL-CCNC: 7 U/L (ref 2–50)
ANION GAP SERPL CALC-SCNC: 9 MMOL/L (ref 7–16)
AST SERPL-CCNC: 9 U/L (ref 12–45)
BASOPHILS # BLD AUTO: 0.6 % (ref 0–1.8)
BASOPHILS # BLD: 0.03 K/UL (ref 0–0.12)
BILIRUB SERPL-MCNC: 0.6 MG/DL (ref 0.1–1.5)
BUN SERPL-MCNC: 12 MG/DL (ref 8–22)
CALCIUM SERPL-MCNC: 9.2 MG/DL (ref 8.5–10.5)
CHLORIDE SERPL-SCNC: 107 MMOL/L (ref 96–112)
CHOLEST SERPL-MCNC: 178 MG/DL (ref 100–199)
CO2 SERPL-SCNC: 23 MMOL/L (ref 20–33)
CREAT SERPL-MCNC: 0.67 MG/DL (ref 0.5–1.4)
EOSINOPHIL # BLD AUTO: 0.13 K/UL (ref 0–0.51)
EOSINOPHIL NFR BLD: 2.4 % (ref 0–6.9)
ERYTHROCYTE [DISTWIDTH] IN BLOOD BY AUTOMATED COUNT: 41 FL (ref 35.9–50)
FASTING STATUS PATIENT QL REPORTED: NORMAL
GLOBULIN SER CALC-MCNC: 2.4 G/DL (ref 1.9–3.5)
GLUCOSE SERPL-MCNC: 93 MG/DL (ref 65–99)
HCT VFR BLD AUTO: 41.1 % (ref 37–47)
HDLC SERPL-MCNC: 43 MG/DL
HGB BLD-MCNC: 14 G/DL (ref 12–16)
IMM GRANULOCYTES # BLD AUTO: 0.02 K/UL (ref 0–0.11)
IMM GRANULOCYTES NFR BLD AUTO: 0.4 % (ref 0–0.9)
LDLC SERPL CALC-MCNC: 116 MG/DL
LYMPHOCYTES # BLD AUTO: 1.36 K/UL (ref 1–4.8)
LYMPHOCYTES NFR BLD: 25 % (ref 22–41)
MCH RBC QN AUTO: 32.1 PG (ref 27–33)
MCHC RBC AUTO-ENTMCNC: 34.1 G/DL (ref 33.6–35)
MCV RBC AUTO: 94.3 FL (ref 81.4–97.8)
MONOCYTES # BLD AUTO: 0.5 K/UL (ref 0–0.85)
MONOCYTES NFR BLD AUTO: 9.2 % (ref 0–13.4)
NEUTROPHILS # BLD AUTO: 3.41 K/UL (ref 2–7.15)
NEUTROPHILS NFR BLD: 62.4 % (ref 44–72)
NRBC # BLD AUTO: 0 K/UL
NRBC BLD-RTO: 0 /100 WBC
PLATELET # BLD AUTO: 210 K/UL (ref 164–446)
PMV BLD AUTO: 11 FL (ref 9–12.9)
POTASSIUM SERPL-SCNC: 4.1 MMOL/L (ref 3.6–5.5)
PROT SERPL-MCNC: 7 G/DL (ref 6–8.2)
RBC # BLD AUTO: 4.36 M/UL (ref 4.2–5.4)
SODIUM SERPL-SCNC: 139 MMOL/L (ref 135–145)
T3FREE SERPL-MCNC: 3.27 PG/ML (ref 2–4.4)
T4 FREE SERPL-MCNC: 1.41 NG/DL (ref 0.93–1.7)
TRIGL SERPL-MCNC: 93 MG/DL (ref 0–149)
TSH SERPL DL<=0.005 MIU/L-ACNC: 1.04 UIU/ML (ref 0.38–5.33)
WBC # BLD AUTO: 5.5 K/UL (ref 4.8–10.8)

## 2021-11-20 PROCEDURE — 80053 COMPREHEN METABOLIC PANEL: CPT

## 2021-11-20 PROCEDURE — 82306 VITAMIN D 25 HYDROXY: CPT

## 2021-11-20 PROCEDURE — 80061 LIPID PANEL: CPT

## 2021-11-20 PROCEDURE — 86800 THYROGLOBULIN ANTIBODY: CPT

## 2021-11-20 PROCEDURE — 36415 COLL VENOUS BLD VENIPUNCTURE: CPT

## 2021-11-20 PROCEDURE — 84439 ASSAY OF FREE THYROXINE: CPT

## 2021-11-20 PROCEDURE — 84443 ASSAY THYROID STIM HORMONE: CPT

## 2021-11-20 PROCEDURE — 85025 COMPLETE CBC W/AUTO DIFF WBC: CPT

## 2021-11-20 PROCEDURE — 84481 FREE ASSAY (FT-3): CPT

## 2021-11-22 LAB
25(OH)D3 SERPL-MCNC: 86 NG/ML (ref 30–80)
THYROGLOB AB SERPL-ACNC: <0.9 IU/ML (ref 0–4)

## 2021-11-30 ENCOUNTER — OFFICE VISIT (OUTPATIENT)
Dept: URGENT CARE | Facility: CLINIC | Age: 41
End: 2021-11-30
Payer: COMMERCIAL

## 2021-11-30 ENCOUNTER — HOSPITAL ENCOUNTER (OUTPATIENT)
Facility: MEDICAL CENTER | Age: 41
End: 2021-11-30
Attending: NURSE PRACTITIONER
Payer: COMMERCIAL

## 2021-11-30 VITALS
OXYGEN SATURATION: 96 % | BODY MASS INDEX: 23.36 KG/M2 | WEIGHT: 119 LBS | HEART RATE: 80 BPM | SYSTOLIC BLOOD PRESSURE: 102 MMHG | HEIGHT: 60 IN | DIASTOLIC BLOOD PRESSURE: 62 MMHG | RESPIRATION RATE: 16 BRPM | TEMPERATURE: 98 F

## 2021-11-30 DIAGNOSIS — Z20.822 CLOSE EXPOSURE TO COVID-19 VIRUS: ICD-10-CM

## 2021-11-30 DIAGNOSIS — Z20.822 SUSPECTED COVID-19 VIRUS INFECTION: ICD-10-CM

## 2021-11-30 DIAGNOSIS — Z20.822 SUSPECTED COVID-19 VIRUS INFECTION: Primary | ICD-10-CM

## 2021-11-30 DIAGNOSIS — J02.9 PHARYNGITIS, UNSPECIFIED ETIOLOGY: ICD-10-CM

## 2021-11-30 PROBLEM — M79.644 THUMB PAIN, RIGHT: Status: ACTIVE | Noted: 2018-08-10

## 2021-11-30 LAB
INT CON NEG: NEGATIVE
INT CON POS: POSITIVE
S PYO AG THROAT QL: NEGATIVE

## 2021-11-30 PROCEDURE — 99213 OFFICE O/P EST LOW 20 MIN: CPT | Mod: CS | Performed by: NURSE PRACTITIONER

## 2021-11-30 PROCEDURE — 87880 STREP A ASSAY W/OPTIC: CPT | Performed by: NURSE PRACTITIONER

## 2021-11-30 PROCEDURE — 0240U HCHG SARS-COV-2 COVID-19 NFCT DS RESP RNA 3 TRGT MIC: CPT

## 2021-11-30 RX ORDER — ERGOCALCIFEROL 1.25 MG/1
5000 CAPSULE ORAL
COMMUNITY

## 2021-11-30 RX ORDER — MELOXICAM 7.5 MG/1
15 TABLET ORAL PRN
COMMUNITY
End: 2023-05-20

## 2021-11-30 ASSESSMENT — ENCOUNTER SYMPTOMS
SORE THROAT: 1
MYALGIAS: 0
DIARRHEA: 1
HEADACHES: 1
CHILLS: 0
RHINORRHEA: 1
COUGH: 1
NAUSEA: 0
FEVER: 0
VOMITING: 0

## 2021-11-30 ASSESSMENT — FIBROSIS 4 INDEX: FIB4 SCORE: 0.65

## 2021-12-01 LAB
FLUAV RNA SPEC QL NAA+PROBE: NEGATIVE
FLUBV RNA SPEC QL NAA+PROBE: NEGATIVE
SARS-COV-2 RNA RESP QL NAA+PROBE: NOTDETECTED
SPECIMEN SOURCE: NORMAL

## 2021-12-01 NOTE — PROGRESS NOTES
Subjective:     Pauline Sharp is a 41 y.o. female who presents for Coronavirus Screening ( just tested positive for Covid today.  Sore throat, congestion, and diarrhea x 6 days)      URI   This is a new problem. The current episode started in the past 7 days (6 days ago Pauline developed URI/COVID symptoms). The problem has been unchanged. There has been no fever. Associated symptoms include congestion, coughing, diarrhea, headaches, rhinorrhea and a sore throat. Pertinent negatives include no ear pain, nausea or vomiting. She has tried nothing for the symptoms.   Her  did test positive for COVID today.       Review of Systems   Constitutional: Negative for chills, fever and malaise/fatigue.   HENT: Positive for congestion, rhinorrhea and sore throat. Negative for ear pain.    Respiratory: Positive for cough.    Gastrointestinal: Positive for diarrhea. Negative for nausea and vomiting.   Musculoskeletal: Negative for myalgias.   Neurological: Positive for headaches.       PMH:   Past Medical History:   Diagnosis Date   • Blood in stool 10/13/2014     ALLERGIES: No Known Allergies  SURGHX:   Past Surgical History:   Procedure Laterality Date   • PRIMARY C SECTION  2/10/2021    Procedure:  SECTION, PRIMARY;  Surgeon: La Lacey M.D.;  Location: SURGERY LABOR AND DELIVERY;  Service: Labor and Delivery   • FINGER EXPLORATION Right 2018    Procedure: FINGER EXPLORATION-  THUMB INTERPHALANGEAL JOINT AND BIOPSY;  Surgeon: Braxton Aggarwal M.D.;  Location: SURGERY SAME DAY [facility];  Service: Orthopedics   • [facility]       SOCHX:   Social History     Socioeconomic History   • Marital status:      Spouse name: Not on file   • Number of children: Not on file   • Years of education: Not on file   • Highest education level: Not on file   Occupational History   • Occupation: speech therapist   Tobacco Use   • Smoking status: Never Smoker   • Smokeless tobacco: Never Used   Vaping  Use   • Vaping Use: Never used   Substance and Sexual Activity   • Alcohol use: Yes     Alcohol/week: 0.5 oz     Types: 1 Glasses of wine per week     Comment: 1-2 per week   • Drug use: No   • Sexual activity: Yes     Partners: Male     Birth control/protection: Condom   Other Topics Concern   • Not on file   Social History Narrative   • Not on file     Social Determinants of Health     Financial Resource Strain:    • Difficulty of Paying Living Expenses: Not on file   Food Insecurity: No Food Insecurity   • Worried About Running Out of Food in the Last Year: Never true   • Ran Out of Food in the Last Year: Never true   Transportation Needs: No Transportation Needs   • Lack of Transportation (Medical): No   • Lack of Transportation (Non-Medical): No   Physical Activity:    • Days of Exercise per Week: Not on file   • Minutes of Exercise per Session: Not on file   Stress:    • Feeling of Stress : Not on file   Social Connections:    • Frequency of Communication with Friends and Family: Not on file   • Frequency of Social Gatherings with Friends and Family: Not on file   • Attends [employer] Services: Not on file   • Active Member of Clubs or Organizations: Not on file   • Attends Club or Organization Meetings: Not on file   • Marital Status: Not on file   Intimate Partner Violence:    • Fear of Current or Ex-Partner: Not on file   • Emotionally Abused: Not on file   • Physically Abused: Not on file   • Sexually Abused: Not on file   Housing Stability:    • Unable to Pay for Housing in the Last Year: Not on file   • Number of Places Lived in the Last Year: Not on file   • Unstable Housing in the Last Year: Not on file     FH:   Family History   Problem Relation Age of Onset   • Kidney Disease Father 65        no dialysis   • Heart Failure Maternal Grandmother    • Heart Disease Maternal Grandmother         chf   • Alzheimer's Disease Maternal Grandmother    • Thyroid Mother         hypothyroid   • Hypertension Mother     • Hyperlipidemia Mother    • No Known Problems Sister    • Alzheimer's Disease Paternal Grandmother    • Lung Cancer Paternal Grandfather    • Clotting Disorder Brother         DVT   • Hypertension Brother    • Hyperlipidemia Brother    • GI Disease Brother         bowel obstruction   • Diabetes Neg Hx          Objective:   /62 (BP Location: Right arm, Patient Position: Sitting, BP Cuff Size: Adult)   Pulse 80   Temp 36.7 °C (98 °F) (Temporal)   Resp 16   Ht 1.524 m (5')   Wt 54 kg (119 lb)   SpO2 96%   BMI 23.24 kg/m²     Physical Exam  Vitals and nursing note reviewed.   Constitutional:       General: She is not in acute distress.     Appearance: Normal appearance. She is normal weight. She is ill-appearing. She is not toxic-appearing.   HENT:      Head: Normocephalic and atraumatic.      Right Ear: Tympanic membrane, ear canal and external ear normal. There is impacted cerumen.      Left Ear: Tympanic membrane, ear canal and external ear normal. There is no impacted cerumen.      Ears:      Comments: Cerumen removed with curette of right canal by provider.  She tolerated this well.  Tympanic membrane visualized and clear following removal.     Nose: No congestion or rhinorrhea.      Mouth/Throat:      Mouth: Mucous membranes are moist.      Pharynx: Posterior oropharyngeal erythema present. No oropharyngeal exudate.   Eyes:      General:         Right eye: No discharge.         Left eye: No discharge.      Extraocular Movements: Extraocular movements intact.      Pupils: Pupils are equal, round, and reactive to light.   Cardiovascular:      Rate and Rhythm: Normal rate and regular rhythm.      Pulses: Normal pulses.      Heart sounds: Normal heart sounds.   Pulmonary:      Effort: Pulmonary effort is normal. No respiratory distress.      Breath sounds: Normal breath sounds. No stridor. No wheezing, rhonchi or rales.   Chest:      Chest wall: No tenderness.   Abdominal:      General: Abdomen is  flat. Bowel sounds are normal.      Palpations: Abdomen is soft.      Tenderness: There is no abdominal tenderness. There is no right CVA tenderness or left CVA tenderness.   Musculoskeletal:         General: Normal range of motion.      Cervical back: Normal range of motion and neck supple. No tenderness.   Lymphadenopathy:      Cervical: No cervical adenopathy.   Skin:     General: Skin is warm and dry.      Capillary Refill: Capillary refill takes less than 2 seconds.   Neurological:      General: No focal deficit present.      Mental Status: She is alert and oriented to person, place, and time. Mental status is at baseline.   Psychiatric:         Mood and Affect: Mood normal.         Behavior: Behavior normal.         Thought Content: Thought content normal.         Judgment: Judgment normal.       POCT strep: Negative  Assessment/Plan:   Assessment    1. Suspected COVID-19 virus infection  CoV-2 and Flu A/B by PCR (24 hour In-House): Collect NP swab in VTM   2. Pharyngitis, unspecified etiology  POCT Rapid Strep A   3. Close exposure to COVID-19 virus  CoV-2 and Flu A/B by PCR (24 hour In-House): Collect NP swab in VTM     Pt was tested for COVID today. I will call with results. Upon entering the room PPE was worn throughout the duration of his visit for both provider and patient. Mask of patient briefly removed for examination of oropharynx. PT was educated to remain in self isolation for at least 10 days from onset of symptoms followed by an additional 24-hour period of being symptom-free without the use of symptom altering medication.    We discussed supportive measures including humidifier, warm salt water gargles, over-the-counter Cepacol throat lozenges, rest  and increased fluids. Pt was encouraged to seek treatment back in the ER or urgent care for worsening symptoms,  fever greater than 100.5, wheezes or shortness of breath.    AVS handout given and reviewed with patient. Pt educated on red flags and  when to seek treatment back in ER or UC.

## 2021-12-02 ENCOUNTER — HOSPITAL ENCOUNTER (OUTPATIENT)
Facility: MEDICAL CENTER | Age: 41
End: 2021-12-02
Attending: NURSE PRACTITIONER
Payer: COMMERCIAL

## 2021-12-02 ENCOUNTER — NON-PROVIDER VISIT (OUTPATIENT)
Dept: URGENT CARE | Facility: PHYSICIAN GROUP | Age: 41
End: 2021-12-02
Payer: COMMERCIAL

## 2021-12-02 DIAGNOSIS — Z20.822 CLOSE EXPOSURE TO COVID-19 VIRUS: ICD-10-CM

## 2021-12-02 DIAGNOSIS — Z20.822 SUSPECTED COVID-19 VIRUS INFECTION: ICD-10-CM

## 2021-12-02 PROCEDURE — U0003 INFECTIOUS AGENT DETECTION BY NUCLEIC ACID (DNA OR RNA); SEVERE ACUTE RESPIRATORY SYNDROME CORONAVIRUS 2 (SARS-COV-2) (CORONAVIRUS DISEASE [COVID-19]), AMPLIFIED PROBE TECHNIQUE, MAKING USE OF HIGH THROUGHPUT TECHNOLOGIES AS DESCRIBED BY CMS-2020-01-R: HCPCS

## 2021-12-02 PROCEDURE — U0005 INFEC AGEN DETEC AMPLI PROBE: HCPCS

## 2021-12-03 DIAGNOSIS — Z20.822 SUSPECTED COVID-19 VIRUS INFECTION: ICD-10-CM

## 2021-12-03 DIAGNOSIS — Z20.822 CLOSE EXPOSURE TO COVID-19 VIRUS: ICD-10-CM

## 2021-12-03 LAB
COVID ORDER STATUS COVID19: NORMAL
SARS-COV-2 RNA RESP QL NAA+PROBE: NOTDETECTED
SPECIMEN SOURCE: NORMAL

## 2021-12-06 LAB
AMBIGUOUS DTTM AMBI4: NORMAL
SIGNIFICANT IND 70042: NORMAL
SITE SITE: NORMAL
SOURCE SOURCE: NORMAL

## 2021-12-17 ENCOUNTER — HOSPITAL ENCOUNTER (OUTPATIENT)
Dept: RADIOLOGY | Facility: MEDICAL CENTER | Age: 41
End: 2021-12-17
Attending: FAMILY MEDICINE
Payer: COMMERCIAL

## 2021-12-17 DIAGNOSIS — Z12.31 VISIT FOR SCREENING MAMMOGRAM: ICD-10-CM

## 2021-12-17 PROCEDURE — 77063 BREAST TOMOSYNTHESIS BI: CPT

## 2021-12-28 ENCOUNTER — HOSPITAL ENCOUNTER (OUTPATIENT)
Dept: RADIOLOGY | Facility: MEDICAL CENTER | Age: 41
End: 2021-12-28
Attending: PHYSICIAN ASSISTANT
Payer: COMMERCIAL

## 2022-01-06 ENCOUNTER — HOSPITAL ENCOUNTER (OUTPATIENT)
Dept: RADIOLOGY | Facility: MEDICAL CENTER | Age: 42
End: 2022-01-06
Attending: PHYSICIAN ASSISTANT
Payer: COMMERCIAL

## 2022-01-06 DIAGNOSIS — R92.30 BREAST DENSITY: ICD-10-CM

## 2022-01-06 PROCEDURE — 76641 ULTRASOUND BREAST COMPLETE: CPT

## 2022-01-10 ENCOUNTER — APPOINTMENT (OUTPATIENT)
Dept: RADIOLOGY | Facility: MEDICAL CENTER | Age: 42
End: 2022-01-10
Attending: PHYSICIAN ASSISTANT

## 2022-02-14 ENCOUNTER — APPOINTMENT (RX ONLY)
Dept: URBAN - METROPOLITAN AREA CLINIC 20 | Facility: CLINIC | Age: 42
Setting detail: DERMATOLOGY
End: 2022-02-14

## 2022-02-14 DIAGNOSIS — L82.1 OTHER SEBORRHEIC KERATOSIS: ICD-10-CM

## 2022-02-14 DIAGNOSIS — L57.8 OTHER SKIN CHANGES DUE TO CHRONIC EXPOSURE TO NONIONIZING RADIATION: ICD-10-CM

## 2022-02-14 DIAGNOSIS — B07.8 OTHER VIRAL WARTS: ICD-10-CM

## 2022-02-14 DIAGNOSIS — L81.4 OTHER MELANIN HYPERPIGMENTATION: ICD-10-CM

## 2022-02-14 DIAGNOSIS — D18.0 HEMANGIOMA: ICD-10-CM

## 2022-02-14 DIAGNOSIS — Z87.2 PERSONAL HISTORY OF DISEASES OF THE SKIN AND SUBCUTANEOUS TISSUE: ICD-10-CM

## 2022-02-14 DIAGNOSIS — D22 MELANOCYTIC NEVI: ICD-10-CM

## 2022-02-14 PROBLEM — D22.72 MELANOCYTIC NEVI OF LEFT LOWER LIMB, INCLUDING HIP: Status: ACTIVE | Noted: 2022-02-14

## 2022-02-14 PROBLEM — D48.5 NEOPLASM OF UNCERTAIN BEHAVIOR OF SKIN: Status: ACTIVE | Noted: 2022-02-14

## 2022-02-14 PROBLEM — D22.5 MELANOCYTIC NEVI OF TRUNK: Status: ACTIVE | Noted: 2022-02-14

## 2022-02-14 PROBLEM — D22.61 MELANOCYTIC NEVI OF RIGHT UPPER LIMB, INCLUDING SHOULDER: Status: ACTIVE | Noted: 2022-02-14

## 2022-02-14 PROBLEM — D22.62 MELANOCYTIC NEVI OF LEFT UPPER LIMB, INCLUDING SHOULDER: Status: ACTIVE | Noted: 2022-02-14

## 2022-02-14 PROBLEM — D18.01 HEMANGIOMA OF SKIN AND SUBCUTANEOUS TISSUE: Status: ACTIVE | Noted: 2022-02-14

## 2022-02-14 PROBLEM — D22.71 MELANOCYTIC NEVI OF RIGHT LOWER LIMB, INCLUDING HIP: Status: ACTIVE | Noted: 2022-02-14

## 2022-02-14 PROCEDURE — ? LIQUID NITROGEN

## 2022-02-14 PROCEDURE — 99213 OFFICE O/P EST LOW 20 MIN: CPT | Mod: 25

## 2022-02-14 PROCEDURE — ? DIAGNOSIS COMMENT

## 2022-02-14 PROCEDURE — ? OBSERVATION AND MEASURE

## 2022-02-14 PROCEDURE — ? BIOPSY BY SHAVE METHOD

## 2022-02-14 PROCEDURE — 11103 TANGNTL BX SKIN EA SEP/ADDL: CPT | Mod: 59

## 2022-02-14 PROCEDURE — 17110 DESTRUCTION B9 LES UP TO 14: CPT

## 2022-02-14 PROCEDURE — 11102 TANGNTL BX SKIN SINGLE LES: CPT | Mod: 59

## 2022-02-14 PROCEDURE — ? COUNSELING

## 2022-02-14 ASSESSMENT — LOCATION ZONE DERM
LOCATION ZONE: TRUNK
LOCATION ZONE: ARM
LOCATION ZONE: FACE
LOCATION ZONE: LEG
LOCATION ZONE: FEET

## 2022-02-14 ASSESSMENT — LOCATION SIMPLE DESCRIPTION DERM
LOCATION SIMPLE: LEFT CHEEK
LOCATION SIMPLE: LEFT POSTERIOR THIGH
LOCATION SIMPLE: LEFT CALF
LOCATION SIMPLE: RIGHT UPPER BACK
LOCATION SIMPLE: LEFT THIGH
LOCATION SIMPLE: CHEST
LOCATION SIMPLE: RIGHT POSTERIOR UPPER ARM
LOCATION SIMPLE: LEFT UPPER ARM
LOCATION SIMPLE: RIGHT POSTERIOR THIGH
LOCATION SIMPLE: ABDOMEN
LOCATION SIMPLE: RIGHT UPPER ARM
LOCATION SIMPLE: LEFT UPPER BACK
LOCATION SIMPLE: LEFT FOREARM
LOCATION SIMPLE: RIGHT FOREARM
LOCATION SIMPLE: LEFT POSTERIOR UPPER ARM
LOCATION SIMPLE: RIGHT BUTTOCK
LOCATION SIMPLE: RIGHT THIGH
LOCATION SIMPLE: RIGHT CHEEK
LOCATION SIMPLE: RIGHT PLANTAR SURFACE
LOCATION SIMPLE: LEFT LOWER BACK

## 2022-02-14 ASSESSMENT — LOCATION DETAILED DESCRIPTION DERM
LOCATION DETAILED: RIGHT PROXIMAL DORSAL FOREARM
LOCATION DETAILED: RIGHT DISTAL DORSAL FOREARM
LOCATION DETAILED: LEFT SUPERIOR LATERAL MIDBACK
LOCATION DETAILED: RIGHT BUTTOCK
LOCATION DETAILED: PERIUMBILICAL SKIN
LOCATION DETAILED: LEFT DISTAL POSTERIOR UPPER ARM
LOCATION DETAILED: RIGHT DISTAL POSTERIOR UPPER ARM
LOCATION DETAILED: RIGHT INSTEP
LOCATION DETAILED: LEFT ANTERIOR PROXIMAL UPPER ARM
LOCATION DETAILED: LEFT PROXIMAL CALF
LOCATION DETAILED: RIGHT INFERIOR UPPER BACK
LOCATION DETAILED: RIGHT SUPERIOR MEDIAL UPPER BACK
LOCATION DETAILED: RIGHT CENTRAL MALAR CHEEK
LOCATION DETAILED: RIGHT DISTAL POSTERIOR THIGH
LOCATION DETAILED: RIGHT LATERAL BUTTOCK
LOCATION DETAILED: RIGHT ANTERIOR LATERAL PROXIMAL THIGH
LOCATION DETAILED: LEFT INFERIOR CENTRAL MALAR CHEEK
LOCATION DETAILED: RIGHT ANTERIOR PROXIMAL UPPER ARM
LOCATION DETAILED: LEFT PROXIMAL DORSAL FOREARM
LOCATION DETAILED: RIGHT MID-UPPER BACK
LOCATION DETAILED: LEFT MEDIAL UPPER BACK
LOCATION DETAILED: RIGHT ANTERIOR DISTAL THIGH
LOCATION DETAILED: LEFT MEDIAL SUPERIOR CHEST
LOCATION DETAILED: LEFT DISTAL POSTERIOR THIGH
LOCATION DETAILED: LEFT ANTERIOR DISTAL THIGH

## 2022-02-14 NOTE — PROCEDURE: OBSERVATION
Detail Level: Detailed
Size Of Lesion: 1mm x 5mm
Size Of Lesion: 2mm x 2mm
Size Of Lesion: 4mm x 3mm

## 2022-02-14 NOTE — PROCEDURE: LIQUID NITROGEN
Show Aperture Variable?: Yes
Include Z78.9 (Other Specified Conditions Influencing Health Status) As An Associated Diagnosis?: No
Post-Care Instructions: I reviewed with the patient in detail post-care instructions. Patient is to wear sunprotection, and avoid picking at any of the treated lesions. Pt may apply Vaseline to crusted or scabbing areas.
Medical Necessity Clause: This procedure was medically necessary because the lesions that were treated were:
Medical Necessity Information: It is in your best interest to select a reason for this procedure from the list below. All of these items fulfill various CMS LCD requirements except the new and changing color options.
Detail Level: Detailed
Consent: The patient's consent was obtained including but not limited to risks of crusting, scabbing, blistering, scarring, darker or lighter pigmentary change, recurrence, incomplete removal and infection.
Spray Paint Text: The liquid nitrogen was applied to the skin utilizing a spray paint frosting technique.

## 2022-02-14 NOTE — HPI: MOLE CHECK
What Is The Reason For Today's Visit?: Mole Check
Additional History: Rt buttock 3mm x 5mm
Additional History: Rt dis dorsal forearm 2mm x2mm
Additional History: Rt ant dis thigh 4mm x 3mm

## 2022-03-02 ENCOUNTER — HOSPITAL ENCOUNTER (OUTPATIENT)
Dept: LAB | Facility: MEDICAL CENTER | Age: 42
End: 2022-03-02
Attending: INTERNAL MEDICINE
Payer: COMMERCIAL

## 2022-03-02 LAB
BASOPHILS # BLD AUTO: 0.7 % (ref 0–1.8)
BASOPHILS # BLD: 0.06 K/UL (ref 0–0.12)
EOSINOPHIL # BLD AUTO: 0.13 K/UL (ref 0–0.51)
EOSINOPHIL NFR BLD: 1.5 % (ref 0–6.9)
ERYTHROCYTE [DISTWIDTH] IN BLOOD BY AUTOMATED COUNT: 42.3 FL (ref 35.9–50)
FERRITIN SERPL-MCNC: 45 NG/ML (ref 10–291)
HCT VFR BLD AUTO: 43.2 % (ref 37–47)
HGB BLD-MCNC: 14.7 G/DL (ref 12–16)
IMM GRANULOCYTES # BLD AUTO: 0.03 K/UL (ref 0–0.11)
IMM GRANULOCYTES NFR BLD AUTO: 0.4 % (ref 0–0.9)
LYMPHOCYTES # BLD AUTO: 2.05 K/UL (ref 1–4.8)
LYMPHOCYTES NFR BLD: 24.1 % (ref 22–41)
MCH RBC QN AUTO: 31.9 PG (ref 27–33)
MCHC RBC AUTO-ENTMCNC: 34 G/DL (ref 33.6–35)
MCV RBC AUTO: 93.7 FL (ref 81.4–97.8)
MONOCYTES # BLD AUTO: 0.69 K/UL (ref 0–0.85)
MONOCYTES NFR BLD AUTO: 8.1 % (ref 0–13.4)
NEUTROPHILS # BLD AUTO: 5.53 K/UL (ref 2–7.15)
NEUTROPHILS NFR BLD: 65.2 % (ref 44–72)
NRBC # BLD AUTO: 0 K/UL
NRBC BLD-RTO: 0 /100 WBC
PLATELET # BLD AUTO: 233 K/UL (ref 164–446)
PMV BLD AUTO: 10.6 FL (ref 9–12.9)
RBC # BLD AUTO: 4.61 M/UL (ref 4.2–5.4)
WBC # BLD AUTO: 8.5 K/UL (ref 4.8–10.8)

## 2022-03-02 PROCEDURE — 36415 COLL VENOUS BLD VENIPUNCTURE: CPT

## 2022-03-02 PROCEDURE — 82728 ASSAY OF FERRITIN: CPT

## 2022-03-02 PROCEDURE — 85025 COMPLETE CBC W/AUTO DIFF WBC: CPT

## 2022-05-09 ENCOUNTER — HOSPITAL ENCOUNTER (OUTPATIENT)
Facility: MEDICAL CENTER | Age: 42
End: 2022-05-09
Attending: OBSTETRICS & GYNECOLOGY
Payer: COMMERCIAL

## 2022-05-09 PROCEDURE — 84702 CHORIONIC GONADOTROPIN TEST: CPT

## 2022-05-10 LAB — B-HCG SERPL-ACNC: 5819 MIU/ML (ref 0–5)

## 2022-05-11 ENCOUNTER — HOSPITAL ENCOUNTER (OUTPATIENT)
Facility: MEDICAL CENTER | Age: 42
End: 2022-05-11
Attending: OBSTETRICS & GYNECOLOGY
Payer: COMMERCIAL

## 2022-05-11 PROCEDURE — 84702 CHORIONIC GONADOTROPIN TEST: CPT

## 2022-05-12 LAB — B-HCG SERPL-ACNC: ABNORMAL MIU/ML (ref 0–5)

## 2022-05-14 ENCOUNTER — HOSPITAL ENCOUNTER (OUTPATIENT)
Dept: LAB | Facility: MEDICAL CENTER | Age: 42
End: 2022-05-14
Attending: PHYSICIAN ASSISTANT
Payer: COMMERCIAL

## 2022-05-14 LAB
ALBUMIN SERPL BCP-MCNC: 4.1 G/DL (ref 3.2–4.9)
ALBUMIN/GLOB SERPL: 1.5 G/DL
ALP SERPL-CCNC: 70 U/L (ref 30–99)
ALT SERPL-CCNC: 9 U/L (ref 2–50)
ANION GAP SERPL CALC-SCNC: 11 MMOL/L (ref 7–16)
AST SERPL-CCNC: 10 U/L (ref 12–45)
BASOPHILS # BLD AUTO: 0.6 % (ref 0–1.8)
BASOPHILS # BLD: 0.04 K/UL (ref 0–0.12)
BILIRUB SERPL-MCNC: 0.5 MG/DL (ref 0.1–1.5)
BUN SERPL-MCNC: 8 MG/DL (ref 8–22)
CALCIUM SERPL-MCNC: 9.2 MG/DL (ref 8.5–10.5)
CHLORIDE SERPL-SCNC: 105 MMOL/L (ref 96–112)
CHOLEST SERPL-MCNC: 141 MG/DL (ref 100–199)
CO2 SERPL-SCNC: 21 MMOL/L (ref 20–33)
CREAT SERPL-MCNC: 0.64 MG/DL (ref 0.5–1.4)
EOSINOPHIL # BLD AUTO: 0.1 K/UL (ref 0–0.51)
EOSINOPHIL NFR BLD: 1.4 % (ref 0–6.9)
ERYTHROCYTE [DISTWIDTH] IN BLOOD BY AUTOMATED COUNT: 44.8 FL (ref 35.9–50)
FASTING STATUS PATIENT QL REPORTED: NORMAL
FERRITIN SERPL-MCNC: 36.4 NG/ML (ref 10–291)
GFR SERPLBLD CREATININE-BSD FMLA CKD-EPI: 114 ML/MIN/1.73 M 2
GLOBULIN SER CALC-MCNC: 2.8 G/DL (ref 1.9–3.5)
GLUCOSE SERPL-MCNC: 85 MG/DL (ref 65–99)
HCT VFR BLD AUTO: 40.5 % (ref 37–47)
HDLC SERPL-MCNC: 49 MG/DL
HGB BLD-MCNC: 13.6 G/DL (ref 12–16)
IMM GRANULOCYTES # BLD AUTO: 0.03 K/UL (ref 0–0.11)
IMM GRANULOCYTES NFR BLD AUTO: 0.4 % (ref 0–0.9)
IRON SATN MFR SERPL: 47 % (ref 15–55)
IRON SERPL-MCNC: 125 UG/DL (ref 40–170)
LDLC SERPL CALC-MCNC: 78 MG/DL
LYMPHOCYTES # BLD AUTO: 1.51 K/UL (ref 1–4.8)
LYMPHOCYTES NFR BLD: 21.1 % (ref 22–41)
MCH RBC QN AUTO: 32.4 PG (ref 27–33)
MCHC RBC AUTO-ENTMCNC: 33.6 G/DL (ref 33.6–35)
MCV RBC AUTO: 96.4 FL (ref 81.4–97.8)
MONOCYTES # BLD AUTO: 0.63 K/UL (ref 0–0.85)
MONOCYTES NFR BLD AUTO: 8.8 % (ref 0–13.4)
NEUTROPHILS # BLD AUTO: 4.85 K/UL (ref 2–7.15)
NEUTROPHILS NFR BLD: 67.7 % (ref 44–72)
NRBC # BLD AUTO: 0 K/UL
NRBC BLD-RTO: 0 /100 WBC
PLATELET # BLD AUTO: 245 K/UL (ref 164–446)
PMV BLD AUTO: 10.8 FL (ref 9–12.9)
POTASSIUM SERPL-SCNC: 5 MMOL/L (ref 3.6–5.5)
PROT SERPL-MCNC: 6.9 G/DL (ref 6–8.2)
RBC # BLD AUTO: 4.2 M/UL (ref 4.2–5.4)
SODIUM SERPL-SCNC: 137 MMOL/L (ref 135–145)
T3FREE SERPL-MCNC: 3.48 PG/ML (ref 2–4.4)
T4 FREE SERPL-MCNC: 1.43 NG/DL (ref 0.93–1.7)
TIBC SERPL-MCNC: 268 UG/DL (ref 250–450)
TRANSFERRIN SERPL-MCNC: 212 MG/DL (ref 200–370)
TRIGL SERPL-MCNC: 71 MG/DL (ref 0–149)
TSH SERPL DL<=0.005 MIU/L-ACNC: 1.19 UIU/ML (ref 0.38–5.33)
UIBC SERPL-MCNC: 143 UG/DL (ref 110–370)
WBC # BLD AUTO: 7.2 K/UL (ref 4.8–10.8)

## 2022-05-14 PROCEDURE — 84443 ASSAY THYROID STIM HORMONE: CPT

## 2022-05-14 PROCEDURE — 80053 COMPREHEN METABOLIC PANEL: CPT

## 2022-05-14 PROCEDURE — 83550 IRON BINDING TEST: CPT

## 2022-05-14 PROCEDURE — 84439 ASSAY OF FREE THYROXINE: CPT

## 2022-05-14 PROCEDURE — 82728 ASSAY OF FERRITIN: CPT

## 2022-05-14 PROCEDURE — 82306 VITAMIN D 25 HYDROXY: CPT

## 2022-05-14 PROCEDURE — 83540 ASSAY OF IRON: CPT

## 2022-05-14 PROCEDURE — 84481 FREE ASSAY (FT-3): CPT

## 2022-05-14 PROCEDURE — 80061 LIPID PANEL: CPT

## 2022-05-14 PROCEDURE — 36415 COLL VENOUS BLD VENIPUNCTURE: CPT

## 2022-05-14 PROCEDURE — 85025 COMPLETE CBC W/AUTO DIFF WBC: CPT

## 2022-05-14 PROCEDURE — 84466 ASSAY OF TRANSFERRIN: CPT

## 2022-05-18 LAB — 25(OH)D3 SERPL-MCNC: 39 NG/ML (ref 30–100)

## 2022-07-12 ENCOUNTER — HOSPITAL ENCOUNTER (OUTPATIENT)
Facility: MEDICAL CENTER | Age: 42
End: 2022-07-12
Attending: OBSTETRICS & GYNECOLOGY
Payer: COMMERCIAL

## 2022-07-12 PROCEDURE — 82951 GLUCOSE TOLERANCE TEST (GTT): CPT

## 2022-07-12 PROCEDURE — 82952 GTT-ADDED SAMPLES: CPT

## 2022-07-13 LAB
GLUCOSE 1H P CHAL SERPL-MCNC: 148 MG/DL (ref 65–180)
GLUCOSE 2H P CHAL SERPL-MCNC: 115 MG/DL (ref 65–155)
GLUCOSE 3H P CHAL SERPL-MCNC: 93 MG/DL (ref 65–140)
GLUCOSE BS SERPL-MCNC: 72 MG/DL (ref 65–95)

## 2022-07-28 ENCOUNTER — HOSPITAL ENCOUNTER (OUTPATIENT)
Facility: MEDICAL CENTER | Age: 42
End: 2022-07-28
Attending: OBSTETRICS & GYNECOLOGY
Payer: COMMERCIAL

## 2022-07-28 PROCEDURE — 82105 ALPHA-FETOPROTEIN SERUM: CPT

## 2022-07-31 LAB
# FETUSES US: NORMAL
AFP MOM SERPL: 0.71
AFP SERPL-MCNC: 31 NG/ML
AGE - REPORTED: 41.5 YR
CURRENT SMOKER: NORMAL
FAMILY MEMBER DISEASES HX: NO
GA METHOD: NORMAL
GA: NORMAL WK
IDDM PATIENT QL: NO
INTEGRATED SCN PATIENT-IMP: NORMAL
SPECIMEN DRAWN SERPL: NORMAL

## 2022-08-15 ENCOUNTER — APPOINTMENT (RX ONLY)
Dept: URBAN - METROPOLITAN AREA CLINIC 20 | Facility: CLINIC | Age: 42
Setting detail: DERMATOLOGY
End: 2022-08-15

## 2022-08-15 DIAGNOSIS — Z87.2 PERSONAL HISTORY OF DISEASES OF THE SKIN AND SUBCUTANEOUS TISSUE: ICD-10-CM

## 2022-08-15 DIAGNOSIS — L82.1 OTHER SEBORRHEIC KERATOSIS: ICD-10-CM

## 2022-08-15 DIAGNOSIS — L81.4 OTHER MELANIN HYPERPIGMENTATION: ICD-10-CM

## 2022-08-15 DIAGNOSIS — D22 MELANOCYTIC NEVI: ICD-10-CM | Status: STABLE

## 2022-08-15 DIAGNOSIS — D18.0 HEMANGIOMA: ICD-10-CM

## 2022-08-15 DIAGNOSIS — L57.8 OTHER SKIN CHANGES DUE TO CHRONIC EXPOSURE TO NONIONIZING RADIATION: ICD-10-CM

## 2022-08-15 PROBLEM — D22.61 MELANOCYTIC NEVI OF RIGHT UPPER LIMB, INCLUDING SHOULDER: Status: ACTIVE | Noted: 2022-08-15

## 2022-08-15 PROBLEM — D22.5 MELANOCYTIC NEVI OF TRUNK: Status: ACTIVE | Noted: 2022-08-15

## 2022-08-15 PROBLEM — D22.71 MELANOCYTIC NEVI OF RIGHT LOWER LIMB, INCLUDING HIP: Status: ACTIVE | Noted: 2022-08-15

## 2022-08-15 PROBLEM — D22.62 MELANOCYTIC NEVI OF LEFT UPPER LIMB, INCLUDING SHOULDER: Status: ACTIVE | Noted: 2022-08-15

## 2022-08-15 PROBLEM — D18.01 HEMANGIOMA OF SKIN AND SUBCUTANEOUS TISSUE: Status: ACTIVE | Noted: 2022-08-15

## 2022-08-15 PROBLEM — D22.72 MELANOCYTIC NEVI OF LEFT LOWER LIMB, INCLUDING HIP: Status: ACTIVE | Noted: 2022-08-15

## 2022-08-15 PROCEDURE — ? DIAGNOSIS COMMENT

## 2022-08-15 PROCEDURE — ? COUNSELING

## 2022-08-15 PROCEDURE — ? OBSERVATION AND MEASURE

## 2022-08-15 PROCEDURE — 99213 OFFICE O/P EST LOW 20 MIN: CPT

## 2022-08-15 ASSESSMENT — LOCATION SIMPLE DESCRIPTION DERM
LOCATION SIMPLE: LEFT POSTERIOR UPPER ARM
LOCATION SIMPLE: ABDOMEN
LOCATION SIMPLE: LEFT POSTERIOR THIGH
LOCATION SIMPLE: LEFT LOWER BACK
LOCATION SIMPLE: RIGHT POSTERIOR THIGH
LOCATION SIMPLE: LEFT CHEEK
LOCATION SIMPLE: RIGHT UPPER ARM
LOCATION SIMPLE: RIGHT HAND
LOCATION SIMPLE: RIGHT FOREARM
LOCATION SIMPLE: CHEST
LOCATION SIMPLE: RIGHT BUTTOCK
LOCATION SIMPLE: RIGHT UPPER BACK
LOCATION SIMPLE: LEFT THIGH
LOCATION SIMPLE: LEFT UPPER ARM
LOCATION SIMPLE: RIGHT CHEEK
LOCATION SIMPLE: RIGHT THIGH
LOCATION SIMPLE: LEFT FOREARM
LOCATION SIMPLE: LEFT UPPER BACK
LOCATION SIMPLE: RIGHT POSTERIOR UPPER ARM

## 2022-08-15 ASSESSMENT — LOCATION DETAILED DESCRIPTION DERM
LOCATION DETAILED: RIGHT DISTAL DORSAL FOREARM
LOCATION DETAILED: RIGHT DISTAL POSTERIOR UPPER ARM
LOCATION DETAILED: LEFT DISTAL POSTERIOR UPPER ARM
LOCATION DETAILED: RIGHT CENTRAL MALAR CHEEK
LOCATION DETAILED: LEFT MEDIAL SUPERIOR CHEST
LOCATION DETAILED: LEFT SUPERIOR LATERAL MIDBACK
LOCATION DETAILED: RIGHT ANTERIOR PROXIMAL UPPER ARM
LOCATION DETAILED: RIGHT SUPERIOR MEDIAL UPPER BACK
LOCATION DETAILED: RIGHT BUTTOCK
LOCATION DETAILED: LEFT ANTERIOR PROXIMAL UPPER ARM
LOCATION DETAILED: RIGHT ANTERIOR DISTAL THIGH
LOCATION DETAILED: RIGHT LATERAL BUTTOCK
LOCATION DETAILED: RIGHT PROXIMAL DORSAL FOREARM
LOCATION DETAILED: LEFT ANTERIOR DISTAL THIGH
LOCATION DETAILED: PERIUMBILICAL SKIN
LOCATION DETAILED: LEFT MEDIAL UPPER BACK
LOCATION DETAILED: LEFT INFERIOR CENTRAL MALAR CHEEK
LOCATION DETAILED: RIGHT MID-UPPER BACK
LOCATION DETAILED: RIGHT RADIAL DORSAL HAND
LOCATION DETAILED: LEFT PROXIMAL DORSAL FOREARM
LOCATION DETAILED: RIGHT INFERIOR UPPER BACK
LOCATION DETAILED: LEFT DISTAL POSTERIOR THIGH
LOCATION DETAILED: RIGHT DISTAL POSTERIOR THIGH

## 2022-08-15 ASSESSMENT — LOCATION ZONE DERM
LOCATION ZONE: HAND
LOCATION ZONE: LEG
LOCATION ZONE: FACE
LOCATION ZONE: ARM
LOCATION ZONE: TRUNK

## 2022-08-15 NOTE — HPI: MOLE CHECK
What Is The Reason For Today's Visit?: Mole Check
Additional History: Size: 2mm x 2mm - right distal dorsal forearm \\n\\n Size: 4mm x 3mm - right anterior distal thigh\\n\\nSize: 1mm x 1mm - right buttock

## 2022-09-29 PROCEDURE — RXMED WILLOW AMBULATORY MEDICATION CHARGE: Performed by: OBSTETRICS & GYNECOLOGY

## 2022-09-30 ENCOUNTER — IMMUNIZATION (OUTPATIENT)
Dept: OCCUPATIONAL MEDICINE | Facility: CLINIC | Age: 42
End: 2022-09-30

## 2022-09-30 DIAGNOSIS — Z23 NEED FOR VACCINATION: Primary | ICD-10-CM

## 2022-09-30 PROCEDURE — 90686 IIV4 VACC NO PRSV 0.5 ML IM: CPT | Performed by: PREVENTIVE MEDICINE

## 2022-10-03 ENCOUNTER — PHARMACY VISIT (OUTPATIENT)
Dept: PHARMACY | Facility: MEDICAL CENTER | Age: 42
End: 2022-10-03
Payer: COMMERCIAL

## 2022-10-07 ENCOUNTER — HOSPITAL ENCOUNTER (OUTPATIENT)
Dept: LAB | Facility: MEDICAL CENTER | Age: 42
End: 2022-10-07
Attending: OBSTETRICS & GYNECOLOGY

## 2022-10-07 ENCOUNTER — HOSPITAL ENCOUNTER (OUTPATIENT)
Facility: MEDICAL CENTER | Age: 42
End: 2022-10-07
Attending: OBSTETRICS & GYNECOLOGY
Payer: COMMERCIAL

## 2022-10-07 LAB
GLUCOSE 1H P CHAL SERPL-MCNC: 148 MG/DL (ref 65–199)
GLUCOSE 2H P CHAL SERPL-MCNC: 119 MG/DL (ref 65–139)
GLUCOSE 3H P CHAL SERPL-MCNC: 88 MG/DL (ref 65–109)
GLUCOSE BS SERPL-MCNC: 71 MG/DL (ref 65–99)

## 2022-10-07 PROCEDURE — 82951 GLUCOSE TOLERANCE TEST (GTT): CPT

## 2022-10-07 PROCEDURE — 82952 GTT-ADDED SAMPLES: CPT

## 2022-10-29 NOTE — PROGRESS NOTES
0800 Assessment completed, fundus firm, lochia light. ABD incision with dressing intact, old drainage present. POC reviewed, verbalized understanding. Denies pain at this time, will call if pain med intervention needed.    Speaking Coherently

## 2022-11-08 ENCOUNTER — HOSPITAL ENCOUNTER (OUTPATIENT)
Dept: LAB | Facility: MEDICAL CENTER | Age: 42
End: 2022-11-08
Attending: PHYSICIAN ASSISTANT
Payer: COMMERCIAL

## 2022-11-08 LAB
25(OH)D3 SERPL-MCNC: 34 NG/ML (ref 30–100)
ALBUMIN SERPL BCP-MCNC: 3.3 G/DL (ref 3.2–4.9)
ALBUMIN/GLOB SERPL: 1.1 G/DL
ALP SERPL-CCNC: 124 U/L (ref 30–99)
ALT SERPL-CCNC: 13 U/L (ref 2–50)
ANION GAP SERPL CALC-SCNC: 12 MMOL/L (ref 7–16)
AST SERPL-CCNC: 14 U/L (ref 12–45)
BASOPHILS # BLD AUTO: 0.6 % (ref 0–1.8)
BASOPHILS # BLD: 0.04 K/UL (ref 0–0.12)
BILIRUB SERPL-MCNC: 0.3 MG/DL (ref 0.1–1.5)
BUN SERPL-MCNC: 9 MG/DL (ref 8–22)
CALCIUM SERPL-MCNC: 8.7 MG/DL (ref 8.5–10.5)
CHLORIDE SERPL-SCNC: 106 MMOL/L (ref 96–112)
CHOLEST SERPL-MCNC: 261 MG/DL (ref 100–199)
CO2 SERPL-SCNC: 20 MMOL/L (ref 20–33)
CREAT SERPL-MCNC: 0.49 MG/DL (ref 0.5–1.4)
EOSINOPHIL # BLD AUTO: 0.12 K/UL (ref 0–0.51)
EOSINOPHIL NFR BLD: 1.7 % (ref 0–6.9)
ERYTHROCYTE [DISTWIDTH] IN BLOOD BY AUTOMATED COUNT: 52.7 FL (ref 35.9–50)
FASTING STATUS PATIENT QL REPORTED: NORMAL
FERRITIN SERPL-MCNC: 11.8 NG/ML (ref 10–291)
GFR SERPLBLD CREATININE-BSD FMLA CKD-EPI: 121 ML/MIN/1.73 M 2
GLOBULIN SER CALC-MCNC: 2.9 G/DL (ref 1.9–3.5)
GLUCOSE SERPL-MCNC: 81 MG/DL (ref 65–99)
HCT VFR BLD AUTO: 34.2 % (ref 37–47)
HDLC SERPL-MCNC: 67 MG/DL
HGB BLD-MCNC: 11.5 G/DL (ref 12–16)
IMM GRANULOCYTES # BLD AUTO: 0.07 K/UL (ref 0–0.11)
IMM GRANULOCYTES NFR BLD AUTO: 1 % (ref 0–0.9)
IRON SATN MFR SERPL: 35 % (ref 15–55)
IRON SERPL-MCNC: 132 UG/DL (ref 40–170)
LDLC SERPL CALC-MCNC: 149 MG/DL
LYMPHOCYTES # BLD AUTO: 1.44 K/UL (ref 1–4.8)
LYMPHOCYTES NFR BLD: 20 % (ref 22–41)
MCH RBC QN AUTO: 35 PG (ref 27–33)
MCHC RBC AUTO-ENTMCNC: 33.6 G/DL (ref 33.6–35)
MCV RBC AUTO: 104 FL (ref 81.4–97.8)
MONOCYTES # BLD AUTO: 0.66 K/UL (ref 0–0.85)
MONOCYTES NFR BLD AUTO: 9.2 % (ref 0–13.4)
NEUTROPHILS # BLD AUTO: 4.87 K/UL (ref 2–7.15)
NEUTROPHILS NFR BLD: 67.5 % (ref 44–72)
NRBC # BLD AUTO: 0 K/UL
NRBC BLD-RTO: 0 /100 WBC
PLATELET # BLD AUTO: 192 K/UL (ref 164–446)
PMV BLD AUTO: 9.9 FL (ref 9–12.9)
POTASSIUM SERPL-SCNC: 4.7 MMOL/L (ref 3.6–5.5)
PROT SERPL-MCNC: 6.2 G/DL (ref 6–8.2)
RBC # BLD AUTO: 3.29 M/UL (ref 4.2–5.4)
SODIUM SERPL-SCNC: 138 MMOL/L (ref 135–145)
T3FREE SERPL-MCNC: 2.68 PG/ML (ref 2–4.4)
T4 FREE SERPL-MCNC: 0.96 NG/DL (ref 0.93–1.7)
TIBC SERPL-MCNC: 380 UG/DL (ref 250–450)
TRIGL SERPL-MCNC: 225 MG/DL (ref 0–149)
TSH SERPL DL<=0.005 MIU/L-ACNC: 1.07 UIU/ML (ref 0.38–5.33)
UIBC SERPL-MCNC: 248 UG/DL (ref 110–370)
WBC # BLD AUTO: 7.2 K/UL (ref 4.8–10.8)

## 2022-11-08 PROCEDURE — 83540 ASSAY OF IRON: CPT

## 2022-11-08 PROCEDURE — 84439 ASSAY OF FREE THYROXINE: CPT

## 2022-11-08 PROCEDURE — 84443 ASSAY THYROID STIM HORMONE: CPT

## 2022-11-08 PROCEDURE — 82728 ASSAY OF FERRITIN: CPT

## 2022-11-08 PROCEDURE — 83550 IRON BINDING TEST: CPT

## 2022-11-08 PROCEDURE — 80061 LIPID PANEL: CPT

## 2022-11-08 PROCEDURE — 36415 COLL VENOUS BLD VENIPUNCTURE: CPT

## 2022-11-08 PROCEDURE — 80053 COMPREHEN METABOLIC PANEL: CPT

## 2022-11-08 PROCEDURE — 82306 VITAMIN D 25 HYDROXY: CPT

## 2022-11-08 PROCEDURE — 84481 FREE ASSAY (FT-3): CPT

## 2022-11-08 PROCEDURE — 85025 COMPLETE CBC W/AUTO DIFF WBC: CPT

## 2022-12-14 ENCOUNTER — PRE-ADMISSION TESTING (OUTPATIENT)
Dept: ADMISSIONS | Facility: MEDICAL CENTER | Age: 42
End: 2022-12-14
Attending: OBSTETRICS & GYNECOLOGY
Payer: COMMERCIAL

## 2022-12-16 ENCOUNTER — HOSPITAL ENCOUNTER (EMERGENCY)
Facility: MEDICAL CENTER | Age: 42
End: 2022-12-16
Attending: OBSTETRICS & GYNECOLOGY | Admitting: OBSTETRICS & GYNECOLOGY
Payer: COMMERCIAL

## 2022-12-16 VITALS
RESPIRATION RATE: 15 BRPM | SYSTOLIC BLOOD PRESSURE: 122 MMHG | BODY MASS INDEX: 29.41 KG/M2 | WEIGHT: 149.8 LBS | OXYGEN SATURATION: 98 % | TEMPERATURE: 97.2 F | DIASTOLIC BLOOD PRESSURE: 72 MMHG | HEIGHT: 60 IN | HEART RATE: 93 BPM

## 2022-12-16 PROCEDURE — 59025 FETAL NON-STRESS TEST: CPT

## 2022-12-16 PROCEDURE — 99282 EMERGENCY DEPT VISIT SF MDM: CPT

## 2022-12-16 ASSESSMENT — PAIN SCALES - GENERAL: PAINLEVEL: 0 - NO PAIN

## 2022-12-16 ASSESSMENT — FIBROSIS 4 INDEX: FIB4 SCORE: 0.83

## 2022-12-17 NOTE — PROGRESS NOTES
Pt presents to L&D for further monitoring due to a decel at pts scheduled NST in the office. Pt ambulated to [facility] for assessment.     1628 TOCO and EFM applied, VSS. Pt reports +FM, denies LOF or VB. Orders from Dr. Cardona to watch pt for at least an hour. Will reassess when out of surgery.     1805 Dr. Cardona at bedside, POC discussed and FHT reviewed.     1814 RN at bedside, labor precautions given and all questions answered.     1818 Pt discharged home in stable condition.

## 2022-12-17 NOTE — ED PROVIDER NOTES
Obstetrics and Gynecology  Obstetric Emergency Department Assessment Note    ID: 42 y.o. is a  with IUP at 37w0d    Primary Obstetrician: La Lacey M.D.    Assessing Obstetrician: Delia Sales MD    S: Presents from office for prolonged monitoring due to FHR deceleration in the office while doing NST. She was doing NST in the office today due to AMA and with a contraction when she rolled over from her back to her side the baby had a variable deceleration but was otherwise reactive. Pt here has no complaints. No VB, CTX, or LOF. Baby moving well. She has 1 prior  and would like to TOLAC.    ROS: 10 systems negative except as noted above.    O: /72   Pulse 93   Temp 36.2 °C (97.2 °F) (Temporal)   Resp 15   Ht 1.524 m (5')   Wt 67.9 kg (149 lb 12.8 oz)   SpO2 98%   BMI 29.26 kg/m²    Gen: NAD, AAO  HEENT: NC/AT, MMM  Neck: supple  Abd: Gravid, soft, uterus NTTP, no rebound/guarding  Ext: WWP, 2+ DPP, no edema  Skin: no rash  Neuro: no gross deficits, EOMI  Psy: mood good, affect normal and congruent  Pelvic: SVE deferred    NST Report Review:  NST: 120s, positive accels, negative decels, moderate variability, reactive NST, category 1  [place]: one contraction on monitor    Assessment: Pauline Sharp is a 42 y.o.  at 37w0d  FHR deceleration x 1 in the office - no decels here at L&D on prolonged monitoring  Reassuring, reactive NST.  Prior  x 1 - desires TOLAC  AMA     Plan:  Pt discharged to home  FKC educated in detail  F/u with [facility] in 3 days as scheduled  Return here prn any concerns    Evaluation and clinical decision making including analysis of fetal & maternal data completed over a 20 minute period.        Delia Sales M.D., 2022, 6:17 PM

## 2022-12-31 ENCOUNTER — ANESTHESIA EVENT (OUTPATIENT)
Dept: OBGYN | Facility: MEDICAL CENTER | Age: 42
End: 2022-12-31
Payer: COMMERCIAL

## 2023-01-02 ENCOUNTER — ANESTHESIA (OUTPATIENT)
Dept: OBGYN | Facility: MEDICAL CENTER | Age: 43
End: 2023-01-02
Payer: COMMERCIAL

## 2023-01-02 ENCOUNTER — HOSPITAL ENCOUNTER (INPATIENT)
Facility: MEDICAL CENTER | Age: 43
LOS: 2 days | End: 2023-01-04
Attending: OBSTETRICS & GYNECOLOGY | Admitting: OBSTETRICS & GYNECOLOGY
Payer: COMMERCIAL

## 2023-01-02 LAB
BASOPHILS # BLD AUTO: 0.4 % (ref 0–1.8)
BASOPHILS # BLD: 0.03 K/UL (ref 0–0.12)
EOSINOPHIL # BLD AUTO: 0.12 K/UL (ref 0–0.51)
EOSINOPHIL NFR BLD: 1.5 % (ref 0–6.9)
ERYTHROCYTE [DISTWIDTH] IN BLOOD BY AUTOMATED COUNT: 54.2 FL (ref 35.9–50)
ERYTHROCYTE [DISTWIDTH] IN BLOOD BY AUTOMATED COUNT: 54.3 FL (ref 35.9–50)
HCT VFR BLD AUTO: 29.3 % (ref 37–47)
HCT VFR BLD AUTO: 33.9 % (ref 37–47)
HGB BLD-MCNC: 11.5 G/DL (ref 12–16)
HGB BLD-MCNC: 9.9 G/DL (ref 12–16)
HOLDING TUBE BB 8507: NORMAL
IMM GRANULOCYTES # BLD AUTO: 0.08 K/UL (ref 0–0.11)
IMM GRANULOCYTES NFR BLD AUTO: 1 % (ref 0–0.9)
LYMPHOCYTES # BLD AUTO: 1.65 K/UL (ref 1–4.8)
LYMPHOCYTES NFR BLD: 20.4 % (ref 22–41)
MCH RBC QN AUTO: 35 PG (ref 27–33)
MCH RBC QN AUTO: 35 PG (ref 27–33)
MCHC RBC AUTO-ENTMCNC: 33.8 G/DL (ref 33.6–35)
MCHC RBC AUTO-ENTMCNC: 33.9 G/DL (ref 33.6–35)
MCV RBC AUTO: 103 FL (ref 81.4–97.8)
MCV RBC AUTO: 103.5 FL (ref 81.4–97.8)
MONOCYTES # BLD AUTO: 0.78 K/UL (ref 0–0.85)
MONOCYTES NFR BLD AUTO: 9.7 % (ref 0–13.4)
NEUTROPHILS # BLD AUTO: 5.42 K/UL (ref 2–7.15)
NEUTROPHILS NFR BLD: 67 % (ref 44–72)
NRBC # BLD AUTO: 0 K/UL
NRBC BLD-RTO: 0 /100 WBC
PLATELET # BLD AUTO: 160 K/UL (ref 164–446)
PLATELET # BLD AUTO: 160 K/UL (ref 164–446)
PMV BLD AUTO: 9.9 FL (ref 9–12.9)
PMV BLD AUTO: 9.9 FL (ref 9–12.9)
RBC # BLD AUTO: 2.83 M/UL (ref 4.2–5.4)
RBC # BLD AUTO: 3.29 M/UL (ref 4.2–5.4)
T PALLIDUM AB SER QL IA: NORMAL
WBC # BLD AUTO: 10.5 K/UL (ref 4.8–10.8)
WBC # BLD AUTO: 8.1 K/UL (ref 4.8–10.8)

## 2023-01-02 PROCEDURE — 85025 COMPLETE CBC W/AUTO DIFF WBC: CPT

## 2023-01-02 PROCEDURE — 160002 HCHG RECOVERY MINUTES (STAT): Performed by: OBSTETRICS & GYNECOLOGY

## 2023-01-02 PROCEDURE — 700101 HCHG RX REV CODE 250: Performed by: STUDENT IN AN ORGANIZED HEALTH CARE EDUCATION/TRAINING PROGRAM

## 2023-01-02 PROCEDURE — 160048 HCHG OR STATISTICAL LEVEL 1-5: Performed by: OBSTETRICS & GYNECOLOGY

## 2023-01-02 PROCEDURE — 700111 HCHG RX REV CODE 636 W/ 250 OVERRIDE (IP): Performed by: OBSTETRICS & GYNECOLOGY

## 2023-01-02 PROCEDURE — 700111 HCHG RX REV CODE 636 W/ 250 OVERRIDE (IP): Performed by: STUDENT IN AN ORGANIZED HEALTH CARE EDUCATION/TRAINING PROGRAM

## 2023-01-02 PROCEDURE — 01961 ANES CESAREAN DELIVERY ONLY: CPT | Performed by: STUDENT IN AN ORGANIZED HEALTH CARE EDUCATION/TRAINING PROGRAM

## 2023-01-02 PROCEDURE — 160009 HCHG ANES TIME/MIN: Performed by: OBSTETRICS & GYNECOLOGY

## 2023-01-02 PROCEDURE — 86780 TREPONEMA PALLIDUM: CPT

## 2023-01-02 PROCEDURE — 770002 HCHG ROOM/CARE - OB PRIVATE (112)

## 2023-01-02 PROCEDURE — A9270 NON-COVERED ITEM OR SERVICE: HCPCS | Performed by: OBSTETRICS & GYNECOLOGY

## 2023-01-02 PROCEDURE — 36415 COLL VENOUS BLD VENIPUNCTURE: CPT

## 2023-01-02 PROCEDURE — 160029 HCHG SURGERY MINUTES - 1ST 30 MINS LEVEL 4: Performed by: OBSTETRICS & GYNECOLOGY

## 2023-01-02 PROCEDURE — 700102 HCHG RX REV CODE 250 W/ 637 OVERRIDE(OP): Performed by: STUDENT IN AN ORGANIZED HEALTH CARE EDUCATION/TRAINING PROGRAM

## 2023-01-02 PROCEDURE — A9270 NON-COVERED ITEM OR SERVICE: HCPCS | Performed by: STUDENT IN AN ORGANIZED HEALTH CARE EDUCATION/TRAINING PROGRAM

## 2023-01-02 PROCEDURE — 700105 HCHG RX REV CODE 258: Performed by: OBSTETRICS & GYNECOLOGY

## 2023-01-02 PROCEDURE — 160041 HCHG SURGERY MINUTES - EA ADDL 1 MIN LEVEL 4: Performed by: OBSTETRICS & GYNECOLOGY

## 2023-01-02 PROCEDURE — 700102 HCHG RX REV CODE 250 W/ 637 OVERRIDE(OP): Performed by: OBSTETRICS & GYNECOLOGY

## 2023-01-02 PROCEDURE — 700105 HCHG RX REV CODE 258: Performed by: STUDENT IN AN ORGANIZED HEALTH CARE EDUCATION/TRAINING PROGRAM

## 2023-01-02 PROCEDURE — 85027 COMPLETE CBC AUTOMATED: CPT

## 2023-01-02 PROCEDURE — C1755 CATHETER, INTRASPINAL: HCPCS | Performed by: OBSTETRICS & GYNECOLOGY

## 2023-01-02 PROCEDURE — 160035 HCHG PACU - 1ST 60 MINS PHASE I: Performed by: OBSTETRICS & GYNECOLOGY

## 2023-01-02 RX ORDER — OXYCODONE HCL 5 MG/5 ML
10 SOLUTION, ORAL ORAL
Status: DISCONTINUED | OUTPATIENT
Start: 2023-01-02 | End: 2023-01-02 | Stop reason: HOSPADM

## 2023-01-02 RX ORDER — OXYCODONE HCL 5 MG/5 ML
5 SOLUTION, ORAL ORAL
Status: DISCONTINUED | OUTPATIENT
Start: 2023-01-02 | End: 2023-01-02 | Stop reason: HOSPADM

## 2023-01-02 RX ORDER — DIPHENHYDRAMINE HYDROCHLORIDE 50 MG/ML
25 INJECTION INTRAMUSCULAR; INTRAVENOUS EVERY 6 HOURS PRN
Status: DISCONTINUED | OUTPATIENT
Start: 2023-01-02 | End: 2023-01-03

## 2023-01-02 RX ORDER — ONDANSETRON 2 MG/ML
INJECTION INTRAMUSCULAR; INTRAVENOUS PRN
Status: DISCONTINUED | OUTPATIENT
Start: 2023-01-02 | End: 2023-01-02 | Stop reason: SURG

## 2023-01-02 RX ORDER — OXYTOCIN 10 [USP'U]/ML
INJECTION, SOLUTION INTRAMUSCULAR; INTRAVENOUS PRN
Status: DISCONTINUED | OUTPATIENT
Start: 2023-01-02 | End: 2023-01-02 | Stop reason: SURG

## 2023-01-02 RX ORDER — SODIUM CHLORIDE, SODIUM LACTATE, POTASSIUM CHLORIDE, CALCIUM CHLORIDE 600; 310; 30; 20 MG/100ML; MG/100ML; MG/100ML; MG/100ML
INJECTION, SOLUTION INTRAVENOUS PRN
Status: DISCONTINUED | OUTPATIENT
Start: 2023-01-02 | End: 2023-01-04 | Stop reason: HOSPADM

## 2023-01-02 RX ORDER — KETOROLAC TROMETHAMINE 30 MG/ML
30 INJECTION, SOLUTION INTRAMUSCULAR; INTRAVENOUS EVERY 6 HOURS
Status: COMPLETED | OUTPATIENT
Start: 2023-01-02 | End: 2023-01-03

## 2023-01-02 RX ORDER — IBUPROFEN 800 MG/1
800 TABLET ORAL EVERY 8 HOURS
Status: DISCONTINUED | OUTPATIENT
Start: 2023-01-03 | End: 2023-01-04 | Stop reason: HOSPADM

## 2023-01-02 RX ORDER — KETOROLAC TROMETHAMINE 30 MG/ML
INJECTION, SOLUTION INTRAMUSCULAR; INTRAVENOUS PRN
Status: DISCONTINUED | OUTPATIENT
Start: 2023-01-02 | End: 2023-01-02 | Stop reason: SURG

## 2023-01-02 RX ORDER — DOCUSATE SODIUM 100 MG/1
100 CAPSULE, LIQUID FILLED ORAL 2 TIMES DAILY PRN
Status: DISCONTINUED | OUTPATIENT
Start: 2023-01-02 | End: 2023-01-04 | Stop reason: HOSPADM

## 2023-01-02 RX ORDER — DEXAMETHASONE SODIUM PHOSPHATE 4 MG/ML
INJECTION, SOLUTION INTRA-ARTICULAR; INTRALESIONAL; INTRAMUSCULAR; INTRAVENOUS; SOFT TISSUE PRN
Status: DISCONTINUED | OUTPATIENT
Start: 2023-01-02 | End: 2023-01-02 | Stop reason: SURG

## 2023-01-02 RX ORDER — ACETAMINOPHEN 500 MG
1000 TABLET ORAL EVERY 6 HOURS
Status: DISPENSED | OUTPATIENT
Start: 2023-01-02 | End: 2023-01-03

## 2023-01-02 RX ORDER — HYDROMORPHONE HYDROCHLORIDE 1 MG/ML
0.4 INJECTION, SOLUTION INTRAMUSCULAR; INTRAVENOUS; SUBCUTANEOUS
Status: DISCONTINUED | OUTPATIENT
Start: 2023-01-02 | End: 2023-01-02 | Stop reason: HOSPADM

## 2023-01-02 RX ORDER — HALOPERIDOL 5 MG/ML
1 INJECTION INTRAMUSCULAR
Status: DISCONTINUED | OUTPATIENT
Start: 2023-01-02 | End: 2023-01-02 | Stop reason: HOSPADM

## 2023-01-02 RX ORDER — ACETAMINOPHEN 500 MG
1000 TABLET ORAL EVERY 6 HOURS
Status: DISCONTINUED | OUTPATIENT
Start: 2023-01-03 | End: 2023-01-04 | Stop reason: HOSPADM

## 2023-01-02 RX ORDER — OXYCODONE HYDROCHLORIDE 5 MG/1
5 TABLET ORAL EVERY 4 HOURS PRN
Status: DISCONTINUED | OUTPATIENT
Start: 2023-01-02 | End: 2023-01-03

## 2023-01-02 RX ORDER — OXYTOCIN 10 [USP'U]/ML
10 INJECTION, SOLUTION INTRAMUSCULAR; INTRAVENOUS
Status: DISCONTINUED | OUTPATIENT
Start: 2023-01-02 | End: 2023-01-04 | Stop reason: HOSPADM

## 2023-01-02 RX ORDER — SODIUM CHLORIDE, SODIUM LACTATE, POTASSIUM CHLORIDE, CALCIUM CHLORIDE 600; 310; 30; 20 MG/100ML; MG/100ML; MG/100ML; MG/100ML
INJECTION, SOLUTION INTRAVENOUS ONCE
Status: ACTIVE | OUTPATIENT
Start: 2023-01-02 | End: 2023-01-03

## 2023-01-02 RX ORDER — METOCLOPRAMIDE HYDROCHLORIDE 5 MG/ML
10 INJECTION INTRAMUSCULAR; INTRAVENOUS ONCE
Status: COMPLETED | OUTPATIENT
Start: 2023-01-02 | End: 2023-01-02

## 2023-01-02 RX ORDER — ONDANSETRON 4 MG/1
4 TABLET, ORALLY DISINTEGRATING ORAL EVERY 6 HOURS PRN
Status: DISCONTINUED | OUTPATIENT
Start: 2023-01-02 | End: 2023-01-04 | Stop reason: HOSPADM

## 2023-01-02 RX ORDER — SODIUM CHLORIDE, SODIUM GLUCONATE, SODIUM ACETATE, POTASSIUM CHLORIDE AND MAGNESIUM CHLORIDE 526; 502; 368; 37; 30 MG/100ML; MG/100ML; MG/100ML; MG/100ML; MG/100ML
1500 INJECTION, SOLUTION INTRAVENOUS ONCE
Status: COMPLETED | OUTPATIENT
Start: 2023-01-02 | End: 2023-01-02

## 2023-01-02 RX ORDER — CEFAZOLIN SODIUM 1 G/3ML
INJECTION, POWDER, FOR SOLUTION INTRAMUSCULAR; INTRAVENOUS PRN
Status: DISCONTINUED | OUTPATIENT
Start: 2023-01-02 | End: 2023-01-02 | Stop reason: SURG

## 2023-01-02 RX ORDER — SODIUM CHLORIDE, SODIUM GLUCONATE, SODIUM ACETATE, POTASSIUM CHLORIDE AND MAGNESIUM CHLORIDE 526; 502; 368; 37; 30 MG/100ML; MG/100ML; MG/100ML; MG/100ML; MG/100ML
INJECTION, SOLUTION INTRAVENOUS
Status: DISCONTINUED | OUTPATIENT
Start: 2023-01-02 | End: 2023-01-02 | Stop reason: SURG

## 2023-01-02 RX ORDER — IBUPROFEN 800 MG/1
800 TABLET ORAL EVERY 8 HOURS PRN
Status: DISCONTINUED | OUTPATIENT
Start: 2023-01-06 | End: 2023-01-04 | Stop reason: HOSPADM

## 2023-01-02 RX ORDER — ACETAMINOPHEN 500 MG
1000 TABLET ORAL EVERY 6 HOURS PRN
Status: DISCONTINUED | OUTPATIENT
Start: 2023-01-06 | End: 2023-01-04 | Stop reason: HOSPADM

## 2023-01-02 RX ORDER — DIPHENHYDRAMINE HYDROCHLORIDE 50 MG/ML
25 INJECTION INTRAMUSCULAR; INTRAVENOUS EVERY 6 HOURS PRN
Status: DISCONTINUED | OUTPATIENT
Start: 2023-01-03 | End: 2023-01-04 | Stop reason: HOSPADM

## 2023-01-02 RX ORDER — HYDROMORPHONE HYDROCHLORIDE 1 MG/ML
0.1 INJECTION, SOLUTION INTRAMUSCULAR; INTRAVENOUS; SUBCUTANEOUS
Status: DISCONTINUED | OUTPATIENT
Start: 2023-01-02 | End: 2023-01-02 | Stop reason: HOSPADM

## 2023-01-02 RX ORDER — OXYCODONE HYDROCHLORIDE 10 MG/1
10 TABLET ORAL EVERY 4 HOURS PRN
Status: DISCONTINUED | OUTPATIENT
Start: 2023-01-03 | End: 2023-01-04 | Stop reason: HOSPADM

## 2023-01-02 RX ORDER — SODIUM CHLORIDE, SODIUM LACTATE, POTASSIUM CHLORIDE, CALCIUM CHLORIDE 600; 310; 30; 20 MG/100ML; MG/100ML; MG/100ML; MG/100ML
INJECTION, SOLUTION INTRAVENOUS CONTINUOUS
Status: DISCONTINUED | OUTPATIENT
Start: 2023-01-02 | End: 2023-01-04 | Stop reason: HOSPADM

## 2023-01-02 RX ORDER — DIPHENHYDRAMINE HYDROCHLORIDE 50 MG/ML
12.5 INJECTION INTRAMUSCULAR; INTRAVENOUS EVERY 6 HOURS PRN
Status: DISCONTINUED | OUTPATIENT
Start: 2023-01-02 | End: 2023-01-03

## 2023-01-02 RX ORDER — CEFAZOLIN SODIUM 1 G/3ML
2 INJECTION, POWDER, FOR SOLUTION INTRAMUSCULAR; INTRAVENOUS ONCE
Status: DISCONTINUED | OUTPATIENT
Start: 2023-01-02 | End: 2023-01-02 | Stop reason: HOSPADM

## 2023-01-02 RX ORDER — BUPIVACAINE HYDROCHLORIDE 7.5 MG/ML
INJECTION, SOLUTION INTRASPINAL
Status: COMPLETED | OUTPATIENT
Start: 2023-01-02 | End: 2023-01-02

## 2023-01-02 RX ORDER — SIMETHICONE 125 MG
125 TABLET,CHEWABLE ORAL 4 TIMES DAILY PRN
Status: DISCONTINUED | OUTPATIENT
Start: 2023-01-02 | End: 2023-01-04 | Stop reason: HOSPADM

## 2023-01-02 RX ORDER — ONDANSETRON 4 MG/1
4 TABLET, ORALLY DISINTEGRATING ORAL EVERY 6 HOURS PRN
Status: DISCONTINUED | OUTPATIENT
Start: 2023-01-03 | End: 2023-01-02

## 2023-01-02 RX ORDER — DIPHENHYDRAMINE HYDROCHLORIDE 50 MG/ML
12.5 INJECTION INTRAMUSCULAR; INTRAVENOUS
Status: DISCONTINUED | OUTPATIENT
Start: 2023-01-02 | End: 2023-01-02 | Stop reason: HOSPADM

## 2023-01-02 RX ORDER — ONDANSETRON 2 MG/ML
4 INJECTION INTRAMUSCULAR; INTRAVENOUS EVERY 6 HOURS PRN
Status: DISCONTINUED | OUTPATIENT
Start: 2023-01-03 | End: 2023-01-02

## 2023-01-02 RX ORDER — CITRIC ACID/SODIUM CITRATE 334-500MG
30 SOLUTION, ORAL ORAL ONCE
Status: COMPLETED | OUTPATIENT
Start: 2023-01-02 | End: 2023-01-02

## 2023-01-02 RX ORDER — ONDANSETRON 2 MG/ML
4 INJECTION INTRAMUSCULAR; INTRAVENOUS
Status: COMPLETED | OUTPATIENT
Start: 2023-01-02 | End: 2023-01-02

## 2023-01-02 RX ORDER — DIPHENHYDRAMINE HCL 25 MG
25 TABLET ORAL EVERY 6 HOURS PRN
Status: DISCONTINUED | OUTPATIENT
Start: 2023-01-03 | End: 2023-01-04 | Stop reason: HOSPADM

## 2023-01-02 RX ORDER — CALCIUM CARBONATE 500 MG/1
1000 TABLET, CHEWABLE ORAL EVERY 6 HOURS PRN
Status: DISCONTINUED | OUTPATIENT
Start: 2023-01-02 | End: 2023-01-04 | Stop reason: HOSPADM

## 2023-01-02 RX ORDER — HYDROMORPHONE HYDROCHLORIDE 1 MG/ML
0.2 INJECTION, SOLUTION INTRAMUSCULAR; INTRAVENOUS; SUBCUTANEOUS
Status: DISCONTINUED | OUTPATIENT
Start: 2023-01-02 | End: 2023-01-02 | Stop reason: HOSPADM

## 2023-01-02 RX ORDER — MEPERIDINE HYDROCHLORIDE 25 MG/ML
6.25 INJECTION INTRAMUSCULAR; INTRAVENOUS; SUBCUTANEOUS
Status: DISCONTINUED | OUTPATIENT
Start: 2023-01-02 | End: 2023-01-02 | Stop reason: HOSPADM

## 2023-01-02 RX ORDER — ONDANSETRON 2 MG/ML
4 INJECTION INTRAMUSCULAR; INTRAVENOUS EVERY 6 HOURS PRN
Status: DISCONTINUED | OUTPATIENT
Start: 2023-01-02 | End: 2023-01-04 | Stop reason: HOSPADM

## 2023-01-02 RX ORDER — OXYCODONE HYDROCHLORIDE 5 MG/1
5 TABLET ORAL EVERY 4 HOURS PRN
Status: DISCONTINUED | OUTPATIENT
Start: 2023-01-03 | End: 2023-01-04 | Stop reason: HOSPADM

## 2023-01-02 RX ORDER — MORPHINE SULFATE 0.5 MG/ML
INJECTION, SOLUTION EPIDURAL; INTRATHECAL; INTRAVENOUS
Status: COMPLETED | OUTPATIENT
Start: 2023-01-02 | End: 2023-01-02

## 2023-01-02 RX ADMIN — CEFAZOLIN 2 G: 330 INJECTION, POWDER, FOR SOLUTION INTRAMUSCULAR; INTRAVENOUS at 09:42

## 2023-01-02 RX ADMIN — SODIUM CHLORIDE, SODIUM GLUCONATE, SODIUM ACETATE, POTASSIUM CHLORIDE AND MAGNESIUM CHLORIDE 1500 ML: 526; 502; 368; 37; 30 INJECTION, SOLUTION INTRAVENOUS at 06:25

## 2023-01-02 RX ADMIN — BUPIVACAINE HYDROCHLORIDE IN DEXTROSE 1.6 ML: 7.5 INJECTION, SOLUTION SUBARACHNOID at 09:35

## 2023-01-02 RX ADMIN — KETOROLAC TROMETHAMINE 15 MG: 30 INJECTION, SOLUTION INTRAMUSCULAR at 10:17

## 2023-01-02 RX ADMIN — METOCLOPRAMIDE 10 MG: 5 INJECTION, SOLUTION INTRAMUSCULAR; INTRAVENOUS at 07:11

## 2023-01-02 RX ADMIN — SODIUM CHLORIDE, SODIUM GLUCONATE, SODIUM ACETATE, POTASSIUM CHLORIDE AND MAGNESIUM CHLORIDE: 526; 502; 368; 37; 30 INJECTION, SOLUTION INTRAVENOUS at 09:28

## 2023-01-02 RX ADMIN — SODIUM CITRATE AND CITRIC ACID MONOHYDRATE 30 ML: 500; 334 SOLUTION ORAL at 07:11

## 2023-01-02 RX ADMIN — OXYTOCIN 20 UNITS: 10 INJECTION, SOLUTION INTRAMUSCULAR; INTRAVENOUS at 09:59

## 2023-01-02 RX ADMIN — PHENYLEPHRINE HYDROCHLORIDE 40 MCG/MIN: 10 INJECTION INTRAVENOUS at 09:41

## 2023-01-02 RX ADMIN — ACETAMINOPHEN 1000 MG: 500 TABLET ORAL at 16:18

## 2023-01-02 RX ADMIN — MORPHINE SULFATE 100 MCG: 0.5 INJECTION, SOLUTION EPIDURAL; INTRATHECAL; INTRAVENOUS at 09:35

## 2023-01-02 RX ADMIN — ONDANSETRON 4 MG: 2 INJECTION INTRAMUSCULAR; INTRAVENOUS at 18:13

## 2023-01-02 RX ADMIN — KETOROLAC TROMETHAMINE 30 MG: 30 INJECTION, SOLUTION INTRAMUSCULAR; INTRAVENOUS at 21:57

## 2023-01-02 RX ADMIN — FAMOTIDINE 20 MG: 10 INJECTION, SOLUTION INTRAVENOUS at 07:11

## 2023-01-02 RX ADMIN — KETOROLAC TROMETHAMINE 30 MG: 30 INJECTION, SOLUTION INTRAMUSCULAR; INTRAVENOUS at 16:17

## 2023-01-02 RX ADMIN — FENTANYL CITRATE 10 MCG: 50 INJECTION, SOLUTION INTRAMUSCULAR; INTRAVENOUS at 09:35

## 2023-01-02 RX ADMIN — SODIUM CHLORIDE, POTASSIUM CHLORIDE, SODIUM LACTATE AND CALCIUM CHLORIDE 1000 ML: 600; 310; 30; 20 INJECTION, SOLUTION INTRAVENOUS at 18:29

## 2023-01-02 RX ADMIN — DEXAMETHASONE SODIUM PHOSPHATE 4 MG: 4 INJECTION, SOLUTION INTRA-ARTICULAR; INTRALESIONAL; INTRAMUSCULAR; INTRAVENOUS; SOFT TISSUE at 09:44

## 2023-01-02 RX ADMIN — ONDANSETRON 4 MG: 2 INJECTION INTRAMUSCULAR; INTRAVENOUS at 09:31

## 2023-01-02 RX ADMIN — ONDANSETRON 4 MG: 2 INJECTION INTRAMUSCULAR; INTRAVENOUS at 11:50

## 2023-01-02 ASSESSMENT — FIBROSIS 4 INDEX: FIB4 SCORE: 0.83

## 2023-01-02 ASSESSMENT — PAIN DESCRIPTION - PAIN TYPE
TYPE: ACUTE PAIN
TYPE: ACUTE PAIN;SURGICAL PAIN

## 2023-01-02 ASSESSMENT — PATIENT HEALTH QUESTIONNAIRE - PHQ9
SUM OF ALL RESPONSES TO PHQ9 QUESTIONS 1 AND 2: 0
1. LITTLE INTEREST OR PLEASURE IN DOING THINGS: NOT AT ALL
2. FEELING DOWN, DEPRESSED, IRRITABLE, OR HOPELESS: NOT AT ALL

## 2023-01-02 ASSESSMENT — LIFESTYLE VARIABLES: EVER_SMOKED: NEVER

## 2023-01-02 NOTE — ANESTHESIA PROCEDURE NOTES
Spinal Block    Date/Time: 1/2/2023 9:35 AM  Performed by: Bob Vaughn M.D.  Authorized by: Bob Vaughn M.D.     Patient Location:  OR  Start Time:  1/2/2023 9:35 AM  End Time:  1/2/2023 9:39 AM  Reason for Block: primary anesthetic    patient identified, IV checked, site marked, risks and benefits discussed, surgical consent, monitors and equipment checked, pre-op evaluation and timeout performed    Patient Position:  Sitting  Prep: ChloraPrep, patient draped and sterile technique    Monitoring:  Blood pressure, continuous pulse oximetry and heart rate  Approach:  Midline  Location:  L4-5  Injection Technique:  Single-shot  Skin infiltration:  Lidocaine  Strength:  1%  Dose:  3ml  Needle Type:  Kelvin  Needle Gauge:  25 G  CSF flowing pre/post injection:  Yes  Sensory Level:  T4

## 2023-01-02 NOTE — PROGRESS NOTES
Pt arrives for scheduled repeat c/s. Pt prepped for surgery. EFM/[place] applied.     0928: Into OR1 for repeat c/s.     0958: Delivery of viable female infant.     1205: Pt up to postpartum via gurney with infant in arms. Report to Mara LAINEZ, POC discussed.

## 2023-01-02 NOTE — FLOWSHEET NOTE
Attendance at Delivery    Reason for attendance   Oxygen Needed yes  Positive Pressure Needed no  Baby Vigorous yes  Evidence of Meconium no    Baby brought over to the radiant warmer after 30 sec delayed cord clamp. Mouth, nares and stomach sucioned. Good tone and strong cry. HR 140s, Spo2 improving on 30% Fio2. BS were coarse bilaterally. CPT x 6 mn. Baby responded well. O2 weaned to RA. Baby left with L&D nurse. APGARs 8/8

## 2023-01-02 NOTE — OR SURGEON
Immediate Post OP Note    PreOp Diagnosis: 43yo  at 39w3d, hx previous c/sec, desires repeat      PostOp Diagnosis: same      Procedure(s):   SECTION, REPEAT - Wound Class: Clean Contaminated    Surgeon(s):  ULICES Kingsley M.D.    Anesthesiologist/Type of Anesthesia:  Anesthesiologist: Bob Vaughn M.D./Spinal    Surgical Staff:  Circulator: Verena Zaragoza R.N.  Scrub Person: Cydney Housre  L&SUREKHA Baby  Nurse: Annika Sims R.N.    Specimens removed if any:  * No specimens in log *    Estimated Blood Loss: 1000ml    Findings: female, vertex, with clear amniotic fluid,uterine wall very thick, bladder adherent to DARCIE,normal adnexa bilaterally    Complications: none; dict [id number]        2023 10:47 AM La Lacey M.D.

## 2023-01-02 NOTE — H&P
DATE OF ADMISSION:  2023     IDENTIFICATION:  This is a 42-year-old  3, para 1-0-1-1 at 39 weeks and   3 days, who is here for repeat  section.     HISTORY OF PRESENT ILLNESS:  This is a patient of mine.  Pregnancy is   complicated by advanced maternal age.  [facility] has [facility].  She did have an NIPT that was low risk consistent with a female.    She declined an amniocentesis.  Growth has been followed, at 32 weeks growth   was at the 13th percentile, but at 36 weeks, it was backed up to the 20th   percentile with normal fluid.  She has been undergoing twice weekly NSTs and   BPPs and these have all been normal.  She has a history of a previous   .  She was interested in a trial of labor if she went into labor on   her own, if not she requested a repeat  between 39 and 40 weeks,   which is why we are here today.  She is not currently in labor.  Her cervix is   unfavorable.  She did also have a history of COVID at 14 weeks.  She is   vaccinated.  Her early 1-hour glucose test was 167, 3-hour was normal.  A   repeat 3-hour at 28 weeks was normal.  Her GBS is negative.  She desires   repeat  today.     PAST MEDICAL HISTORY:  History of abnormal Pap smears and that she had a LEEP   in .  Her most recent Pap smear was normal this pregnancy.     PAST SURGICAL HISTORY:  LEEP, thumb surgery, and a  section in   2021.     SOCIAL HISTORY:  She is  to her , Praveen.  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  She denies use of tobacco, alcohol or drugs.     FAMILY HISTORY:  Hemochromatosis in her father as well as kidney problems,   hypertension in her mother.     PHYSICAL EXAMINATION:  VITAL SIGNS:  Blood pressures have been normotensive.  She is afebrile.  GENERAL:  She is pleasant, cooperative and appears her stated age.  ABDOMEN:  Soft, gravid, Leopold's about 6.5-7 pounds.  Fetal heart tracings   category 1.   Tocometer reveals irregular contractions.     LABORATORY DATA:  CBC, her hemoglobin was 11.     ASSESSMENT AND PLAN:  A 42-year-old  3, para 1 female with a history at   39 weeks and 3 days with a history of a previous .  She is here for   repeat  section.  Her GBS is negative.  She declines salpingectomy.    We reviewed the surgery in detail including the risks of bleeding leading to a   blood transfusion, infection requiring IV antibiotics, damage to internal   organs including her bowel, bladder, ureters, blood vessels, and nerves.        ______________________________  MD RACHEL Paulino/    DD:  2023 07:39  DT:  2023 08:05    Job#:  [id number]

## 2023-01-02 NOTE — CARE PLAN
The patient is Watcher - Medium risk of patient condition declining or worsening    Shift Goals  Clinical Goals: VSS, early ambulation    Progress made toward(s) clinical / shift goals:    Problem: Knowledge Deficit - Standard  Goal: Patient and family/care givers will demonstrate understanding of plan of care, disease process/condition, diagnostic tests and medications  Outcome: Progressing     Problem: Knowledge Deficit - Postpartum  Goal: Patient will verbalize and demonstrate understanding of self and infant care  Outcome: Progressing     Problem: Respiratory/Oxygenation Function Post-Surgical  Goal: Patient will achieve/maintain normal respiratory rate/effort  1/2/2023 1314 by Brenda Zepeda R.RON.  Outcome: Progressing  1/2/2023 1314 by Brenda Zepeda R.N.  Outcome: Progressing     Problem: Pain - Standard  Goal: Alleviation of pain or a reduction in pain to the patient’s comfort goal  Outcome: Progressing       Patient is not progressing towards the following goals:

## 2023-01-02 NOTE — LACTATION NOTE
This note was copied from a baby's chart.  42yr, , 39wk3d, Repeat C/S    Baby now 5 hours old.    Pauline states that she was not successful breastfeeding her previous child even with OP lactation support and ended up doing a lot of pumping and providing.  Previous child had a tongue tie.      This baby was awake and showing feeding cues immediately after birth but mom says that she was not assisted and baby did not latch and breastfeed.    Baby asleep and skin to skin with mom.  Talked with parents about what to expect over the next few days and weeks of life.  Encouraged mom to keep baby skin to skin as much as possible while awake to become aware of hunger cues and to promote breastfeeding interest.    Taught mom how to hand express and colostrum was very easily expressed. Drops of colostrum spoon fed to baby.    Education provided to parents that it is normal  behavior to be sleepy in the first 24 hours of life and that, if baby not showing cues or interest in feeding, then can spoon feed colostrum every 2-3 hours until she wakes and breastfeeds.      [facility] Breastfeeding resources sheet provided

## 2023-01-02 NOTE — ANESTHESIA POSTPROCEDURE EVALUATION
Patient: Pauline Sharp    Procedure Summary     Date: 23 Room / Location: [facility] OR 02 / SURGERY LABOR AND DELIVERY    Anesthesia Start: 928 Anesthesia Stop:     Procedure:  SECTION, REPEAT (Abdomen) Diagnosis: (REPEAT  SECTION, 39.3 WEEKS, delivered)    Surgeons: La Lacey M.D. Responsible Provider: Bob Vaughn M.D.    Anesthesia Type: spinal ASA Status: 2          Final Anesthesia Type: spinal  Last vitals  BP   Blood Pressure: 94/61    Temp   36.3 °C (97.3 °F)    Pulse   78   Resp   16    SpO2   97 %      Anesthesia Post Evaluation    Patient location during evaluation: bedside  Patient participation: complete - patient participated  Level of consciousness: awake and alert    Airway patency: patent  Anesthetic complications: no  Cardiovascular status: hemodynamically stable  Respiratory status: acceptable  Hydration status: euvolemic    PONV: none    patient able to participate, but full recovery from regional anesthesia has not occurred and is not expected within the stipulated timeframe for the completion of the evaluation      No notable events documented.     Nurse Pain Score: 0 (NPRS)

## 2023-01-02 NOTE — ANESTHESIA PREPROCEDURE EVALUATION
Case: [id number] Date/Time: 2315    Procedure:  SECTION, REPEAT    Pre-op diagnosis: REPEAT  SECTION, 39.3 WEEKS    Location: [facility] OR  / SURGERY LABOR AND DELIVERY    Surgeons: La Lacey M.D.      43 yo F w/ hx of  @39+3 here for repeat section. NPO. Stopped aspirin at 38wk. Plt 160 today.    Relevant Problems   No relevant active problems       Physical Exam    Airway   Mallampati: II  TM distance: >3 FB  Neck ROM: full       Cardiovascular - normal exam  Rhythm: regular  Rate: normal  (-) murmur     Dental - normal exam           Pulmonary - normal exam  Breath sounds clear to auscultation     Abdominal     Comments: gravid   Neurological - normal exam               Anesthesia Plan    ASA 2       Plan - spinal   Neuraxial block will be primary anesthetic                Postoperative Plan: Postoperative administration of opioids is intended.    Pertinent diagnostic labs and testing reviewed    Informed Consent:    Anesthetic plan and risks discussed with patient.

## 2023-01-02 NOTE — ANESTHESIA TIME REPORT
Anesthesia Start and Stop Event Times     Date Time Event    1/2/2023 0718 Ready for Procedure     0928 Anesthesia Start     1042 Anesthesia Stop        Responsible Staff  01/02/23    Name Role Begin End    Min XIOMARA Vaughn M.D. Anesth 0928 1042        Overtime Reason:  no overtime (within assigned shift)    Comments:

## 2023-01-02 NOTE — OP REPORT
DATE OF SERVICE:  2023     PREOPERATIVE DIAGNOSES:  1.  A 42-year-old  at 39 weeks 3 days.  2.  History of previous , desires repeat .  3.  Advanced maternal age.  4.  GBS negative.  5.  Unstable lie.     POSTOPERATIVE DIAGNOSES:  1.  A 42-year-old  at 39 weeks 3 days.  2.  History of previous , desires repeat .  3.  Advanced maternal age.  4.  GBS negative.  5.  Cephalic lie.     PROCEDURE:  Repeat low transverse  section.     SURGEON:  La Lacey MD     ASSISTANT:  Shorty Houston MD     ANESTHESIOLOGIST:  Bob Vaughn MD     TYPE OF ANESTHESIA:  Spinal.     SPECIMENS:  Cord gases.     ESTIMATED BLOOD LOSS:  1000 mL.     FINDINGS:  Female infant, vertex presentation with clear amniotic fluid.    Anterior uterine wall was very thick, bladder was quite adherent to the lower   uterine segment.  Normal adnexa bilaterally.     COMPLICATIONS:  None.     DESCRIPTION OF PROCEDURE:  After informed consent was obtained, the patient   was taken to the operating room where she was given spinal anesthesia.  She   was then placed in supine position with left lateral tilt.  She was sterilely   prepped and draped.  A Pfannenstiel skin incision was made and carried down   both sharply with Bovie cautery to the fascia.  The fascia was nicked in the   midline and fascial incision was extended bilaterally using Fraga scissors.    The fascia was then dissected both superior and inferiorly from the rectus   muscle bellies.  Rectus muscle bellies were  bluntly. Peritoneum was   identified, elevated and incised and entrance into the abdominal cavity was   obtained.  The rest of peritoneum was then taken down ensuring no injury to   bowel or bladder.  Rick O retractor was placed, initially tried to get a   bladder flap, but the bladder was quite adherent to the lower uterine segment   and so this was abandoned.  A low transverse uterine incision was then made.    The  anterior uterine wall was very thick and ultimately got in bluntly.  The   uterine incision was extended bilaterally using bandage scissors.  Membranes   were ruptured and fetal head was elevated and delivered through the uterine   incision.  Mouth and nares were bulb suctioned.  Baby had excellent tone and   started crying.  Cord was doubly clamped and cut at 30 seconds and baby was   handed over to the waiting nursing staff.  Placenta then delivered via manual   massage.  Uterus was wiped clean with a laparotomy sponge.  Uterine incision   was then closed in a running locked fashion using 0 Vicryl.  A second   imbricating layer was also placed to obtain good hemostasis.  There was some   generalized oozing from the serosal edges.  These were Bovie cauterized and   ultimately required placement of Surgicel powder.  Once we felt we had good   hemostasis and Surgicel was then placed, Rick O retractor was removed and   the peritoneum was closed in a running fashion using 3-0 Vicryl.  Subfascial   tissues were irrigated, inspected and there required some Bovie cauterization   of some small oozing areas along the muscle edges.  The fascia was then closed   in a running fashion using 0 Vicryl.  Subcutaneous tissues were irrigated and   closed in a running fashion using 3-0 Vicryl.  Skin was closed in   subcuticular fashion using 4-0 Monocryl.  Sponge and needle counts were   correct.  There were no complications.        ______________________________  MD RACHEL Paulino/MILIND/JOSE    DD:  01/02/2023 10:53  DT:  01/02/2023 11:16    Job#:  [id number]

## 2023-01-03 PROCEDURE — A9270 NON-COVERED ITEM OR SERVICE: HCPCS | Performed by: OBSTETRICS & GYNECOLOGY

## 2023-01-03 PROCEDURE — A9270 NON-COVERED ITEM OR SERVICE: HCPCS | Performed by: STUDENT IN AN ORGANIZED HEALTH CARE EDUCATION/TRAINING PROGRAM

## 2023-01-03 PROCEDURE — 700111 HCHG RX REV CODE 636 W/ 250 OVERRIDE (IP): Performed by: STUDENT IN AN ORGANIZED HEALTH CARE EDUCATION/TRAINING PROGRAM

## 2023-01-03 PROCEDURE — 770002 HCHG ROOM/CARE - OB PRIVATE (112)

## 2023-01-03 PROCEDURE — 700102 HCHG RX REV CODE 250 W/ 637 OVERRIDE(OP): Performed by: OBSTETRICS & GYNECOLOGY

## 2023-01-03 PROCEDURE — 700102 HCHG RX REV CODE 250 W/ 637 OVERRIDE(OP): Performed by: STUDENT IN AN ORGANIZED HEALTH CARE EDUCATION/TRAINING PROGRAM

## 2023-01-03 RX ADMIN — ACETAMINOPHEN 1000 MG: 500 TABLET ORAL at 13:22

## 2023-01-03 RX ADMIN — KETOROLAC TROMETHAMINE 30 MG: 30 INJECTION, SOLUTION INTRAMUSCULAR; INTRAVENOUS at 10:50

## 2023-01-03 RX ADMIN — SIMETHICONE 125 MG: 125 TABLET, CHEWABLE ORAL at 02:36

## 2023-01-03 RX ADMIN — ACETAMINOPHEN 1000 MG: 500 TABLET ORAL at 00:14

## 2023-01-03 RX ADMIN — KETOROLAC TROMETHAMINE 30 MG: 30 INJECTION, SOLUTION INTRAMUSCULAR; INTRAVENOUS at 04:12

## 2023-01-03 RX ADMIN — SIMETHICONE 125 MG: 125 TABLET, CHEWABLE ORAL at 19:34

## 2023-01-03 RX ADMIN — ACETAMINOPHEN 1000 MG: 500 TABLET ORAL at 06:42

## 2023-01-03 RX ADMIN — ACETAMINOPHEN 1000 MG: 500 TABLET ORAL at 18:00

## 2023-01-03 RX ADMIN — ACETAMINOPHEN 1000 MG: 500 TABLET ORAL at 23:57

## 2023-01-03 RX ADMIN — IBUPROFEN 800 MG: 800 TABLET, FILM COATED ORAL at 17:50

## 2023-01-03 RX ADMIN — SIMETHICONE 125 MG: 125 TABLET, CHEWABLE ORAL at 08:07

## 2023-01-03 ASSESSMENT — PAIN DESCRIPTION - PAIN TYPE: TYPE: ACUTE PAIN

## 2023-01-03 NOTE — PROGRESS NOTES
Assumed care of patient at change of shift. Discussed POC with patient and answered all questions.    1800- Instructed patient on the use of the electric breast pump. Settings: speed 80 x 2 minutes, then 60 x 13 minutes; suction 20-30%. Pump for a total of 15 minutes every 3 hours. Instructed patient on cleaning pump parts and provided soap, and sponges.

## 2023-01-03 NOTE — PROGRESS NOTES
Received report from Verena LAINEZ. Verified pt and baby bands. Assumed care of pt and . Assessment complete. VSS stable. Pt and FOB oriented to postpartum room and plan. Call light within reach. All questions answered at this time.

## 2023-01-03 NOTE — PROGRESS NOTES
Called Dr. Lacey about pt requesting zofran due to nausea. Dr. Lacey stated to move her order to start today instead of tomorrow.

## 2023-01-03 NOTE — CARE PLAN
The patient is Stable - Low risk of patient condition declining or worsening    Shift Goals  Clinical Goals: Pain management, normal lochia, bond with baby    Progress made toward(s) clinical / shift goals:  Patient reports adequate pain management, she makes her needs known and will request medications if needed. Lochia is normal, reviewed expectations and changes,    Patient is not progressing towards the following goals:

## 2023-01-03 NOTE — CARE PLAN
The patient is Stable - Low risk of patient condition declining or worsening    Shift Goals  Clinical Goals: pain control    Progress made toward(s) clinical / shift goals:  Pain well controlled with current medications.     Patient is not progressing towards the following goals:

## 2023-01-03 NOTE — PROGRESS NOTES
Obstetrics  Section Postpartum Progress Note    Events  No acute events     Subjective:  Doing well. Reports pain is controlled. Lochia minimal. Ambulating. Voiding without difficulty. + Flatus. No bowel movement. Denies headaches or changes in vision. Breastfeeding.     PHYSICAL EXAM:  Vitals:   Vitals:    23 2200 23 2300 23 0000 23 0200   BP: 94/50   92/55   Pulse: 75 69 77 69   Resp: 18 18 18 18   Temp: 36.3 °C (97.3 °F)   36.3 °C (97.4 °F)   TempSrc: Temporal   Temporal   SpO2: 94% 95% 96% 95%   Weight:       Height:          General: Alert, conversational, pleasant, no acute distress.  CVS: Regular rate.  PULM: No respiratory distress, symmetric expansion.  ABD: Soft, non-tender, non-distended, fundus firm, non-tender, below the umbilicus. Incision dressing clean and dry. No surrounding erythema or drainage.   : Deferred  Extremities: Moves all, trace edema.     Labs Reviewed and Significant for:  Recent Labs     23  0623   WBC 8.1 10.5   RBC 3.29* 2.83*   HEMOGLOBIN 11.5* 9.9*   HEMATOCRIT 33.9* 29.3*   .0* 103.5*   MCH 35.0* 35.0*   RDW 54.3* 54.2*   PLATELETCT 160* 160*   MPV 9.9 9.9   NEUTSPOLYS 67.00  --    LYMPHOCYTES 20.40*  --    MONOCYTES 9.70  --    EOSINOPHILS 1.50  --    BASOPHILS 0.40  --         Assessment and Plan:    Pauline Sharp is a 42 y.o.  postop day 1 s/p , Low Transverse  at 39w3d, EBL 1000 cc    ASSESSMENT:  Postpartum state  Postop anemia    PLAN:  - Continue routine postpartum care.   -     La Lacey M.D.

## 2023-01-03 NOTE — PROGRESS NOTES
Ambulated pt to bathroom. Pt tolerated ambulation well. Pt reports no dizziness or nausea at this time.

## 2023-01-04 ENCOUNTER — PHARMACY VISIT (OUTPATIENT)
Dept: PHARMACY | Facility: MEDICAL CENTER | Age: 43
End: 2023-01-04
Payer: COMMERCIAL

## 2023-01-04 VITALS
HEART RATE: 73 BPM | BODY MASS INDEX: 29.45 KG/M2 | TEMPERATURE: 98.1 F | RESPIRATION RATE: 16 BRPM | HEIGHT: 60 IN | OXYGEN SATURATION: 97 % | SYSTOLIC BLOOD PRESSURE: 92 MMHG | WEIGHT: 150 LBS | DIASTOLIC BLOOD PRESSURE: 55 MMHG

## 2023-01-04 PROCEDURE — 700102 HCHG RX REV CODE 250 W/ 637 OVERRIDE(OP): Performed by: OBSTETRICS & GYNECOLOGY

## 2023-01-04 PROCEDURE — RXOTC WILLOW AMBULATORY OTC CHARGE: Performed by: PHARMACIST

## 2023-01-04 PROCEDURE — A9270 NON-COVERED ITEM OR SERVICE: HCPCS | Performed by: OBSTETRICS & GYNECOLOGY

## 2023-01-04 PROCEDURE — RXMED WILLOW AMBULATORY MEDICATION CHARGE: Performed by: ADVANCED PRACTICE MIDWIFE

## 2023-01-04 RX ORDER — PSEUDOEPHEDRINE HCL 30 MG
100 TABLET ORAL 2 TIMES DAILY PRN
Qty: 60 CAPSULE | Refills: 1 | Status: SHIPPED | OUTPATIENT
Start: 2023-01-04 | End: 2024-01-11

## 2023-01-04 RX ORDER — ACETAMINOPHEN 500 MG
1000 TABLET ORAL EVERY 6 HOURS
Qty: 30 TABLET | Refills: 0 | Status: SHIPPED | OUTPATIENT
Start: 2023-01-04

## 2023-01-04 RX ORDER — FERROUS SULFATE 325(65) MG
325 TABLET ORAL DAILY
Qty: 30 TABLET | Refills: 4 | Status: SHIPPED | OUTPATIENT
Start: 2023-01-04 | End: 2024-01-11

## 2023-01-04 RX ORDER — SIMETHICONE 125 MG
125 TABLET,CHEWABLE ORAL 4 TIMES DAILY PRN
Qty: 120 TABLET | Refills: 1 | Status: SHIPPED | OUTPATIENT
Start: 2023-01-04 | End: 2023-05-20

## 2023-01-04 RX ORDER — IBUPROFEN 800 MG/1
800 TABLET ORAL EVERY 8 HOURS
Qty: 60 TABLET | Refills: 1 | Status: ON HOLD | OUTPATIENT
Start: 2023-01-04 | End: 2024-01-19

## 2023-01-04 RX ADMIN — IBUPROFEN 800 MG: 800 TABLET, FILM COATED ORAL at 10:24

## 2023-01-04 RX ADMIN — SIMETHICONE 125 MG: 125 TABLET, CHEWABLE ORAL at 11:06

## 2023-01-04 RX ADMIN — ACETAMINOPHEN 1000 MG: 500 TABLET ORAL at 12:00

## 2023-01-04 ASSESSMENT — EDINBURGH POSTNATAL DEPRESSION SCALE (EPDS)
I HAVE LOOKED FORWARD WITH ENJOYMENT TO THINGS: AS MUCH AS I EVER DID
I HAVE FELT SAD OR MISERABLE: NO, NOT AT ALL
I HAVE BEEN SO UNHAPPY THAT I HAVE BEEN CRYING: NO, NEVER
I HAVE FELT SCARED OR PANICKY FOR NO GOOD REASON: NO, NOT AT ALL
I HAVE BEEN ABLE TO LAUGH AND SEE THE FUNNY SIDE OF THINGS: AS MUCH AS I ALWAYS COULD
THINGS HAVE BEEN GETTING ON TOP OF ME: NO, MOST OF THE TIME I HAVE COPED QUITE WELL
I HAVE BLAMED MYSELF UNNECESSARILY WHEN THINGS WENT WRONG: YES, SOME OF THE TIME
I HAVE BEEN ANXIOUS OR WORRIED FOR NO GOOD REASON: HARDLY EVER
THE THOUGHT OF HARMING MYSELF HAS OCCURRED TO ME: NEVER
I HAVE BEEN SO UNHAPPY THAT I HAVE HAD DIFFICULTY SLEEPING: NOT AT ALL

## 2023-01-04 ASSESSMENT — PAIN DESCRIPTION - PAIN TYPE: TYPE: SURGICAL PAIN

## 2023-01-04 NOTE — CARE PLAN
The patient is Stable - Low risk of patient condition declining or worsening    Shift Goals  Clinical Goals: pain control ,passing flatus    Progress made toward(s) clinical / shift goals:  vss passing flatus. Ambulating without difficulty,     Patient is not progressing towards the following goals:

## 2023-01-04 NOTE — LACTATION NOTE
This note was copied from a baby's chart.  Lactation follow up : 43 y/o P2 , hx difficulty breastfeeding first baby. LC came to room and mom was attempting to latch baby. LC assisted for several minutes and suggested NS placement . A 20 mm NS was placed and  instructions were given for use and cleaning. Baby latched but was choppy with suckle pattern and needed gentle stimulation to suck.  LC then placed a 24 mm NS and baby was able to exhibit a smoother jaw glide but still needed stimulation to suckle and did not self start. LC gave assistance with hold.   Baby was able to pull colostrum into NS but did not have a regular pattern without stimulation. LC assisted with hand expression. Mom has been giving spoons of colostrum since birth.. Baby is now 29 hours old and has yet to feed effectively. MARIETTA discussed 3 step feeding plan with mom and FOB. Mom will need to start pumping due to using the NS and baby will be supplemented. DBM was suggested and parents were agreeable. LC reviewed feeding plan with bedside RN and she will initiate pumping and supplementation with paced bottle feeding. MARIETTA briefly discussed the 3 step  feeding plan with the parents and its rationale. If baby improves with feedings over night then supplementation may be discontinued and pumping to remain in place as long as nipple shield is being use.   Baby was placed back STS with mom in safe upright position on chest.  MARIETTA gave parent the [place] [facility] hand expression video web site to review.   Lactation to follow up in the morning

## 2023-01-04 NOTE — CARE PLAN
The patient is Stable - Low risk of patient condition declining or worsening    Shift Goals  Clinical Goals: Pain management    Progress made toward(s) clinical / shift goals:  Patient reports adequate pain management, she is ambulating, performing all ADLs independently.    Patient is not progressing towards the following goals:

## 2023-01-04 NOTE — DISCHARGE INSTRUCTIONS
PATIENT DISCHARGE EDUCATION INSTRUCTION SHEET  REASONS TO CALL YOUR PEDIATRICIAN  Projectile or forceful vomiting for more than one feeding  Unusual rash lasting more than 24 hours  Very sleepy, difficult to wake up  Bright yellow or pumpkin colored skin with extreme sleepiness  Temperature below 97.6 or above 100.4 F rectally  Feeding problems  Breathing problems  Excessive crying with no known cause  If cord starts to become red, swollen, develops a smell or discharge  No wet diaper or stool in a 24 hour time period     REASONS TO CALL YOUR OBSTETRICIAN  Persistent fever, shaking, chills (Temperature higher than 100.4) may indicate you have an infection  Heavy bleeding: soaking more than 1 pad per hour; Passing clots an egg-sized clot or bigger may mean you have an postpartum hemorrhage  Foul odor from vagina or bad smelling or discolored discharge or blood  Breast infection (Mastitis symptoms); breast pain, chills, fever, redness or red streaks, may feel flu like symptoms  Urinary pain, burning or frequency  Incision that is not healing, increased redness, swelling, tenderness or pain, or any pus from episiotomy or  site may mean you have an infection  Redness, swelling, warmth, or painful to touch in the calf area of your leg may mean you have a blood clot  Severe or intensified depression, thoughts or feelings of wanting to hurt yourself or someone else   Pain in chest, obstructed breathing or shortness of breath (trouble catching your breath) may mean you are having a postpartum complication. Call your provider immediately   Headache that does not get better, even after taking medicine, a bad headache with vision changes or pain in the upper right area of your belly may mean you have high blood pressure or post birth preeclampsia. Call your provider immediately    SAFE SLEEP POSITIONING FOR YOUR BABY  The [employer] advises your baby should be placed on his/her back for Sleeping  to reduce the risk of Sudden Infant Death Syndrome (SIDS)  Baby should sleep by themselves in a crib, portable crib or bassinet  Baby should not share a bed with his/her parents  Baby should be placed on his or her back to sleep, night time and at naps  Baby should sleep on firm mattress with a tightly fitted sheet  NO couches, waterbeds or anything soft  Baby's sleep area should not contain any loose blankets, comforters, stuffed animals or any other soft items, (pillows, bumper pads, etc. ...)  Baby's face should be kept uncovered at all times  Baby should sleep in a smoke-free environment  Do not dress baby too warmly to prevent overheating    HAND WASHING  All family and friends should wash their hands:  Before and after holding the baby  Before feeding the baby  After using the restroom or changing the baby's diaper     CARE    TAKING BABY'S TEMPERATURE  If you feel your baby may have a fever take your baby's temperature per thermometer instructions  If taking axillary temperature place thermometer under baby's armpit and hold arm close to body  The most precise and accurate way to take a temperature is rectally  Turn on the digital thermometer and lubricate the tip of the thermometer with petroleum jelly.  Lay your baby or child on his or her back, lift his or her thighs, and insert the lubricated thermometer 1/2 to 1 inch (1.3 to 2.5 centimeters) into the rectum  Call your Pediatrician for temperature lower than 97.6 or greater than 100.4 F rectally    BATHE AND SHAMPOO BABY  Gently wash baby with a soft cloth using warm water and mild soap - rinse well  Do not put baby in tub bath until umbilical cord falls off and appears well-healed  Bathing baby 2-3 times a week might be enough until your baby becomes more mobile. Bathing your baby too much can dry out his or her skin     NAIL CARE  First recommendation is to keep them covered to prevent facial scratching  During the first few weeks,  nails  are very soft. Doctors recommend using only a fine emery board. Don't bite or tear your baby's nails. When your baby's nails are stronger, after a few weeks, you can switch to clippers or scissors making sure not to cut too short and nip the quick   A good time for nail care is while your baby is sleeping and moving less    CORD CARE  Fold diaper below umbilical cord until cord falls off  Keep umbilical cord clean and dry  May see a small amount of crust around the base of the cord. Clean off with mild soap and water and dry             DIAPER AND DRESS BABY  For baby girls: gently wipe from front to back. Mucous or pink tinged drainage is normal  For uncircumcised baby boys: do NOT pull back the foreskin to clean the penis. Gently clean with wipes or warm, soapy water  Dress baby in one more layer of clothing than you are wearing  Use a hat to protect from sun or cold. NO ties or drawstrings    URINATION AND BOWEL MOVEMENTS  If formula feeding or when breast milk feeding is established, your baby should wet 6-8 diapers a day and have at least 2 bowel movements a day during the first month  Bowel movements color and type can vary from day to day    CIRCUMCISION  What to watch out for:  Foul smelling discharge  Fever  Swelling   Crusty, fluid filled sores  Trouble urinating   Persistent bleeding or more than a quarter size spot of blood on his diaper  Yellow discharge lasting more than a week  Continue with care procedures until healed or have a visit with your Pediatrician     INFANT FEEDING  Most newborns feed 8-12 times, every 24 hours. YOU MAY NEED TO WAKE YOUR BABY UP TO FEED  If breastfeeding, offer both breasts when your baby is showing feeding cues, such as rooting or bringing hand to mouth and sucking  Common for  babies to feed every 1-3 hours   Only allow baby to sleep up to 4 hours in between feeds if baby is feeding well at each feed. Offer breast anytime baby is showing feeding cues and at  least every 3 hours  Follow up with outpatient [facility] for continued breast feeding support    FORMULA FEEDING  Feed baby formula every 2-3 hours when your baby is showing feeding cues  Paced bottle feeding will help baby not over eat at each feed     BOTTLE FEEDING   Paced Bottle Feeding is a method of bottle feeding that allows the infant to be more in control of the feeding pace. This feeding method slows down the flow of milk into the nipple and the mouth, allowing the baby to eat more slowly, and take breaks. Paced feeding reduces the risk of overfeeding that may result in discomfort for the baby   Hold baby almost upright or slightly reclined position supporting the head and neck  Use a small nipple for slow-flowing. Slow flow nipple holes help in controlling flow   Don't force the bottle's nipple into your baby's mouth. Tickle babies lip so baby opens their mouth  Insert nipple and hold the bottle flat  Let the baby suck three to four times without milk then tip the bottle just enough to fill the nipple about FCI with milk  Let baby suck 3-5 continuous swallows, about 20-30 seconds tip the bottle down to give the baby a break  After a few seconds, when the baby begins to suck again, tip bottle up to allow milk to flow into the nipple  Continue to Pace feed until baby shows signs of fullness; no longer sucking after a break, turning away or pushing away the nipple   Bottle propping is not a recommended practice for feeding  Bottle propping is when you give a baby a bottle by leaning the bottle against a pillow, or other support, rather than holding the baby and the bottle.  Forces your baby to keep up with the flow, even if the baby is full   This can increase your baby's risk of choking, ear infections, and tooth decay    BOTTLE PREPARATION   Never feed  formula to your baby, or use formula if the container is dented  When using ready-to-feed, shake formula containers before  "opening  If formula is in a can, clean the lid of any dust, and be sure the can opener is clean  Formula does not need to be warmed. If you choose to feed warmed formula, do not microwave it. This can cause \"hot spots\" that could burn your baby. Instead, set the filled bottle in a bowl of warm (not boiling) water or hold the bottle under warm tap water. Sprinkle a few drops of formula on the inside of your wrist to make sure it's not too hot  Measure and pour desired amount of water into baby bottle  Add unpacked, level scoop(s) of powder to the bottle as directed on formula container. Return dry scoop to can  Put the cap on the bottle and shake. Move your wrist in a twisting motion helps powder formula mix more quickly and more thoroughly  Feed or store immediately in refrigerator  You need to sterilize bottles, nipples, rings, etc., only before the first use    CLEANING BOTTLE  Use hot, soapy water  Rinse the bottles and attachments separately and clean with a bottle brush  If your bottles are labelled  safe, you can alternatively go ahead and wash them in the    After washing, rinse the bottle parts thoroughly in hot running water to remove any bubbles or soap residue   Place the parts on a bottle drying rack   Make sure the bottles are left to drain in a well-ventilated location to ensure that they dry thoroughly  CAR SEAT  For your baby's safety and to comply with [facility] Law you will need to bring a car seat to the hospital before taking your baby home. Please read your car seat instructions before your baby's discharge from the hospital.  Make sure you place an emergency contact sticker on your baby's car seat with your baby's identifying information  Car seat should not be placed in the front seat of a vehicle. The car seat should be placed in the back seat in the rear-facing position.  Car seat information is available through [employer] at [phone] and also at " [email or web address]    MATERNAL CARE     WOUND CARE  Ask your physician for additional care instructions. In general:   Incision:  May shower and pat incision dry   Keep the incision clean and dry  There should not be any opening or pus from the incision  Continue to walk at home 3 times a day   Do NOT lift anything heavier than your baby (over 10 pounds)  Encourage family to help participate in care of the  to allow rest and mom time to heal    Episiotomy/Laceration  May use janette-spray bottle, witch hazel pads and dermaplast spray for comfort  Use janette-spray bottle after urinating to cleanse perineal area  To prevent burning during urination spray janette-water bottle on labial area   Pat perineal area dry until episiotomy/laceration is healed  Continue to use janette-bottle until bleeding stops as needed  If have a 2nd degree laceration or greater, a Sitz bath can offer relief from soreness, burning, and inflammation   Sitz Bath   Sit in 6 inches of warm water and soak laceration as needed until the laceration heals    VAGINAL CARE AND BLEEDING  Nothing inside vagina for 6 weeks:   No sexual intercourse, tampons or douching  Bleeding may continue for 2-4 weeks. Amount and color may vary  Soaking 1 pad or more in an hour for several hours is considered heavy bleeding  Passing large egg sized blood clots can be concerning  If you feel like you have heavy bleeding or are having increasing amount of blood clots call your Obstetrician immediately  If you begin feeling faint upon standing, feeling sick to your stomach, have clammy skin, a really fast heartbeat, have chills, start feeling confused, dizzy, sleepy or weak, or feeling like you're going to faint call your Obstetrician immediately    HYPERTENSION   Preeclampsia or gestational hypertension are types of high blood pressure that only pregnant women can get. It is important for you to be aware of symptoms to seek early intervention and treatment. If you  "have any of these symptoms immediately call your Obstetrician    Vision changes or blurred vision   Severe headache or pain that is unrelieved with medication and will not go away  Persistent pain in upper abdomen or shoulder   Increased swelling of face, feet, or hands  Difficulty breathing or shortness of breath at rest  Urinating less than usual    URINATION AND BOWEL MOVEMENTS  Eating more fiber (bran cereal, fruits, and vegetables) and drinking plenty of fluids will help to avoid constipation  Urinary frequency and urgency after childbirth is normal  If you experience any urinary pain, burning or frequency call your provider    BIRTH CONTROL  It is possible to become pregnant at any time after delivery and while breastfeeding  Plan to discuss a method of birth control with your physician at your post-delivery follow up visit    POSTPARTUM BLUES  During the first few days after birth, you may experience a sense of the \"blues\" which may include impatience, irritability or even crying. These feelings come and go quickly. However, as many as 1 in 10 women experience emotional symptoms known as postpartum depression.     POSTPARTUM DEPRESSION    May start as early as the second or third day after delivery or take several weeks or months to develop. Symptoms of \"blues\" are present, but are more intense: Crying spells; loss of appetite; feelings of hopelessness or loss of control; fear of touching the baby; over concern or no concern at all about the baby; little or no concern about your own appearance/caring for yourself; and/or inability to sleep or excessive sleeping. Contact your Obstetrician if you are experiencing any of these symptoms     PREVENTING SHAKEN BABY  If you are angry or stressed, PUT THE BABY IN THE CRIB, step away, take some deep breaths, and wait until you are calm to care for the baby. DO NOT SHAKE THE BABY. You are not alone, call a supporter for help.  [facility] 24/7 crisis call line " "([phone]) or (1-597.596.5884)  You can also text them, text \"ANSWER\" (720293)      "

## 2023-01-04 NOTE — PROGRESS NOTES
Discharged home with baby via wheelchair. Isntructions given on inant care and safety and self care and reasons to call the

## 2023-01-04 NOTE — DISCHARGE SUMMARY
Discharge Summary:      Pauline Sharp    Admit Date:   2023  Discharge Date:  2023     Admitting diagnosis:  Indication for care in labor or delivery [O75.9]  Discharge Diagnosis: Status post  for repeat and unstable lie.  Pregnancy Complications: Hx Covid in pregnancy,   Tubal Ligation:  no        History:  Past Medical History:   Diagnosis Date    Blood in stool 10/13/2014    Pregnant 23     OB History    Para Term  AB Living   3 2 2 0 0 2   SAB IAB Ectopic Molar Multiple Live Births   0 0 0   0 2      # Outcome Date GA Lbr Joey/2nd Weight Sex Delivery Anes PTL Lv   3 Term 23 39w3d  2.76 kg (6 lb 1.4 oz) F CS-LTranv Spinal N MAGGIE   2 Term 02/10/21 39w6d  3.28 kg (7 lb 3.7 oz) M CS-LTranv EPI  MAGGIE      Complications: Fetal Intolerance   1                  Patient has no known allergies.  Patient Active Problem List    Diagnosis Date Noted    Indication for care in labor or delivery 2023    Problem 2021    Lactation problem, prenatal consultation 2020    Thumb pain, right 08/10/2018    Other constipation 2016    Health care maintenance 2015    Numerous moles 10/13/2014    Blood in stool 10/13/2014        Hospital Course:   42 y.o. , now para 2, was admitted with the above mentioned diagnosis, underwent Repeat  repeat, unstable lie ,  for repeat, unstable lie. Patient postpartum course was unremarkable, with progressive advancement in diet , ambulation and toleration of oral analgesia. Patient without complaints today and desires discharge.      Vitals:    23 1005 23 1231 23 2000 23 0600   BP: (!) 83/52 105/57 102/66 92/55   Pulse: 84  86 73   Resp: 20  18 16   Temp: 36.7 °C (98 °F)  36.1 °C (97 °F) 36.7 °C (98.1 °F)   TempSrc: Temporal  Temporal Temporal   SpO2: 96%  98% 97%   Weight:       Height:           Current Facility-Administered Medications   Medication Dose    lactated ringers  (LR) infusion      oxytocin (Pitocin) infusion (for postpartum)  125 mL/hr    oxytocin (PITOCIN) injection 10 Units  10 Units    lactated ringers infusion      ibuprofen (MOTRIN) tablet 800 mg  800 mg    Followed by    [START ON 1/6/2023] ibuprofen (MOTRIN) tablet 800 mg  800 mg    acetaminophen (TYLENOL) tablet 1,000 mg  1,000 mg    Followed by    [START ON 1/6/2023] acetaminophen (TYLENOL) tablet 1,000 mg  1,000 mg    oxyCODONE immediate-release (ROXICODONE) tablet 5 mg  5 mg    oxyCODONE immediate release (ROXICODONE) tablet 10 mg  10 mg    diphenhydrAMINE (BENADRYL) tablet/capsule 25 mg  25 mg    Or    diphenhydrAMINE (BENADRYL) injection 25 mg  25 mg    docusate sodium (COLACE) capsule 100 mg  100 mg    simethicone (Mylicon) chewable tablet 125 mg  125 mg    calcium carbonate (Tums) chewable tab 1,000 mg  1,000 mg    ondansetron (ZOFRAN) syringe/vial injection 4 mg  4 mg    Or    ondansetron (ZOFRAN ODT) dispertab 4 mg  4 mg       Exam:  Breast Exam: negative  Abdomen: Abdomen soft, non-tender. BS normal. No masses,  No organomegaly  Fundus Non Tender: yes  Incision:  bandage clean and dry, nontender, no signs of erythema  Perineum: perineum intact  Extremity: extremities, peripheral pulses and reflexes normal, Homans sign is negative, no sign of DVT     Labs:  Recent Labs     01/02/23  0620 01/02/23  2050   WBC 8.1 10.5   RBC 3.29* 2.83*   HEMOGLOBIN 11.5* 9.9*   HEMATOCRIT 33.9* 29.3*   .0* 103.5*   MCH 35.0* 35.0*   MCHC 33.9 33.8   RDW 54.3* 54.2*   PLATELETCT 160* 160*   MPV 9.9 9.9        Activity:   Discharge to home  Pelvic Rest x 6 weeks    Assessment:  normal postpartum course  Post op anemia - mild  Discharge Assessment: Normal postpartum care     Follow up: Return to office x 1 week for incision check, then at 6 wks for PP appt.    Discharge Meds:   Current Outpatient Medications   Medication Sig Dispense Refill    acetaminophen (TYLENOL) 500 MG Tab Take 2 Tablets by mouth every 6 hours. 30  Tablet 0    docusate sodium 100 MG Cap Take 100 mg by mouth 2 times a day as needed for Constipation. 60 Capsule 1    ibuprofen (MOTRIN) 800 MG Tab Take 1 Tablet by mouth every 8 hours. Indications: Joint Damage causing Pain and Loss of Function 60 Tablet 1    simethicone (MYLICON) 125 MG chewable tablet Chew 1 Tablet 4 times a day as needed for Flatulence. 120 Tablet 1    ferrous sulfate 325 (65 Fe) MG tablet Take 1 Tablet by mouth every day. 30 Tablet 4       Kimberly Wen C.N.M.

## 2023-01-04 NOTE — PROGRESS NOTES
S:  Pt doing well, no c/o, lochia small, pain controlled, passing gas, voiding since negrete removed, baby doing well, breastfeeding being established    O:  VSS AF        Gen - NAD        Lung - normal effort, no distress        Abd - approp mild TTP, no peritoneal signs, wound= C/D/I, no s/s infection        Ext - No s/s DVT, nontender         Recent Labs     23  0620 23   WBC 8.1 10.5   RBC 3.29* 2.83*   HEMOGLOBIN 11.5* 9.9*   HEMATOCRIT 33.9* 29.3*   .0* 103.5*   MCH 35.0* 35.0*   RDW 54.3* 54.2*   PLATELETCT 160* 160*   MPV 9.9 9.9   NEUTSPOLYS 67.00  --    LYMPHOCYTES 20.40*  --    MONOCYTES 9.70  --    EOSINOPHILS 1.50  --    BASOPHILS 0.40  --         A:  POD#2 s/p repeat  - doing well postpartum  Breastfeeding   Post op anemia   Desires d/c home today         P:  Continue routine post partum care, ambulation encouraged, may shower today, d/c home  Reviewed postpartum pathway and warning signs including those for: bleeding, infection, postpartum depression, preeclampsia  Start iron supplement, reviewed strategies to increase absorption and prevent constipation,  Reviewed breastfeeding resources  Return to office x 1 week for incision check  Call with concerns    Kimberly Wen C.N.M.

## 2023-01-06 ENCOUNTER — TELEPHONE (OUTPATIENT)
Dept: OBGYN | Facility: CLINIC | Age: 43
End: 2023-01-06
Payer: COMMERCIAL

## 2023-01-24 ENCOUNTER — HOSPITAL ENCOUNTER (OUTPATIENT)
Dept: LAB | Facility: MEDICAL CENTER | Age: 43
End: 2023-01-24
Attending: OBSTETRICS & GYNECOLOGY
Payer: COMMERCIAL

## 2023-01-24 LAB — TSH SERPL DL<=0.005 MIU/L-ACNC: 0.27 UIU/ML (ref 0.38–5.33)

## 2023-01-24 PROCEDURE — 84439 ASSAY OF FREE THYROXINE: CPT

## 2023-01-24 PROCEDURE — 36415 COLL VENOUS BLD VENIPUNCTURE: CPT

## 2023-01-24 PROCEDURE — 84443 ASSAY THYROID STIM HORMONE: CPT

## 2023-01-25 LAB — T4 FREE SERPL-MCNC: 1.32 NG/DL (ref 0.93–1.7)

## 2023-01-26 ENCOUNTER — HOSPITAL ENCOUNTER (OUTPATIENT)
Facility: MEDICAL CENTER | Age: 43
End: 2023-01-26
Attending: OBSTETRICS & GYNECOLOGY
Payer: COMMERCIAL

## 2023-01-26 LAB
BASOPHILS # BLD AUTO: 0.6 % (ref 0–1.8)
BASOPHILS # BLD: 0.04 K/UL (ref 0–0.12)
EOSINOPHIL # BLD AUTO: 0.19 K/UL (ref 0–0.51)
EOSINOPHIL NFR BLD: 3 % (ref 0–6.9)
ERYTHROCYTE [DISTWIDTH] IN BLOOD BY AUTOMATED COUNT: 46.9 FL (ref 35.9–50)
FERRITIN SERPL-MCNC: 52.7 NG/ML (ref 10–291)
HCT VFR BLD AUTO: 41.4 % (ref 37–47)
HGB BLD-MCNC: 13.4 G/DL (ref 12–16)
IMM GRANULOCYTES # BLD AUTO: 0.02 K/UL (ref 0–0.11)
IMM GRANULOCYTES NFR BLD AUTO: 0.3 % (ref 0–0.9)
IRON SATN MFR SERPL: 49 % (ref 15–55)
IRON SERPL-MCNC: 135 UG/DL (ref 40–170)
LYMPHOCYTES # BLD AUTO: 2.15 K/UL (ref 1–4.8)
LYMPHOCYTES NFR BLD: 34 % (ref 22–41)
MCH RBC QN AUTO: 33.8 PG (ref 27–33)
MCHC RBC AUTO-ENTMCNC: 32.4 G/DL (ref 33.6–35)
MCV RBC AUTO: 104.5 FL (ref 81.4–97.8)
MONOCYTES # BLD AUTO: 0.51 K/UL (ref 0–0.85)
MONOCYTES NFR BLD AUTO: 8.1 % (ref 0–13.4)
NEUTROPHILS # BLD AUTO: 3.41 K/UL (ref 2–7.15)
NEUTROPHILS NFR BLD: 54 % (ref 44–72)
NRBC # BLD AUTO: 0 K/UL
NRBC BLD-RTO: 0 /100 WBC
PLATELET # BLD AUTO: 253 K/UL (ref 164–446)
PMV BLD AUTO: 10.7 FL (ref 9–12.9)
RBC # BLD AUTO: 3.96 M/UL (ref 4.2–5.4)
TIBC SERPL-MCNC: 277 UG/DL (ref 250–450)
UIBC SERPL-MCNC: 142 UG/DL (ref 110–370)
WBC # BLD AUTO: 6.3 K/UL (ref 4.8–10.8)

## 2023-01-26 PROCEDURE — 82728 ASSAY OF FERRITIN: CPT

## 2023-01-26 PROCEDURE — 85025 COMPLETE CBC W/AUTO DIFF WBC: CPT

## 2023-01-26 PROCEDURE — 83540 ASSAY OF IRON: CPT

## 2023-01-26 PROCEDURE — 83550 IRON BINDING TEST: CPT

## 2023-02-13 ENCOUNTER — PHARMACY VISIT (OUTPATIENT)
Dept: PHARMACY | Facility: MEDICAL CENTER | Age: 43
End: 2023-02-13
Payer: COMMERCIAL

## 2023-02-13 ENCOUNTER — HOSPITAL ENCOUNTER (OUTPATIENT)
Dept: LAB | Facility: MEDICAL CENTER | Age: 43
End: 2023-02-13
Attending: OBSTETRICS & GYNECOLOGY
Payer: COMMERCIAL

## 2023-02-13 LAB
ERYTHROCYTE [DISTWIDTH] IN BLOOD BY AUTOMATED COUNT: 43.4 FL (ref 35.9–50)
HCT VFR BLD AUTO: 41.4 % (ref 37–47)
HGB BLD-MCNC: 13.9 G/DL (ref 12–16)
MCH RBC QN AUTO: 33.3 PG (ref 27–33)
MCHC RBC AUTO-ENTMCNC: 33.6 G/DL (ref 33.6–35)
MCV RBC AUTO: 99 FL (ref 81.4–97.8)
MORPHOLOGY BLD-IMP: NORMAL
PLATELET # BLD AUTO: 150 K/UL (ref 164–446)
PLATELET BLD QL SMEAR: NORMAL
PMV BLD AUTO: 11.3 FL (ref 9–12.9)
RBC # BLD AUTO: 4.18 M/UL (ref 4.2–5.4)
RBC BLD AUTO: NORMAL
TSH SERPL DL<=0.005 MIU/L-ACNC: 0.7 UIU/ML (ref 0.38–5.33)
VIT B12 SERPL-MCNC: 999 PG/ML (ref 211–911)
WBC # BLD AUTO: 6.5 K/UL (ref 4.8–10.8)

## 2023-02-13 PROCEDURE — 84443 ASSAY THYROID STIM HORMONE: CPT

## 2023-02-13 PROCEDURE — 36415 COLL VENOUS BLD VENIPUNCTURE: CPT

## 2023-02-13 PROCEDURE — 82607 VITAMIN B-12: CPT

## 2023-02-13 PROCEDURE — 85027 COMPLETE CBC AUTOMATED: CPT

## 2023-02-13 PROCEDURE — RXMED WILLOW AMBULATORY MEDICATION CHARGE: Performed by: ADVANCED PRACTICE MIDWIFE

## 2023-02-13 PROCEDURE — 84425 ASSAY OF VITAMIN B-1: CPT

## 2023-02-13 RX ORDER — ACETAMINOPHEN AND CODEINE PHOSPHATE 120; 12 MG/5ML; MG/5ML
SOLUTION ORAL
Qty: 84 TABLET | Refills: 3 | Status: SHIPPED | OUTPATIENT
Start: 2023-02-13 | End: 2023-05-20

## 2023-02-14 ENCOUNTER — APPOINTMENT (RX ONLY)
Dept: URBAN - METROPOLITAN AREA CLINIC 20 | Facility: CLINIC | Age: 43
Setting detail: DERMATOLOGY
End: 2023-02-14

## 2023-02-14 DIAGNOSIS — L57.8 OTHER SKIN CHANGES DUE TO CHRONIC EXPOSURE TO NONIONIZING RADIATION: ICD-10-CM

## 2023-02-14 DIAGNOSIS — L91.0 HYPERTROPHIC SCAR: ICD-10-CM

## 2023-02-14 DIAGNOSIS — D18.0 HEMANGIOMA: ICD-10-CM

## 2023-02-14 DIAGNOSIS — D22 MELANOCYTIC NEVI: ICD-10-CM

## 2023-02-14 DIAGNOSIS — L82.1 OTHER SEBORRHEIC KERATOSIS: ICD-10-CM

## 2023-02-14 DIAGNOSIS — L81.4 OTHER MELANIN HYPERPIGMENTATION: ICD-10-CM

## 2023-02-14 DIAGNOSIS — Z87.2 PERSONAL HISTORY OF DISEASES OF THE SKIN AND SUBCUTANEOUS TISSUE: ICD-10-CM

## 2023-02-14 PROBLEM — D22.61 MELANOCYTIC NEVI OF RIGHT UPPER LIMB, INCLUDING SHOULDER: Status: ACTIVE | Noted: 2023-02-14

## 2023-02-14 PROBLEM — D22.71 MELANOCYTIC NEVI OF RIGHT LOWER LIMB, INCLUDING HIP: Status: ACTIVE | Noted: 2023-02-14

## 2023-02-14 PROBLEM — D22.62 MELANOCYTIC NEVI OF LEFT UPPER LIMB, INCLUDING SHOULDER: Status: ACTIVE | Noted: 2023-02-14

## 2023-02-14 PROBLEM — D18.01 HEMANGIOMA OF SKIN AND SUBCUTANEOUS TISSUE: Status: ACTIVE | Noted: 2023-02-14

## 2023-02-14 PROBLEM — D22.72 MELANOCYTIC NEVI OF LEFT LOWER LIMB, INCLUDING HIP: Status: ACTIVE | Noted: 2023-02-14

## 2023-02-14 PROBLEM — D22.5 MELANOCYTIC NEVI OF TRUNK: Status: ACTIVE | Noted: 2023-02-14

## 2023-02-14 PROCEDURE — ? DIAGNOSIS COMMENT

## 2023-02-14 PROCEDURE — ? OBSERVATION AND MEASURE

## 2023-02-14 PROCEDURE — ? COUNSELING

## 2023-02-14 PROCEDURE — 99213 OFFICE O/P EST LOW 20 MIN: CPT

## 2023-02-14 ASSESSMENT — LOCATION SIMPLE DESCRIPTION DERM
LOCATION SIMPLE: RIGHT BUTTOCK
LOCATION SIMPLE: RIGHT THIGH
LOCATION SIMPLE: LEFT POSTERIOR THIGH
LOCATION SIMPLE: LEFT FOREARM
LOCATION SIMPLE: LEFT CHEEK
LOCATION SIMPLE: LEFT POSTERIOR UPPER ARM
LOCATION SIMPLE: RIGHT UPPER BACK
LOCATION SIMPLE: RIGHT UPPER ARM
LOCATION SIMPLE: RIGHT HAND
LOCATION SIMPLE: LEFT THIGH
LOCATION SIMPLE: RIGHT POSTERIOR UPPER ARM
LOCATION SIMPLE: RIGHT CHEEK
LOCATION SIMPLE: RIGHT FOREARM
LOCATION SIMPLE: CHEST
LOCATION SIMPLE: RIGHT POSTERIOR THIGH
LOCATION SIMPLE: ABDOMEN
LOCATION SIMPLE: LEFT LOWER BACK
LOCATION SIMPLE: LEFT UPPER BACK
LOCATION SIMPLE: LEFT UPPER ARM

## 2023-02-14 ASSESSMENT — LOCATION DETAILED DESCRIPTION DERM
LOCATION DETAILED: RIGHT ANTERIOR DISTAL THIGH
LOCATION DETAILED: LEFT ANTERIOR DISTAL THIGH
LOCATION DETAILED: PERIUMBILICAL SKIN
LOCATION DETAILED: LEFT ANTERIOR PROXIMAL UPPER ARM
LOCATION DETAILED: RIGHT MID-UPPER BACK
LOCATION DETAILED: RIGHT ANTERIOR PROXIMAL THIGH
LOCATION DETAILED: LEFT PROXIMAL DORSAL FOREARM
LOCATION DETAILED: LEFT DISTAL POSTERIOR THIGH
LOCATION DETAILED: RIGHT DISTAL POSTERIOR THIGH
LOCATION DETAILED: LEFT DISTAL POSTERIOR UPPER ARM
LOCATION DETAILED: LEFT MEDIAL SUPERIOR CHEST
LOCATION DETAILED: RIGHT ANTERIOR PROXIMAL UPPER ARM
LOCATION DETAILED: RIGHT INFERIOR UPPER BACK
LOCATION DETAILED: RIGHT DISTAL POSTERIOR UPPER ARM
LOCATION DETAILED: RIGHT RADIAL DORSAL HAND
LOCATION DETAILED: RIGHT SUPERIOR MEDIAL UPPER BACK
LOCATION DETAILED: RIGHT BUTTOCK
LOCATION DETAILED: LEFT SUPERIOR LATERAL MIDBACK
LOCATION DETAILED: RIGHT LATERAL BUTTOCK
LOCATION DETAILED: RIGHT PROXIMAL DORSAL FOREARM
LOCATION DETAILED: LEFT MEDIAL UPPER BACK
LOCATION DETAILED: LEFT INFERIOR CENTRAL MALAR CHEEK
LOCATION DETAILED: RIGHT CENTRAL MALAR CHEEK

## 2023-02-14 ASSESSMENT — LOCATION ZONE DERM
LOCATION ZONE: TRUNK
LOCATION ZONE: LEG
LOCATION ZONE: HAND
LOCATION ZONE: FACE
LOCATION ZONE: ARM

## 2023-02-14 NOTE — HPI: MOLE CHECK
What Is The Reason For Today's Visit?: Mole Check
Additional History: Right buttock - regimenting nexus - 1mm x 5mm \\n\\nRight distal dorsal forearm - 2mm x 2mm \\n\\nRight anterior distal thigh - 4mm x 3mm

## 2023-02-17 LAB — VIT B1 BLD-MCNC: 126 NMOL/L (ref 70–180)

## 2023-03-02 ENCOUNTER — HOSPITAL ENCOUNTER (OUTPATIENT)
Dept: LAB | Facility: MEDICAL CENTER | Age: 43
End: 2023-03-02
Attending: INTERNAL MEDICINE
Payer: COMMERCIAL

## 2023-03-02 LAB
ALBUMIN SERPL BCP-MCNC: 4.5 G/DL (ref 3.2–4.9)
ALBUMIN/GLOB SERPL: 1.6 G/DL
ALP SERPL-CCNC: 92 U/L (ref 30–99)
ALT SERPL-CCNC: 90 U/L (ref 2–50)
ANION GAP SERPL CALC-SCNC: 13 MMOL/L (ref 7–16)
AST SERPL-CCNC: 41 U/L (ref 12–45)
BASOPHILS # BLD AUTO: 0.6 % (ref 0–1.8)
BASOPHILS # BLD: 0.04 K/UL (ref 0–0.12)
BILIRUB SERPL-MCNC: 0.4 MG/DL (ref 0.1–1.5)
BUN SERPL-MCNC: 17 MG/DL (ref 8–22)
CALCIUM ALBUM COR SERPL-MCNC: 8.7 MG/DL (ref 8.5–10.5)
CALCIUM SERPL-MCNC: 9.1 MG/DL (ref 8.5–10.5)
CHLORIDE SERPL-SCNC: 105 MMOL/L (ref 96–112)
CO2 SERPL-SCNC: 23 MMOL/L (ref 20–33)
CREAT SERPL-MCNC: 0.67 MG/DL (ref 0.5–1.4)
EOSINOPHIL # BLD AUTO: 0.18 K/UL (ref 0–0.51)
EOSINOPHIL NFR BLD: 2.6 % (ref 0–6.9)
ERYTHROCYTE [DISTWIDTH] IN BLOOD BY AUTOMATED COUNT: 41.1 FL (ref 35.9–50)
FERRITIN SERPL-MCNC: 42 NG/ML (ref 10–291)
GFR SERPLBLD CREATININE-BSD FMLA CKD-EPI: 112 ML/MIN/1.73 M 2
GLOBULIN SER CALC-MCNC: 2.9 G/DL (ref 1.9–3.5)
GLUCOSE SERPL-MCNC: 68 MG/DL (ref 65–99)
HCT VFR BLD AUTO: 41.1 % (ref 37–47)
HGB BLD-MCNC: 13.9 G/DL (ref 12–16)
IMM GRANULOCYTES # BLD AUTO: 0.02 K/UL (ref 0–0.11)
IMM GRANULOCYTES NFR BLD AUTO: 0.3 % (ref 0–0.9)
LYMPHOCYTES # BLD AUTO: 2.37 K/UL (ref 1–4.8)
LYMPHOCYTES NFR BLD: 34.6 % (ref 22–41)
MCH RBC QN AUTO: 32.3 PG (ref 27–33)
MCHC RBC AUTO-ENTMCNC: 33.8 G/DL (ref 33.6–35)
MCV RBC AUTO: 95.6 FL (ref 81.4–97.8)
MONOCYTES # BLD AUTO: 0.57 K/UL (ref 0–0.85)
MONOCYTES NFR BLD AUTO: 8.3 % (ref 0–13.4)
NEUTROPHILS # BLD AUTO: 3.66 K/UL (ref 2–7.15)
NEUTROPHILS NFR BLD: 53.6 % (ref 44–72)
NRBC # BLD AUTO: 0 K/UL
NRBC BLD-RTO: 0 /100 WBC
PLATELET # BLD AUTO: 212 K/UL (ref 164–446)
PMV BLD AUTO: 10.4 FL (ref 9–12.9)
POTASSIUM SERPL-SCNC: 4.3 MMOL/L (ref 3.6–5.5)
PROT SERPL-MCNC: 7.4 G/DL (ref 6–8.2)
RBC # BLD AUTO: 4.3 M/UL (ref 4.2–5.4)
SODIUM SERPL-SCNC: 141 MMOL/L (ref 135–145)
WBC # BLD AUTO: 6.8 K/UL (ref 4.8–10.8)

## 2023-03-02 PROCEDURE — 82728 ASSAY OF FERRITIN: CPT

## 2023-03-02 PROCEDURE — 36415 COLL VENOUS BLD VENIPUNCTURE: CPT

## 2023-03-02 PROCEDURE — 85025 COMPLETE CBC W/AUTO DIFF WBC: CPT

## 2023-03-02 PROCEDURE — 80053 COMPREHEN METABOLIC PANEL: CPT

## 2023-05-20 ENCOUNTER — HOSPITAL ENCOUNTER (OUTPATIENT)
Facility: MEDICAL CENTER | Age: 43
End: 2023-05-20
Attending: NURSE PRACTITIONER
Payer: COMMERCIAL

## 2023-05-20 ENCOUNTER — OFFICE VISIT (OUTPATIENT)
Dept: URGENT CARE | Facility: CLINIC | Age: 43
End: 2023-05-20
Payer: COMMERCIAL

## 2023-05-20 VITALS
OXYGEN SATURATION: 98 % | RESPIRATION RATE: 18 BRPM | SYSTOLIC BLOOD PRESSURE: 108 MMHG | HEIGHT: 60 IN | BODY MASS INDEX: 25.91 KG/M2 | WEIGHT: 132 LBS | HEART RATE: 62 BPM | TEMPERATURE: 96.5 F | DIASTOLIC BLOOD PRESSURE: 62 MMHG

## 2023-05-20 DIAGNOSIS — N61.0 ACUTE MASTITIS: ICD-10-CM

## 2023-05-20 PROCEDURE — 87075 CULTR BACTERIA EXCEPT BLOOD: CPT

## 2023-05-20 PROCEDURE — 3074F SYST BP LT 130 MM HG: CPT | Performed by: NURSE PRACTITIONER

## 2023-05-20 PROCEDURE — 87077 CULTURE AEROBIC IDENTIFY: CPT

## 2023-05-20 PROCEDURE — 99213 OFFICE O/P EST LOW 20 MIN: CPT | Performed by: NURSE PRACTITIONER

## 2023-05-20 PROCEDURE — 3078F DIAST BP <80 MM HG: CPT | Performed by: NURSE PRACTITIONER

## 2023-05-20 PROCEDURE — 87205 SMEAR GRAM STAIN: CPT

## 2023-05-20 PROCEDURE — 87070 CULTURE OTHR SPECIMN AEROBIC: CPT

## 2023-05-20 RX ORDER — SULFAMETHOXAZOLE AND TRIMETHOPRIM 800; 160 MG/1; MG/1
1 TABLET ORAL 2 TIMES DAILY
Qty: 14 TABLET | Refills: 0 | Status: SHIPPED | OUTPATIENT
Start: 2023-05-20 | End: 2023-05-27

## 2023-05-20 ASSESSMENT — FIBROSIS 4 INDEX: FIB4 SCORE: 0.84

## 2023-05-21 LAB
GRAM STN SPEC: NORMAL
SIGNIFICANT IND 70042: NORMAL
SITE SITE: NORMAL
SOURCE SOURCE: NORMAL

## 2023-05-21 NOTE — PROGRESS NOTES
Patient has consented to treatment and for use of patient information for treatment and billing purposes.    Date: 23     Arrival Mode: Private Vehicle    Chief Complaint:    Chief Complaint   Patient presents with    Breast Problem     Blood in breast milk after pumping        History of Present Illness: 42 y.o.  female presents to clinic with blood in her pumped breast milk.  Patient reports that she exclusively pumps for her 4-month-old child.  She recently returned from a trip from [place] of which she did stick to her pumping schedule.  She denies that she pumped last frequently on this trip.  She states approximately 1 hour ago she was pumping and noticed blood from her right breast.  She states that she could see small clots of blood within the milk.  She denies any nipple pain or scabs to her nipples.  She states she has felt some body aches and generally wearing down.  She states while she was in [place] she did have a day that she did obtain diarrhea.  She states currently she is not having any diarrhea.  She denies any known fevers.  She does admit to breast tenderness although states both breasts are tender she denies any focal areas of tenderness recent clogged ducts.  No known family history of breast cancer.  She denies noticing any lumps or bumps.  She states while in [place] she did not have a microwave to sterilize her pump although she did clean her pump parts to the best of her ability.  She denies that her child who is drinking the breast milk has had any fevers body aches or signs of illness.      ROS:    As stated in HPI     Pertinent Medical History:  Past Medical History:   Diagnosis Date    Blood in stool 10/13/2014    Pregnant 23        Pertinent Surgical History:  Past Surgical History:   Procedure Laterality Date    REPEAT C SECTION N/A 2023    Procedure:  SECTION, REPEAT;  Surgeon: La Lacey M.D.;  Location: SURGERY LABOR AND DELIVERY;  Service: Labor and  Delivery    PRIMARY C SECTION  2/10/2021    Procedure:  SECTION, PRIMARY;  Surgeon: La Lacey M.D.;  Location: SURGERY LABOR AND DELIVERY;  Service: Labor and Delivery    FINGER EXPLORATION Right 2018    Procedure: FINGER EXPLORATION-  THUMB INTERPHALANGEAL JOINT AND BIOPSY;  Surgeon: Braxton Aggarwal M.D.;  Location: SURGERY SAME DAY [facility];  Service: Orthopedics    [facility]          Pertinent Medications:    Current Outpatient Medications on File Prior to Visit   Medication Sig Dispense Refill    acetaminophen (TYLENOL) 500 MG Tab Take 2 Tablets by mouth every 6 hours. 30 Tablet 0    docusate sodium 100 MG Cap Take 100 mg by mouth 2 times a day as needed for Constipation. 60 Capsule 1    ibuprofen (MOTRIN) 800 MG Tab Take 1 Tablet by mouth every 8 hours. Indications: Joint Damage causing Pain and Loss of Function 60 Tablet 1    ferrous sulfate 325 (65 Fe) MG tablet Take 1 Tablet by mouth every day. 30 Tablet 4    vitamin D2, Ergocalciferol, (DRISDOL) 1.25 MG (92368 UT) Cap capsule Take 5,000 Units by mouth every 7 days.      Prenatal Vit w/Ec-Ncplencab-IC (PNV PO) Take  by mouth.      Docosahexaenoic Acid (DHA PO) Take 250 mg by mouth.       No current facility-administered medications on file prior to visit.        Allergies:    Patient has no known allergies.     Social History:  Social History     Tobacco Use    Smoking status: Never     Passive exposure: Never    Smokeless tobacco: Never   Vaping Use    Vaping Use: Never used   Substance Use Topics    Alcohol use: Not Currently     Alcohol/week: 0.6 oz     Types: 1 Glasses of wine per week     Comment: OCC    Drug use: Never        No LMP recorded.           Physical Exam:    Vitals:    23 2043   BP: 108/62   Pulse: 62   Resp: 18   Temp: 35.8 °C (96.5 °F)   SpO2: 98%             Physical Exam  Constitutional:       General: She is not in acute distress.     Appearance: Normal appearance. She is well-developed and normal  weight. She is not ill-appearing, toxic-appearing or diaphoretic.   HENT:      Head: Normocephalic and atraumatic.   Eyes:      General: Lids are normal. Gaze aligned appropriately. No allergic shiner or scleral icterus.     Extraocular Movements: Extraocular movements intact.      Conjunctiva/sclera: Conjunctivae normal.   Cardiovascular:      Rate and Rhythm: Normal rate and regular rhythm.      Pulses:           Radial pulses are 2+ on the right side and 2+ on the left side.      Heart sounds: Normal heart sounds.   Pulmonary:      Effort: Pulmonary effort is normal.      Breath sounds: Normal breath sounds and air entry. No decreased breath sounds, wheezing, rhonchi or rales.   Chest:   Breasts:     Right: Tenderness present. No swelling, bleeding, mass or skin change.      Left: Tenderness present. No swelling, bleeding, mass or skin change.   Abdominal:      General: Abdomen is flat. Bowel sounds are normal.      Palpations: Abdomen is soft.      Tenderness: There is no abdominal tenderness.   Musculoskeletal:      Right lower leg: No edema.      Left lower leg: No edema.   Lymphadenopathy:      Upper Body:      Right upper body: No supraclavicular or axillary adenopathy.      Left upper body: No supraclavicular or axillary adenopathy.   Skin:     General: Skin is warm.      Capillary Refill: Capillary refill takes less than 2 seconds.      Coloration: Skin is not cyanotic or pale.   Neurological:      Mental Status: She is alert and oriented to person, place, and time.      Gait: Gait is intact.   Psychiatric:         Behavior: Behavior normal. Behavior is cooperative.                  Diagnostics:    Milk culture pending     Medical Decision making and clinic course :  I personally reviewed prior external notes and test results pertinent to today's visit. Pt is clinically stable at today's acute urgent care visit.  No acute distress noted. Appropriate for outpatient care at this time. Shared decision-making  was utilized with patient for treatment plan.      Pleasant nontoxic-appearing 42-year-old female presenting to clinic with 1 episode of bloody breastmilk.  Patient did bring the breastmilk with her I do visualize several small blood clots within the milk.  On exam patient has bilateral breast tenderness no noted masses or focal areas of tenderness or clogged ducts.  Discussed differentials with patient to include broken capillaries within the milk ducts, mastitis as well as a ductal cyst.  Using shared decision making we will send for a milk culture and start patient on antibiotics.  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        The patient remained stable during the urgent care visit.    Plan:    Medication discussed included indication for use and the potential  benefits and side effects.     1. Acute mastitis    - sulfamethoxazole-trimethoprim (BACTRIM DS) 800-160 MG tablet; Take 1 Tablet by mouth 2 times a day for 7 days.  Dispense: 14 Tablet; Refill: 0  - BODY FLUID CULTURE AER/ANAER       All of the patient's questions were answered to their satisfaction at the time of discharge.    Follow up:    Recommended f/u with obgyn in one weeks time.     Patient was encouraged to monitor symptoms closely. Those signs and symptoms which would warrant concern and mandate seeking a higher level of service through the emergency department discussed at length and included in discharge papers.  Patient stated agreement and understanding of this plan of care.    Disposition:  Home in stable condition       Voice Recognition Disclaimer:  Portions of this document were created using voice recognition software. The software does have a chance of producing errors of grammar and possibly content. I have made every reasonable attempt to correct obvious errors, but there may be errors of grammar and possibly content that I did not discover before finalizing the documentation.    Eliz Manjarrez,  OSITO

## 2023-05-24 LAB
BACTERIA FLD AEROBE CULT: NORMAL
BACTERIA SPEC ANAEROBE CULT: NORMAL
GRAM STN SPEC: NORMAL
SIGNIFICANT IND 70042: NORMAL
SIGNIFICANT IND 70042: NORMAL
SITE SITE: NORMAL
SITE SITE: NORMAL
SOURCE SOURCE: NORMAL
SOURCE SOURCE: NORMAL

## 2023-07-27 ENCOUNTER — EH NON-PROVIDER (OUTPATIENT)
Dept: OCCUPATIONAL MEDICINE | Facility: CLINIC | Age: 43
End: 2023-07-27

## 2023-07-27 DIAGNOSIS — Z02.89 ENCOUNTER FOR OCCUPATIONAL HEALTH EXAMINATION INVOLVING RESPIRATOR: ICD-10-CM

## 2023-07-27 PROCEDURE — 94375 RESPIRATORY FLOW VOLUME LOOP: CPT | Performed by: PREVENTIVE MEDICINE

## 2023-08-05 ENCOUNTER — HOSPITAL ENCOUNTER (OUTPATIENT)
Dept: LAB | Facility: MEDICAL CENTER | Age: 43
End: 2023-08-05
Attending: PHYSICIAN ASSISTANT
Payer: COMMERCIAL

## 2023-08-05 LAB
BASOPHILS # BLD AUTO: 0.6 % (ref 0–1.8)
BASOPHILS # BLD: 0.03 K/UL (ref 0–0.12)
EOSINOPHIL # BLD AUTO: 0.11 K/UL (ref 0–0.51)
EOSINOPHIL NFR BLD: 2.2 % (ref 0–6.9)
ERYTHROCYTE [DISTWIDTH] IN BLOOD BY AUTOMATED COUNT: 42.2 FL (ref 35.9–50)
HCT VFR BLD AUTO: 41 % (ref 37–47)
HGB BLD-MCNC: 13.7 G/DL (ref 12–16)
IMM GRANULOCYTES # BLD AUTO: 0.01 K/UL (ref 0–0.11)
IMM GRANULOCYTES NFR BLD AUTO: 0.2 % (ref 0–0.9)
LYMPHOCYTES # BLD AUTO: 1.74 K/UL (ref 1–4.8)
LYMPHOCYTES NFR BLD: 34.5 % (ref 22–41)
MCH RBC QN AUTO: 31.8 PG (ref 27–33)
MCHC RBC AUTO-ENTMCNC: 33.4 G/DL (ref 32.2–35.5)
MCV RBC AUTO: 95.1 FL (ref 81.4–97.8)
MONOCYTES # BLD AUTO: 0.53 K/UL (ref 0–0.85)
MONOCYTES NFR BLD AUTO: 10.5 % (ref 0–13.4)
NEUTROPHILS # BLD AUTO: 2.62 K/UL (ref 1.82–7.42)
NEUTROPHILS NFR BLD: 52 % (ref 44–72)
NRBC # BLD AUTO: 0 K/UL
NRBC BLD-RTO: 0 /100 WBC (ref 0–0.2)
PLATELET # BLD AUTO: 216 K/UL (ref 164–446)
PMV BLD AUTO: 10.7 FL (ref 9–12.9)
RBC # BLD AUTO: 4.31 M/UL (ref 4.2–5.4)
WBC # BLD AUTO: 5 K/UL (ref 4.8–10.8)

## 2023-08-05 PROCEDURE — 36415 COLL VENOUS BLD VENIPUNCTURE: CPT

## 2023-08-05 PROCEDURE — 85025 COMPLETE CBC W/AUTO DIFF WBC: CPT

## 2023-08-16 ENCOUNTER — APPOINTMENT (RX ONLY)
Dept: URBAN - METROPOLITAN AREA CLINIC 20 | Facility: CLINIC | Age: 43
Setting detail: DERMATOLOGY
End: 2023-08-16

## 2023-08-16 DIAGNOSIS — D18.0 HEMANGIOMA: ICD-10-CM

## 2023-08-16 DIAGNOSIS — L82.1 OTHER SEBORRHEIC KERATOSIS: ICD-10-CM

## 2023-08-16 DIAGNOSIS — L81.4 OTHER MELANIN HYPERPIGMENTATION: ICD-10-CM

## 2023-08-16 DIAGNOSIS — L57.8 OTHER SKIN CHANGES DUE TO CHRONIC EXPOSURE TO NONIONIZING RADIATION: ICD-10-CM

## 2023-08-16 DIAGNOSIS — Z87.2 PERSONAL HISTORY OF DISEASES OF THE SKIN AND SUBCUTANEOUS TISSUE: ICD-10-CM

## 2023-08-16 DIAGNOSIS — L91.8 OTHER HYPERTROPHIC DISORDERS OF THE SKIN: ICD-10-CM

## 2023-08-16 DIAGNOSIS — L81.1 CHLOASMA: ICD-10-CM

## 2023-08-16 DIAGNOSIS — L91.0 HYPERTROPHIC SCAR: ICD-10-CM

## 2023-08-16 DIAGNOSIS — D22 MELANOCYTIC NEVI: ICD-10-CM

## 2023-08-16 PROBLEM — D22.5 MELANOCYTIC NEVI OF TRUNK: Status: ACTIVE | Noted: 2023-08-16

## 2023-08-16 PROBLEM — D22.39 MELANOCYTIC NEVI OF OTHER PARTS OF FACE: Status: ACTIVE | Noted: 2023-08-16

## 2023-08-16 PROBLEM — D22.62 MELANOCYTIC NEVI OF LEFT UPPER LIMB, INCLUDING SHOULDER: Status: ACTIVE | Noted: 2023-08-16

## 2023-08-16 PROBLEM — D18.01 HEMANGIOMA OF SKIN AND SUBCUTANEOUS TISSUE: Status: ACTIVE | Noted: 2023-08-16

## 2023-08-16 PROBLEM — D22.61 MELANOCYTIC NEVI OF RIGHT UPPER LIMB, INCLUDING SHOULDER: Status: ACTIVE | Noted: 2023-08-16

## 2023-08-16 PROBLEM — D22.72 MELANOCYTIC NEVI OF LEFT LOWER LIMB, INCLUDING HIP: Status: ACTIVE | Noted: 2023-08-16

## 2023-08-16 PROBLEM — D22.71 MELANOCYTIC NEVI OF RIGHT LOWER LIMB, INCLUDING HIP: Status: ACTIVE | Noted: 2023-08-16

## 2023-08-16 PROCEDURE — ? COUNSELING

## 2023-08-16 PROCEDURE — 11200 RMVL SKIN TAGS UP TO&INC 15: CPT

## 2023-08-16 PROCEDURE — ? SKIN TAG REMOVAL

## 2023-08-16 PROCEDURE — ? OBSERVATION AND MEASURE

## 2023-08-16 PROCEDURE — ? DIAGNOSIS COMMENT

## 2023-08-16 PROCEDURE — 99213 OFFICE O/P EST LOW 20 MIN: CPT | Mod: 25

## 2023-08-16 ASSESSMENT — LOCATION ZONE DERM
LOCATION ZONE: LEG
LOCATION ZONE: ARM
LOCATION ZONE: FACE
LOCATION ZONE: TRUNK
LOCATION ZONE: HAND
LOCATION ZONE: NOSE
LOCATION ZONE: AXILLAE

## 2023-08-16 ASSESSMENT — LOCATION SIMPLE DESCRIPTION DERM
LOCATION SIMPLE: LEFT LOWER BACK
LOCATION SIMPLE: LEFT THIGH
LOCATION SIMPLE: LEFT FOREHEAD
LOCATION SIMPLE: NOSE
LOCATION SIMPLE: LEFT POSTERIOR UPPER ARM
LOCATION SIMPLE: LEFT CHEEK
LOCATION SIMPLE: RIGHT THIGH
LOCATION SIMPLE: RIGHT FOREARM
LOCATION SIMPLE: RIGHT UPPER BACK
LOCATION SIMPLE: LEFT FOREARM
LOCATION SIMPLE: LEFT POSTERIOR THIGH
LOCATION SIMPLE: LEFT UPPER ARM
LOCATION SIMPLE: ABDOMEN
LOCATION SIMPLE: RIGHT BUTTOCK
LOCATION SIMPLE: RIGHT HAND
LOCATION SIMPLE: RIGHT POSTERIOR UPPER ARM
LOCATION SIMPLE: LEFT AXILLARY VAULT
LOCATION SIMPLE: RIGHT UPPER ARM
LOCATION SIMPLE: CHEST
LOCATION SIMPLE: RIGHT CHEEK
LOCATION SIMPLE: LEFT UPPER BACK
LOCATION SIMPLE: RIGHT FOREHEAD
LOCATION SIMPLE: RIGHT POSTERIOR THIGH
LOCATION SIMPLE: LEFT BREAST

## 2023-08-16 ASSESSMENT — LOCATION DETAILED DESCRIPTION DERM
LOCATION DETAILED: RIGHT ANTERIOR DISTAL THIGH
LOCATION DETAILED: RIGHT DISTAL POSTERIOR UPPER ARM
LOCATION DETAILED: RIGHT FOREHEAD
LOCATION DETAILED: PERIUMBILICAL SKIN
LOCATION DETAILED: LEFT DISTAL POSTERIOR UPPER ARM
LOCATION DETAILED: RIGHT CENTRAL MANDIBULAR CHEEK
LOCATION DETAILED: LEFT DISTAL POSTERIOR THIGH
LOCATION DETAILED: LEFT INFERIOR CENTRAL MALAR CHEEK
LOCATION DETAILED: LEFT FOREHEAD
LOCATION DETAILED: RIGHT SUPERIOR MEDIAL UPPER BACK
LOCATION DETAILED: LEFT ANTERIOR PROXIMAL UPPER ARM
LOCATION DETAILED: LEFT AXILLARY VAULT
LOCATION DETAILED: RIGHT RADIAL DORSAL HAND
LOCATION DETAILED: NASAL ROOT
LOCATION DETAILED: LEFT PROXIMAL DORSAL FOREARM
LOCATION DETAILED: RIGHT BUTTOCK
LOCATION DETAILED: RIGHT LATERAL BUTTOCK
LOCATION DETAILED: LEFT MEDIAL UPPER BACK
LOCATION DETAILED: RIGHT MID-UPPER BACK
LOCATION DETAILED: LEFT MEDIAL BREAST 7-8:00 REGION
LOCATION DETAILED: LEFT SUPERIOR LATERAL MIDBACK
LOCATION DETAILED: LEFT ANTERIOR DISTAL THIGH
LOCATION DETAILED: RIGHT CENTRAL MALAR CHEEK
LOCATION DETAILED: RIGHT ANTERIOR PROXIMAL UPPER ARM
LOCATION DETAILED: RIGHT PROXIMAL DORSAL FOREARM
LOCATION DETAILED: RIGHT DISTAL POSTERIOR THIGH
LOCATION DETAILED: LEFT MEDIAL SUPERIOR CHEST
LOCATION DETAILED: RIGHT INFERIOR UPPER BACK
LOCATION DETAILED: RIGHT ANTERIOR PROXIMAL THIGH

## 2023-08-16 NOTE — PROCEDURE: SKIN TAG REMOVAL
Add Associated Diagnoses If Applicable When Selecting Medical Necessity: Yes
Anesthesia Volume In Cc: 3
Include Z78.9 (Other Specified Conditions Influencing Health Status) As An Associated Diagnosis?: No
Anesthesia Type: 1% lidocaine with epinephrine
Medical Necessity Clause: This procedure was medically necessary because the lesions that were treated were:
Medical Necessity Information: It is in your best interest to select a reason for this procedure from the list below. All of these items fulfill various CMS LCD requirements except the new and changing color options.
Consent: Written consent obtained and the risks of skin tag removal was reviewed with the patient including but not limited to bleeding, pigmentary change, infection, pain, and remote possibility of scarring.
Detail Level: Detailed

## 2023-08-19 ENCOUNTER — HOSPITAL ENCOUNTER (OUTPATIENT)
Dept: LAB | Facility: MEDICAL CENTER | Age: 43
End: 2023-08-19
Attending: OBSTETRICS & GYNECOLOGY
Payer: COMMERCIAL

## 2023-08-19 LAB — TSH SERPL DL<=0.005 MIU/L-ACNC: 0.76 UIU/ML (ref 0.38–5.33)

## 2023-08-19 PROCEDURE — 84443 ASSAY THYROID STIM HORMONE: CPT

## 2023-08-19 PROCEDURE — 36415 COLL VENOUS BLD VENIPUNCTURE: CPT

## 2023-09-02 ENCOUNTER — HOSPITAL ENCOUNTER (OUTPATIENT)
Dept: LAB | Facility: MEDICAL CENTER | Age: 43
End: 2023-09-02
Attending: PHYSICIAN ASSISTANT
Payer: COMMERCIAL

## 2023-09-02 LAB
25(OH)D3 SERPL-MCNC: 39 NG/ML (ref 30–100)
ALBUMIN SERPL BCP-MCNC: 4.4 G/DL (ref 3.2–4.9)
ALBUMIN/GLOB SERPL: 1.7 G/DL
ALP SERPL-CCNC: 97 U/L (ref 30–99)
ALT SERPL-CCNC: 21 U/L (ref 2–50)
ANION GAP SERPL CALC-SCNC: 10 MMOL/L (ref 7–16)
AST SERPL-CCNC: 17 U/L (ref 12–45)
BASOPHILS # BLD AUTO: 0.7 % (ref 0–1.8)
BASOPHILS # BLD: 0.04 K/UL (ref 0–0.12)
BILIRUB SERPL-MCNC: 0.4 MG/DL (ref 0.1–1.5)
BUN SERPL-MCNC: 16 MG/DL (ref 8–22)
CALCIUM ALBUM COR SERPL-MCNC: 9.1 MG/DL (ref 8.5–10.5)
CALCIUM SERPL-MCNC: 9.4 MG/DL (ref 8.5–10.5)
CHLORIDE SERPL-SCNC: 106 MMOL/L (ref 96–112)
CHOLEST SERPL-MCNC: 163 MG/DL (ref 100–199)
CO2 SERPL-SCNC: 24 MMOL/L (ref 20–33)
CREAT SERPL-MCNC: 0.68 MG/DL (ref 0.5–1.4)
EOSINOPHIL # BLD AUTO: 0.15 K/UL (ref 0–0.51)
EOSINOPHIL NFR BLD: 2.7 % (ref 0–6.9)
ERYTHROCYTE [DISTWIDTH] IN BLOOD BY AUTOMATED COUNT: 43.3 FL (ref 35.9–50)
EST. AVERAGE GLUCOSE BLD GHB EST-MCNC: 85 MG/DL
FASTING STATUS PATIENT QL REPORTED: NORMAL
FERRITIN SERPL-MCNC: 19.2 NG/ML (ref 10–291)
GFR SERPLBLD CREATININE-BSD FMLA CKD-EPI: 111 ML/MIN/1.73 M 2
GLOBULIN SER CALC-MCNC: 2.6 G/DL (ref 1.9–3.5)
GLUCOSE SERPL-MCNC: 78 MG/DL (ref 65–99)
HBA1C MFR BLD: 4.6 % (ref 4–5.6)
HCT VFR BLD AUTO: 40.2 % (ref 37–47)
HDLC SERPL-MCNC: 50 MG/DL
HGB BLD-MCNC: 13.7 G/DL (ref 12–16)
IMM GRANULOCYTES # BLD AUTO: 0.03 K/UL (ref 0–0.11)
IMM GRANULOCYTES NFR BLD AUTO: 0.5 % (ref 0–0.9)
IRON SATN MFR SERPL: 36 % (ref 15–55)
IRON SERPL-MCNC: 109 UG/DL (ref 40–170)
LDLC SERPL CALC-MCNC: 101 MG/DL
LYMPHOCYTES # BLD AUTO: 2.07 K/UL (ref 1–4.8)
LYMPHOCYTES NFR BLD: 36.7 % (ref 22–41)
MCH RBC QN AUTO: 32.2 PG (ref 27–33)
MCHC RBC AUTO-ENTMCNC: 34.1 G/DL (ref 32.2–35.5)
MCV RBC AUTO: 94.4 FL (ref 81.4–97.8)
MONOCYTES # BLD AUTO: 0.58 K/UL (ref 0–0.85)
MONOCYTES NFR BLD AUTO: 10.3 % (ref 0–13.4)
NEUTROPHILS # BLD AUTO: 2.77 K/UL (ref 1.82–7.42)
NEUTROPHILS NFR BLD: 49.1 % (ref 44–72)
NRBC # BLD AUTO: 0 K/UL
NRBC BLD-RTO: 0 /100 WBC (ref 0–0.2)
PLATELET # BLD AUTO: 274 K/UL (ref 164–446)
PMV BLD AUTO: 10.5 FL (ref 9–12.9)
POTASSIUM SERPL-SCNC: 4.7 MMOL/L (ref 3.6–5.5)
PROT SERPL-MCNC: 7 G/DL (ref 6–8.2)
RBC # BLD AUTO: 4.26 M/UL (ref 4.2–5.4)
SODIUM SERPL-SCNC: 140 MMOL/L (ref 135–145)
TIBC SERPL-MCNC: 303 UG/DL (ref 250–450)
TRIGL SERPL-MCNC: 61 MG/DL (ref 0–149)
UIBC SERPL-MCNC: 194 UG/DL (ref 110–370)
WBC # BLD AUTO: 5.6 K/UL (ref 4.8–10.8)

## 2023-09-02 PROCEDURE — 83036 HEMOGLOBIN GLYCOSYLATED A1C: CPT

## 2023-09-02 PROCEDURE — 80053 COMPREHEN METABOLIC PANEL: CPT

## 2023-09-02 PROCEDURE — 80061 LIPID PANEL: CPT

## 2023-09-02 PROCEDURE — 83540 ASSAY OF IRON: CPT

## 2023-09-02 PROCEDURE — 83550 IRON BINDING TEST: CPT

## 2023-09-02 PROCEDURE — 85025 COMPLETE CBC W/AUTO DIFF WBC: CPT

## 2023-09-02 PROCEDURE — 36415 COLL VENOUS BLD VENIPUNCTURE: CPT

## 2023-09-02 PROCEDURE — 82728 ASSAY OF FERRITIN: CPT

## 2023-09-02 PROCEDURE — 82306 VITAMIN D 25 HYDROXY: CPT

## 2023-09-29 ENCOUNTER — IMMUNIZATION (OUTPATIENT)
Dept: OCCUPATIONAL MEDICINE | Facility: CLINIC | Age: 43
End: 2023-09-29

## 2023-09-29 DIAGNOSIS — Z23 NEED FOR VACCINATION: Primary | ICD-10-CM

## 2023-09-29 PROCEDURE — 90686 IIV4 VACC NO PRSV 0.5 ML IM: CPT | Performed by: PREVENTIVE MEDICINE

## 2023-12-18 ENCOUNTER — OFFICE VISIT (OUTPATIENT)
Dept: SURGICAL ONCOLOGY | Facility: MEDICAL CENTER | Age: 43
End: 2023-12-18
Payer: COMMERCIAL

## 2023-12-18 VITALS
TEMPERATURE: 97.9 F | HEART RATE: 84 BPM | DIASTOLIC BLOOD PRESSURE: 64 MMHG | BODY MASS INDEX: 25.52 KG/M2 | HEIGHT: 60 IN | WEIGHT: 130 LBS | OXYGEN SATURATION: 95 % | SYSTOLIC BLOOD PRESSURE: 102 MMHG

## 2023-12-18 DIAGNOSIS — D16.11: ICD-10-CM

## 2023-12-18 PROCEDURE — 3078F DIAST BP <80 MM HG: CPT | Performed by: ORTHOPAEDIC SURGERY

## 2023-12-18 PROCEDURE — 99205 OFFICE O/P NEW HI 60 MIN: CPT | Performed by: ORTHOPAEDIC SURGERY

## 2023-12-18 PROCEDURE — 3074F SYST BP LT 130 MM HG: CPT | Performed by: ORTHOPAEDIC SURGERY

## 2023-12-18 RX ORDER — MELOXICAM 15 MG/1
15 TABLET ORAL
Status: ON HOLD | COMMUNITY
Start: 2023-12-01 | End: 2024-01-19

## 2023-12-18 ASSESSMENT — FIBROSIS 4 INDEX: FIB4 SCORE: 0.57

## 2023-12-18 NOTE — PROGRESS NOTES
Subjective:   2023  3:03 PM  Primary care physician:Leif Vazquez M.D.    Chief Complaint: Right thumb osteoid osteoma    History of presenting illness:  Pauline Sharp  is a pleasant 43 y.o. female who was referred for evaluation of a right thumb osteoid osteoma.  She reports she has known about the tumor and has had pain for at least 6 years.  She has been under the care of Dr. Naranjo locally whom also referred her to the [place] orthopedic oncology team previously discussed options with her.  She has also sought evaluation from the team at [facility] and [facility].  She has been ready to schedule surgery previously, but ultimately canceled because she was pregnant.  She has been taking anti-inflammatories off and on for the past 6 years.  She notes that when she is taking anti-inflammatories it dramatically reduces the pain.  And the pain returns when the medicine wears off.  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  She reports nothing was done to the bone, but the pathology returned as synovitis and not TSGCT.  I confirmed that with the pathology records.  She reports her pain is also associated with stiffness in the thumb.  This is her dominant hand.  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    She notes that the prior surgeons that have evaluated her counseled her that she was not a candidate for any ablative procedures like radiofrequency ablation or high-frequency ultrasound secondary to the subchondral location of the tumor and superficial nature of the location.        Past Medical History:   Diagnosis Date    Blood in stool 10/13/2014    Pregnant 23     Past Surgical History:   Procedure Laterality Date    REPEAT C SECTION N/A 2023    Procedure:  SECTION, REPEAT;  Surgeon: La Lacey M.D.;  Location:  SURGERY LABOR AND DELIVERY;  Service: Labor and Delivery    PRIMARY C SECTION  2/10/2021    Procedure:  SECTION, PRIMARY;  Surgeon: La Lacey M.D.;  Location: SURGERY LABOR AND DELIVERY;  Service: Labor and Delivery    FINGER EXPLORATION Right 2018    Procedure: FINGER EXPLORATION-  THUMB INTERPHALANGEAL JOINT AND BIOPSY;  Surgeon: Braxton Aggarwal M.D.;  Location: SURGERY SAME DAY [facility];  Service: Orthopedics    [facility]       No Known Allergies  Outpatient Encounter Medications as of 2023   Medication Sig Dispense Refill    meloxicam (MOBIC) 15 MG tablet Take 1 Tablet by mouth every day.      acetaminophen (TYLENOL) 500 MG Tab Take 2 Tablets by mouth every 6 hours. 30 Tablet 0    ibuprofen (MOTRIN) 800 MG Tab Take 1 Tablet by mouth every 8 hours. Indications: Joint Damage causing Pain and Loss of Function 60 Tablet 1    vitamin D2, Ergocalciferol, (DRISDOL) 1.25 MG (15997 UT) Cap capsule Take 5,000 Units by mouth every 7 days.      docusate sodium 100 MG Cap Take 100 mg by mouth 2 times a day as needed for Constipation. 60 Capsule 1    ferrous sulfate 325 (65 Fe) MG tablet Take 1 Tablet by mouth every day. 30 Tablet 4    [DISCONTINUED] Prenatal Vit w/Lf-Mndshvjov-NH (PNV PO) Take  by mouth.      [DISCONTINUED] Docosahexaenoic Acid (DHA PO) Take 250 mg by mouth.       No facility-administered encounter medications on file as of 2023.     Social History     Socioeconomic History    Marital status:      Spouse name: Not on file    Number of children: Not on file    Years of education: Not on file    Highest education level: Not on file   Occupational History    Occupation: speech therapist   Tobacco Use    Smoking status: Never     Passive exposure: Never    Smokeless tobacco: Never   Vaping Use    Vaping Use: Never used   Substance and Sexual Activity    Alcohol use: Not Currently     Alcohol/week: 0.6 oz     Types: 1 Glasses of wine per week     Comment: OCC     Drug use: Never    Sexual activity: Yes     Partners: Male     Birth control/protection: Condom   Other Topics Concern    Not on file   Social History Narrative    Not on file     Social Determinants of Health     Financial Resource Strain: Low Risk  (4/7/2022)    Overall Financial Resource Strain (CARDIA)     Difficulty of Paying Living Expenses: Not hard at all   Food Insecurity: No Food Insecurity (4/7/2022)    Hunger Vital Sign     Worried About Running Out of Food in the Last Year: Never true     Ran Out of Food in the Last Year: Never true   Transportation Needs: No Transportation Needs (4/7/2022)    PRAPARE - Transportation     Lack of Transportation (Medical): No     Lack of Transportation (Non-Medical): No   Physical Activity: Insufficiently Active (4/7/2022)    Exercise Vital Sign     Days of Exercise per Week: 3 days     Minutes of Exercise per Session: 40 min   Stress: No Stress Concern Present (4/7/2022)    [place] [employer] - Occupational Stress Questionnaire     Feeling of Stress : Not at all   Social Connections: Moderately Isolated (4/7/2022)    Social Connection and Isolation Panel [NHANES]     Frequency of Communication with Friends and Family: More than three times a week     Frequency of Social Gatherings with Friends and Family: More than three times a week     Attends Mu-ism Services: Never     Active Member of Clubs or Organizations: No     Attends Club or Organization Meetings: Never     Marital Status:    Intimate Partner Violence: Not At Risk (4/7/2022)    Humiliation, Afraid, Rape, and Kick questionnaire     Fear of Current or Ex-Partner: No     Emotionally Abused: No     Physically Abused: No     Sexually Abused: No   Housing Stability: Low Risk  (4/7/2022)    Housing Stability Vital Sign     Unable to Pay for Housing in the Last Year: No     Number of Places Lived in the Last Year: 1     Unstable Housing in the Last Year: No      Social History      Tobacco Use   Smoking Status Never    Passive exposure: Never   Smokeless Tobacco Never     Social History     Substance and Sexual Activity   Alcohol Use Not Currently    Alcohol/week: 0.6 oz    Types: 1 Glasses of wine per week    Comment: OCC     Social History     Substance and Sexual Activity   Drug Use Never        Family History   Problem Relation Age of Onset    Kidney Disease Father 65        no dialysis    Heart Failure Maternal Grandmother     Heart Disease Maternal Grandmother         chf    Alzheimer's Disease Maternal Grandmother     Thyroid Mother         hypothyroid    Hypertension Mother     Hyperlipidemia Mother     No Known Problems Sister     Alzheimer's Disease Paternal Grandmother     Lung Cancer Paternal Grandfather     Clotting Disorder Brother         DVT    Hypertension Brother     Hyperlipidemia Brother     GI Disease Brother         bowel obstruction    Diabetes Neg Hx        Review of Systems   Constitutional:  Negative for chills, fever and weight loss.   Respiratory:  Negative for shortness of breath.    Cardiovascular:  Negative for chest pain.   Gastrointestinal:  Negative for abdominal pain.   Musculoskeletal:  Positive for joint pain. Negative for myalgias.   Skin:  Negative for rash.   Neurological:  Negative for sensory change and weakness.        Objective:   /64 (BP Location: Left arm, Patient Position: Sitting)   Pulse 84   Temp 36.6 °C (97.9 °F) (Temporal)   Ht 1.524 m (5')   Wt 59 kg (130 lb)   SpO2 95%   BMI 25.39 kg/m²     Physical Exam  Constitutional:       General: She is not in acute distress.     Appearance: Normal appearance. She is not ill-appearing.   HENT:      Head: Normocephalic and atraumatic.      Right Ear: External ear normal.      Left Ear: External ear normal.      Mouth/Throat:      Mouth: Mucous membranes are moist.   Eyes:      Extraocular Movements: Extraocular movements intact.      Conjunctiva/sclera: Conjunctivae normal.    Cardiovascular:      Rate and Rhythm: Normal rate.      Pulses: Normal pulses.   Pulmonary:      Effort: Pulmonary effort is normal.      Breath sounds: No wheezing.   Abdominal:      General: Abdomen is flat. There is no distension.   Musculoskeletal:      Cervical back: Normal range of motion and neck supple.      Comments: Right hand: There is a well-healed surgical scar over the IP joint the right thumb.  There is fullness of the thumb compared to the left.  There is limited flexion of the thumb IP joint with approximately 10-15 degrees of active range of motion.  There is tenderness to palpation over the proximal phalanx. Thumb flexion, extension and opposition intact.  Sensation intact to light touch radial and ulnar aspects of the digit.  Capillary refill less than 3 seconds.   Skin:     General: Skin is warm and dry.      Capillary Refill: Capillary refill takes less than 2 seconds.   Neurological:      Mental Status: She is alert and oriented to person, place, and time.   Psychiatric:         Mood and Affect: Mood normal.         Behavior: Behavior normal.           Imaging:  Per my read, multiple views of the right thumb and hand demonstrate a lucent lesion in the proximal phalanx epiphysis at the ulnar corner with a thin sclerotic border and narrow zone of transition.  There is also a central area of sclerotic nidus.  The lesion is abutting the joint and there is essentially no normal bone between the lesion and the joint itself.  The joint however does appear congruent without collapse.  There is no periosteal reaction.        Diagnosis:     1. 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  We discussed her symptoms and imaging findings are very consistent with osteoid osteoma.  I agree with the previous consultants she was evaluated by and that she is not a candidate for ablative procedures secondary to the location  "of the tumor.  Observation is an option for osteoid osteomas and they can \"burnout\" over time.  However this can take many years and there are risks associated with taking chronic NSAIDs but she is not willing to accept.  Surgically options include curettage versus en bloc resection.  Intralesional curettage is associated with higher risk of recurrence, however, again secondary to the difficult location of this tumor en bloc resection would be more morbid and she would almost assuredly need a fusion is trying to do an allograft reconstruction would likely fail.  I counseled her that even with the intralesional curettage she is at high risk for collapse of the joint and ultimately fusion of the IP joint.  We discussed that the primary purpose of the surgery would be to remove the tumor and improve her pain.  But that she should not count on any improvements in range of motion and in fact her stiffness may worsen after surgery.  She verbalized understanding and was in agreement with the plan to proceed.  Her main priority is improving her pain.    A full PARQ was held with the patient. Both surgical and non-operative options were discussed. They decided on surgery through shared decision making. We discussed risks include infection, blood loss, DVT/PE, damage to neurovascular structures, chronic pain, recurrence, stiffness, need for further surgery including IP fusion and unforeseeable events inherent to medical care. They also understand anesthesia will do a separate consent discussing risks inherent to anesthesia.They verbalized understanding and were in agreement to proceed with intralesional curettage and bone grafting right proximal phalanx osteoid osteoma.      Referrals: none  Restrictions: none, post op  New medications/refills: none  Imaging studies: none  Follow-up: post op      Tammy Valentino DO  Orthopedic Oncologist    "

## 2023-12-19 ASSESSMENT — ENCOUNTER SYMPTOMS
SHORTNESS OF BREATH: 0
WEIGHT LOSS: 0
SENSORY CHANGE: 0
MYALGIAS: 0
WEAKNESS: 0
CHILLS: 0
FEVER: 0
ABDOMINAL PAIN: 0

## 2024-01-05 ENCOUNTER — APPOINTMENT (OUTPATIENT)
Dept: ADMISSIONS | Facility: MEDICAL CENTER | Age: 44
End: 2024-01-05
Attending: ORTHOPAEDIC SURGERY
Payer: COMMERCIAL

## 2024-01-07 ENCOUNTER — HOSPITAL ENCOUNTER (OUTPATIENT)
Dept: RADIOLOGY | Facility: MEDICAL CENTER | Age: 44
End: 2024-01-07
Attending: INTERNAL MEDICINE
Payer: COMMERCIAL

## 2024-01-07 DIAGNOSIS — E83.110 HEREDITARY HEMOCHROMATOSIS (HCC): ICD-10-CM

## 2024-01-07 PROCEDURE — 76700 US EXAM ABDOM COMPLETE: CPT

## 2024-01-17 ENCOUNTER — PRE-ADMISSION TESTING (OUTPATIENT)
Dept: ADMISSIONS | Facility: MEDICAL CENTER | Age: 44
End: 2024-01-17
Attending: ORTHOPAEDIC SURGERY
Payer: COMMERCIAL

## 2024-01-17 DIAGNOSIS — Z01.810 PRE-OPERATIVE CARDIOVASCULAR EXAMINATION: ICD-10-CM

## 2024-01-17 DIAGNOSIS — Z01.812 PRE-OPERATIVE LABORATORY EXAMINATION: ICD-10-CM

## 2024-01-17 LAB
ABO GROUP BLD: NORMAL
ALBUMIN SERPL BCP-MCNC: 4.4 G/DL (ref 3.2–4.9)
ALBUMIN/GLOB SERPL: 1.8 G/DL
ALP SERPL-CCNC: 77 U/L (ref 30–99)
ALT SERPL-CCNC: 7 U/L (ref 2–50)
ANION GAP SERPL CALC-SCNC: 11 MMOL/L (ref 7–16)
AST SERPL-CCNC: 8 U/L (ref 12–45)
BASOPHILS # BLD AUTO: 0.5 % (ref 0–1.8)
BASOPHILS # BLD: 0.03 K/UL (ref 0–0.12)
BILIRUB SERPL-MCNC: 0.2 MG/DL (ref 0.1–1.5)
BLD GP AB SCN SERPL QL: NORMAL
BUN SERPL-MCNC: 19 MG/DL (ref 8–22)
CALCIUM ALBUM COR SERPL-MCNC: 8.8 MG/DL (ref 8.5–10.5)
CALCIUM SERPL-MCNC: 9.1 MG/DL (ref 8.5–10.5)
CHLORIDE SERPL-SCNC: 104 MMOL/L (ref 96–112)
CO2 SERPL-SCNC: 24 MMOL/L (ref 20–33)
CREAT SERPL-MCNC: 0.67 MG/DL (ref 0.5–1.4)
EKG IMPRESSION: NORMAL
EOSINOPHIL # BLD AUTO: 0.11 K/UL (ref 0–0.51)
EOSINOPHIL NFR BLD: 1.8 % (ref 0–6.9)
ERYTHROCYTE [DISTWIDTH] IN BLOOD BY AUTOMATED COUNT: 41 FL (ref 35.9–50)
GFR SERPLBLD CREATININE-BSD FMLA CKD-EPI: 111 ML/MIN/1.73 M 2
GLOBULIN SER CALC-MCNC: 2.5 G/DL (ref 1.9–3.5)
GLUCOSE SERPL-MCNC: 95 MG/DL (ref 65–99)
HCG SERPL QL: NEGATIVE
HCT VFR BLD AUTO: 39.2 % (ref 37–47)
HGB BLD-MCNC: 13.7 G/DL (ref 12–16)
IMM GRANULOCYTES # BLD AUTO: 0.02 K/UL (ref 0–0.11)
IMM GRANULOCYTES NFR BLD AUTO: 0.3 % (ref 0–0.9)
INR PPP: 0.99 (ref 0.87–1.13)
LYMPHOCYTES # BLD AUTO: 1.99 K/UL (ref 1–4.8)
LYMPHOCYTES NFR BLD: 32.5 % (ref 22–41)
MCH RBC QN AUTO: 32.3 PG (ref 27–33)
MCHC RBC AUTO-ENTMCNC: 34.9 G/DL (ref 32.2–35.5)
MCV RBC AUTO: 92.5 FL (ref 81.4–97.8)
MONOCYTES # BLD AUTO: 0.47 K/UL (ref 0–0.85)
MONOCYTES NFR BLD AUTO: 7.7 % (ref 0–13.4)
NEUTROPHILS # BLD AUTO: 3.5 K/UL (ref 1.82–7.42)
NEUTROPHILS NFR BLD: 57.2 % (ref 44–72)
NRBC # BLD AUTO: 0 K/UL
NRBC BLD-RTO: 0 /100 WBC (ref 0–0.2)
PLATELET # BLD AUTO: 264 K/UL (ref 164–446)
PMV BLD AUTO: 10.1 FL (ref 9–12.9)
POTASSIUM SERPL-SCNC: 3.9 MMOL/L (ref 3.6–5.5)
PROT SERPL-MCNC: 6.9 G/DL (ref 6–8.2)
PROTHROMBIN TIME: 13.2 SEC (ref 12–14.6)
RBC # BLD AUTO: 4.24 M/UL (ref 4.2–5.4)
RH BLD: NORMAL
SODIUM SERPL-SCNC: 139 MMOL/L (ref 135–145)
WBC # BLD AUTO: 6.1 K/UL (ref 4.8–10.8)

## 2024-01-17 PROCEDURE — 80053 COMPREHEN METABOLIC PANEL: CPT

## 2024-01-17 PROCEDURE — 86850 RBC ANTIBODY SCREEN: CPT

## 2024-01-17 PROCEDURE — 85025 COMPLETE CBC W/AUTO DIFF WBC: CPT

## 2024-01-17 PROCEDURE — 84703 CHORIONIC GONADOTROPIN ASSAY: CPT

## 2024-01-17 PROCEDURE — 85610 PROTHROMBIN TIME: CPT

## 2024-01-17 PROCEDURE — 93005 ELECTROCARDIOGRAM TRACING: CPT

## 2024-01-17 PROCEDURE — 86901 BLOOD TYPING SEROLOGIC RH(D): CPT

## 2024-01-17 PROCEDURE — 36415 COLL VENOUS BLD VENIPUNCTURE: CPT

## 2024-01-17 PROCEDURE — 93010 ELECTROCARDIOGRAM REPORT: CPT | Performed by: INTERNAL MEDICINE

## 2024-01-17 PROCEDURE — 86900 BLOOD TYPING SEROLOGIC ABO: CPT

## 2024-01-19 ENCOUNTER — PHARMACY VISIT (OUTPATIENT)
Dept: PHARMACY | Facility: MEDICAL CENTER | Age: 44
End: 2024-01-19
Payer: COMMERCIAL

## 2024-01-19 ENCOUNTER — APPOINTMENT (OUTPATIENT)
Dept: RADIOLOGY | Facility: MEDICAL CENTER | Age: 44
End: 2024-01-19
Attending: ORTHOPAEDIC SURGERY
Payer: COMMERCIAL

## 2024-01-19 ENCOUNTER — ANESTHESIA EVENT (OUTPATIENT)
Dept: SURGERY | Facility: MEDICAL CENTER | Age: 44
End: 2024-01-19
Payer: COMMERCIAL

## 2024-01-19 ENCOUNTER — ANESTHESIA (OUTPATIENT)
Dept: SURGERY | Facility: MEDICAL CENTER | Age: 44
End: 2024-01-19
Payer: COMMERCIAL

## 2024-01-19 ENCOUNTER — HOSPITAL ENCOUNTER (OUTPATIENT)
Facility: MEDICAL CENTER | Age: 44
End: 2024-01-19
Attending: ORTHOPAEDIC SURGERY | Admitting: ORTHOPAEDIC SURGERY
Payer: COMMERCIAL

## 2024-01-19 VITALS
SYSTOLIC BLOOD PRESSURE: 103 MMHG | RESPIRATION RATE: 21 BRPM | DIASTOLIC BLOOD PRESSURE: 59 MMHG | TEMPERATURE: 97.2 F | OXYGEN SATURATION: 94 % | BODY MASS INDEX: 25.54 KG/M2 | HEIGHT: 60 IN | WEIGHT: 130.07 LBS | HEART RATE: 78 BPM

## 2024-01-19 DIAGNOSIS — D16.11: ICD-10-CM

## 2024-01-19 DIAGNOSIS — M79.644 THUMB PAIN, RIGHT: ICD-10-CM

## 2024-01-19 PROBLEM — D16.9 OSTEOID OSTEOMA: Status: ACTIVE | Noted: 2024-01-19

## 2024-01-19 LAB — PATHOLOGY CONSULT NOTE: NORMAL

## 2024-01-19 PROCEDURE — 700105 HCHG RX REV CODE 258: Performed by: ANESTHESIOLOGY

## 2024-01-19 PROCEDURE — 700105 HCHG RX REV CODE 258: Performed by: ORTHOPAEDIC SURGERY

## 2024-01-19 PROCEDURE — 88311 DECALCIFY TISSUE: CPT

## 2024-01-19 PROCEDURE — 26210 REMOVAL OF FINGER LESION: CPT | Mod: 22,F5 | Performed by: ORTHOPAEDIC SURGERY

## 2024-01-19 PROCEDURE — 160025 RECOVERY II MINUTES (STATS): Performed by: ORTHOPAEDIC SURGERY

## 2024-01-19 PROCEDURE — 700102 HCHG RX REV CODE 250 W/ 637 OVERRIDE(OP): Performed by: ORTHOPAEDIC SURGERY

## 2024-01-19 PROCEDURE — 160048 HCHG OR STATISTICAL LEVEL 1-5: Performed by: ORTHOPAEDIC SURGERY

## 2024-01-19 PROCEDURE — C1713 ANCHOR/SCREW BN/BN,TIS/BN: HCPCS | Performed by: ORTHOPAEDIC SURGERY

## 2024-01-19 PROCEDURE — 700101 HCHG RX REV CODE 250: Performed by: ANESTHESIOLOGY

## 2024-01-19 PROCEDURE — 26210 REMOVAL OF FINGER LESION: CPT | Mod: AS,MISDOCU | Performed by: SPECIALIST

## 2024-01-19 PROCEDURE — 700111 HCHG RX REV CODE 636 W/ 250 OVERRIDE (IP): Performed by: ORTHOPAEDIC SURGERY

## 2024-01-19 PROCEDURE — 160035 HCHG PACU - 1ST 60 MINS PHASE I: Performed by: ORTHOPAEDIC SURGERY

## 2024-01-19 PROCEDURE — 160046 HCHG PACU - 1ST 60 MINS PHASE II: Performed by: ORTHOPAEDIC SURGERY

## 2024-01-19 PROCEDURE — 73140 X-RAY EXAM OF FINGER(S): CPT | Mod: RT

## 2024-01-19 PROCEDURE — RXMED WILLOW AMBULATORY MEDICATION CHARGE: Performed by: ORTHOPAEDIC SURGERY

## 2024-01-19 PROCEDURE — 160027 HCHG SURGERY MINUTES - 1ST 30 MINS LEVEL 2: Performed by: ORTHOPAEDIC SURGERY

## 2024-01-19 PROCEDURE — 160038 HCHG SURGERY MINUTES - EA ADDL 1 MIN LEVEL 2: Performed by: ORTHOPAEDIC SURGERY

## 2024-01-19 PROCEDURE — 700111 HCHG RX REV CODE 636 W/ 250 OVERRIDE (IP): Performed by: ANESTHESIOLOGY

## 2024-01-19 PROCEDURE — 88305 TISSUE EXAM BY PATHOLOGIST: CPT

## 2024-01-19 PROCEDURE — 160002 HCHG RECOVERY MINUTES (STAT): Performed by: ORTHOPAEDIC SURGERY

## 2024-01-19 PROCEDURE — 160009 HCHG ANES TIME/MIN: Performed by: ORTHOPAEDIC SURGERY

## 2024-01-19 PROCEDURE — A9270 NON-COVERED ITEM OR SERVICE: HCPCS | Performed by: ORTHOPAEDIC SURGERY

## 2024-01-19 DEVICE — GRAFT BONE CANCELLOUS FREEZE DRIED CHIPS ALLOSOURCE 15CC (1EA): Type: IMPLANTABLE DEVICE | Site: THUMB | Status: FUNCTIONAL

## 2024-01-19 RX ORDER — ONDANSETRON 4 MG/1
4 TABLET, FILM COATED ORAL EVERY 4 HOURS PRN
Qty: 15 TABLET | Refills: 0 | Status: SHIPPED | OUTPATIENT
Start: 2024-01-19 | End: 2024-01-26

## 2024-01-19 RX ORDER — CEFAZOLIN SODIUM 1 G/3ML
INJECTION, POWDER, FOR SOLUTION INTRAMUSCULAR; INTRAVENOUS PRN
Status: DISCONTINUED | OUTPATIENT
Start: 2024-01-19 | End: 2024-01-19 | Stop reason: SURG

## 2024-01-19 RX ORDER — SODIUM CHLORIDE, SODIUM LACTATE, POTASSIUM CHLORIDE, CALCIUM CHLORIDE 600; 310; 30; 20 MG/100ML; MG/100ML; MG/100ML; MG/100ML
INJECTION, SOLUTION INTRAVENOUS
Status: DISCONTINUED | OUTPATIENT
Start: 2024-01-19 | End: 2024-01-19 | Stop reason: SURG

## 2024-01-19 RX ORDER — MIDAZOLAM HYDROCHLORIDE 1 MG/ML
INJECTION INTRAMUSCULAR; INTRAVENOUS PRN
Status: DISCONTINUED | OUTPATIENT
Start: 2024-01-19 | End: 2024-01-19 | Stop reason: SURG

## 2024-01-19 RX ORDER — EPHEDRINE SULFATE 50 MG/ML
5 INJECTION, SOLUTION INTRAVENOUS
Status: DISCONTINUED | OUTPATIENT
Start: 2024-01-19 | End: 2024-01-19 | Stop reason: HOSPADM

## 2024-01-19 RX ORDER — KETOROLAC TROMETHAMINE 30 MG/ML
INJECTION, SOLUTION INTRAMUSCULAR; INTRAVENOUS PRN
Status: DISCONTINUED | OUTPATIENT
Start: 2024-01-19 | End: 2024-01-19 | Stop reason: HOSPADM

## 2024-01-19 RX ORDER — TRAMADOL HYDROCHLORIDE 50 MG/1
50 TABLET ORAL EVERY 6 HOURS PRN
Qty: 15 TABLET | Refills: 0 | Status: SHIPPED | OUTPATIENT
Start: 2024-01-19 | End: 2024-01-26

## 2024-01-19 RX ORDER — SODIUM CHLORIDE, SODIUM LACTATE, POTASSIUM CHLORIDE, CALCIUM CHLORIDE 600; 310; 30; 20 MG/100ML; MG/100ML; MG/100ML; MG/100ML
INJECTION, SOLUTION INTRAVENOUS CONTINUOUS
Status: DISCONTINUED | OUTPATIENT
Start: 2024-01-19 | End: 2024-01-19 | Stop reason: HOSPADM

## 2024-01-19 RX ORDER — EPHEDRINE SULFATE 50 MG/ML
INJECTION, SOLUTION INTRAVENOUS PRN
Status: DISCONTINUED | OUTPATIENT
Start: 2024-01-19 | End: 2024-01-19 | Stop reason: SURG

## 2024-01-19 RX ORDER — DEXAMETHASONE SODIUM PHOSPHATE 4 MG/ML
INJECTION, SOLUTION INTRA-ARTICULAR; INTRALESIONAL; INTRAMUSCULAR; INTRAVENOUS; SOFT TISSUE PRN
Status: DISCONTINUED | OUTPATIENT
Start: 2024-01-19 | End: 2024-01-19 | Stop reason: SURG

## 2024-01-19 RX ORDER — ONDANSETRON 2 MG/ML
4 INJECTION INTRAMUSCULAR; INTRAVENOUS
Status: DISCONTINUED | OUTPATIENT
Start: 2024-01-19 | End: 2024-01-19 | Stop reason: HOSPADM

## 2024-01-19 RX ORDER — HYDROMORPHONE HYDROCHLORIDE 1 MG/ML
0.1 INJECTION, SOLUTION INTRAMUSCULAR; INTRAVENOUS; SUBCUTANEOUS
Status: DISCONTINUED | OUTPATIENT
Start: 2024-01-19 | End: 2024-01-19 | Stop reason: HOSPADM

## 2024-01-19 RX ORDER — BUPIVACAINE HYDROCHLORIDE 2.5 MG/ML
INJECTION, SOLUTION EPIDURAL; INFILTRATION; INTRACAUDAL
Status: DISCONTINUED | OUTPATIENT
Start: 2024-01-19 | End: 2024-01-19 | Stop reason: HOSPADM

## 2024-01-19 RX ORDER — BACITRACIN ZINC 500 [USP'U]/G
OINTMENT TOPICAL
Status: DISCONTINUED
Start: 2024-01-19 | End: 2024-01-19 | Stop reason: HOSPADM

## 2024-01-19 RX ORDER — HYDRALAZINE HYDROCHLORIDE 20 MG/ML
5 INJECTION INTRAMUSCULAR; INTRAVENOUS
Status: DISCONTINUED | OUTPATIENT
Start: 2024-01-19 | End: 2024-01-19 | Stop reason: HOSPADM

## 2024-01-19 RX ORDER — LIDOCAINE HYDROCHLORIDE 20 MG/ML
INJECTION, SOLUTION EPIDURAL; INFILTRATION; INTRACAUDAL; PERINEURAL PRN
Status: DISCONTINUED | OUTPATIENT
Start: 2024-01-19 | End: 2024-01-19 | Stop reason: SURG

## 2024-01-19 RX ORDER — MEPERIDINE HYDROCHLORIDE 25 MG/ML
12.5 INJECTION INTRAMUSCULAR; INTRAVENOUS; SUBCUTANEOUS
Status: DISCONTINUED | OUTPATIENT
Start: 2024-01-19 | End: 2024-01-19 | Stop reason: HOSPADM

## 2024-01-19 RX ORDER — OXYCODONE HCL 5 MG/5 ML
10 SOLUTION, ORAL ORAL
Status: DISCONTINUED | OUTPATIENT
Start: 2024-01-19 | End: 2024-01-19 | Stop reason: HOSPADM

## 2024-01-19 RX ORDER — MIDAZOLAM HYDROCHLORIDE 1 MG/ML
1 INJECTION INTRAMUSCULAR; INTRAVENOUS
Status: DISCONTINUED | OUTPATIENT
Start: 2024-01-19 | End: 2024-01-19 | Stop reason: HOSPADM

## 2024-01-19 RX ORDER — DIPHENHYDRAMINE HYDROCHLORIDE 50 MG/ML
12.5 INJECTION INTRAMUSCULAR; INTRAVENOUS
Status: DISCONTINUED | OUTPATIENT
Start: 2024-01-19 | End: 2024-01-19 | Stop reason: HOSPADM

## 2024-01-19 RX ORDER — HYDROMORPHONE HYDROCHLORIDE 1 MG/ML
0.4 INJECTION, SOLUTION INTRAMUSCULAR; INTRAVENOUS; SUBCUTANEOUS
Status: DISCONTINUED | OUTPATIENT
Start: 2024-01-19 | End: 2024-01-19 | Stop reason: HOSPADM

## 2024-01-19 RX ORDER — LABETALOL HYDROCHLORIDE 5 MG/ML
5 INJECTION, SOLUTION INTRAVENOUS
Status: DISCONTINUED | OUTPATIENT
Start: 2024-01-19 | End: 2024-01-19 | Stop reason: HOSPADM

## 2024-01-19 RX ORDER — HYDROMORPHONE HYDROCHLORIDE 1 MG/ML
0.2 INJECTION, SOLUTION INTRAMUSCULAR; INTRAVENOUS; SUBCUTANEOUS
Status: DISCONTINUED | OUTPATIENT
Start: 2024-01-19 | End: 2024-01-19 | Stop reason: HOSPADM

## 2024-01-19 RX ORDER — ONDANSETRON 2 MG/ML
INJECTION INTRAMUSCULAR; INTRAVENOUS PRN
Status: DISCONTINUED | OUTPATIENT
Start: 2024-01-19 | End: 2024-01-19 | Stop reason: SURG

## 2024-01-19 RX ORDER — BUPIVACAINE HYDROCHLORIDE 2.5 MG/ML
INJECTION, SOLUTION EPIDURAL; INFILTRATION; INTRACAUDAL
Status: DISCONTINUED
Start: 2024-01-19 | End: 2024-01-19 | Stop reason: HOSPADM

## 2024-01-19 RX ORDER — BACITRACIN ZINC 500 [USP'U]/G
OINTMENT TOPICAL
Status: DISCONTINUED | OUTPATIENT
Start: 2024-01-19 | End: 2024-01-19 | Stop reason: HOSPADM

## 2024-01-19 RX ORDER — HALOPERIDOL 5 MG/ML
1 INJECTION INTRAMUSCULAR
Status: DISCONTINUED | OUTPATIENT
Start: 2024-01-19 | End: 2024-01-19 | Stop reason: HOSPADM

## 2024-01-19 RX ORDER — METOPROLOL TARTRATE 1 MG/ML
1 INJECTION, SOLUTION INTRAVENOUS
Status: DISCONTINUED | OUTPATIENT
Start: 2024-01-19 | End: 2024-01-19 | Stop reason: HOSPADM

## 2024-01-19 RX ORDER — OXYCODONE HCL 5 MG/5 ML
5 SOLUTION, ORAL ORAL
Status: DISCONTINUED | OUTPATIENT
Start: 2024-01-19 | End: 2024-01-19 | Stop reason: HOSPADM

## 2024-01-19 RX ORDER — MELOXICAM 15 MG/1
15 TABLET ORAL DAILY
Qty: 30 TABLET | Refills: 0 | Status: SHIPPED | OUTPATIENT
Start: 2024-01-19 | End: 2024-02-18

## 2024-01-19 RX ADMIN — EPHEDRINE SULFATE 10 MG: 50 INJECTION, SOLUTION INTRAVENOUS at 11:48

## 2024-01-19 RX ADMIN — MIDAZOLAM HYDROCHLORIDE 2 MG: 1 INJECTION, SOLUTION INTRAMUSCULAR; INTRAVENOUS at 11:42

## 2024-01-19 RX ADMIN — DEXAMETHASONE SODIUM PHOSPHATE 8 MG: 4 INJECTION INTRA-ARTICULAR; INTRALESIONAL; INTRAMUSCULAR; INTRAVENOUS; SOFT TISSUE at 11:45

## 2024-01-19 RX ADMIN — FENTANYL CITRATE 100 MCG: 50 INJECTION, SOLUTION INTRAMUSCULAR; INTRAVENOUS at 11:45

## 2024-01-19 RX ADMIN — PROPOFOL 150 MG: 10 INJECTION, EMULSION INTRAVENOUS at 11:45

## 2024-01-19 RX ADMIN — SODIUM CHLORIDE, POTASSIUM CHLORIDE, SODIUM LACTATE AND CALCIUM CHLORIDE: 600; 310; 30; 20 INJECTION, SOLUTION INTRAVENOUS at 10:08

## 2024-01-19 RX ADMIN — SODIUM CHLORIDE, POTASSIUM CHLORIDE, SODIUM LACTATE AND CALCIUM CHLORIDE: 600; 310; 30; 20 INJECTION, SOLUTION INTRAVENOUS at 11:40

## 2024-01-19 RX ADMIN — LIDOCAINE HYDROCHLORIDE 55 MG: 20 INJECTION, SOLUTION EPIDURAL; INFILTRATION; INTRACAUDAL at 11:45

## 2024-01-19 RX ADMIN — ONDANSETRON 8 MG: 2 INJECTION INTRAMUSCULAR; INTRAVENOUS at 12:28

## 2024-01-19 RX ADMIN — KETOROLAC TROMETHAMINE 15 MG: 30 INJECTION, SOLUTION INTRAMUSCULAR; INTRAVENOUS at 12:28

## 2024-01-19 RX ADMIN — CEFAZOLIN 2 G: 1 INJECTION, POWDER, FOR SOLUTION INTRAMUSCULAR; INTRAVENOUS at 11:45

## 2024-01-19 ASSESSMENT — ENCOUNTER SYMPTOMS
ABDOMINAL PAIN: 0
FEVER: 0
CHILLS: 0
SENSORY CHANGE: 0
MYALGIAS: 0
SHORTNESS OF BREATH: 0
WEIGHT LOSS: 0
WEAKNESS: 0

## 2024-01-19 ASSESSMENT — PAIN DESCRIPTION - PAIN TYPE
TYPE: SURGICAL PAIN

## 2024-01-19 ASSESSMENT — FIBROSIS 4 INDEX: FIB4 SCORE: .4810456929208346527

## 2024-01-19 ASSESSMENT — PAIN SCALES - GENERAL: PAIN_LEVEL: 0

## 2024-01-19 NOTE — OR NURSING
"1237 received patient from OR. Report from Anesthesiologist and OR RN. Patient on 6L at 99 % O2. VSS. Monitor connected. Oral airway in place. S/P Right thumb excision with curettage and bone grafting, dressing right thumb visualized, CDI. Color and cap refill WNL. Patietn can move fingers.    1245 Oral airway removed. Patient complains of no p[ain or nausea at this time     1250  notified about patients status and plan of care.  picking up patients medications from [facility] pharmacy.     1252 Patient tolerating oral fluids well.    1256 MD at bedside talking to patient.     1308  at bedside.    1331 phase 1 complete. Patient reports feeling \"dazed\", trying to rest with eyes closed. Able to communicate needs appropriately.     1348 Discharge instructions covered with , verbalizes understanding. All questions answered at this time.     1353 Patient up to restroom, voided without complication. Patient getting dressed.     1400 IV removed. Patient escorted out to responsible adult via wheelchair by RN. Belongings with patient, ambulated with steady gait. Discharge paperwork and instructions reviewed, signed, and sent with patient.   "

## 2024-01-19 NOTE — DISCHARGE INSTR - OTHER INFO
Future Appointments         Provider Department Center    1/29/2024 9:00 AM Tammy Valentino D.O. [facility] - Surgical Oncology           Discharge Instructions:    Diet: Resume your regular diet. Try to eat protein with every meal, this is important for healing and recovery. Drink plenty of water to stay hydrated and avoid constipation after surgery.    Restrictions: No weightbearing on the operative hand. No lifting or grabbing.       Pain control: You may take tylenol 1000 mg three times daily for baseline pain control or for mild pain. Do not take more then two doses daily if you have any liver disease or impairment. Take meloxicam 15 mg once daily. Always take with food and water. Discontinue if you get stomach upset or notice dark, tarry stools. Tramadol is a mild narcotic. You can take one tablet 50 mg every 6 hours as needed. Do not drive or operate machinery while taking this. Take a stool softener to prevent constipation. Zofran is an anti nausea medication you may take if you are nauseous from anesthesia or the pain meds.     Blood Clot Prevention: Walk daily at least 3 times for short distances around the house.    Wound care: Keep wound clean and dry. Keep splint on until follow up visit.     A referral was placed to hand therapy. Please call our office if you have not gotten a call to schedule with them. You can start therapy on 1/29/24 if possible.     Call if you develop fevers, chills, wound concerns or have any questions.

## 2024-01-19 NOTE — ANESTHESIA PREPROCEDURE EVALUATION
Case: [id number] Date/Time: 01/19/24 1115    Procedure: RIGHT THUMB EXCISION WITH CURETTAGE AND BONE GRAFTING (Thumb)    Anesthesia type: General    Pre-op diagnosis: THUMB OSTEOID OSTEOMA, RIGHT    Location: [facility] ROOM 21 / SURGERY SAME DAY [facility]    Surgeons: Tammy Valentino D.O.            Relevant Problems   Other   (positive) Thumb pain, right       Physical Exam    Airway   Mallampati: II  TM distance: >3 FB  Neck ROM: full       Cardiovascular - normal exam  Rhythm: regular  Rate: normal  (-) murmur     Dental - normal exam           Pulmonary - normal exam  Breath sounds clear to auscultation     Abdominal    Neurological - normal exam                   Anesthesia Plan    ASA 2       Plan - general       Airway plan will be LMA          Induction: intravenous    Postoperative Plan: Postoperative administration of opioids is intended.    Pertinent diagnostic labs and testing reviewed    Informed Consent:    Anesthetic plan and risks discussed with patient.    Use of blood products discussed with: patient whom consented to blood products.

## 2024-01-19 NOTE — OP REPORT
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  Her symptoms, exam and imaging findings are consistent with an osteoid osteoma.  She has been dealing with the pain for several years.  She has seen multiple surgeons for this problem and essentially been told by all of them that due to the location of the tumor she was not a candidate for ablative type procedures secondary to risk of damaging the joint and that surgical curettage and bone grafting was the main option other than observation.  I am in agreement with those opinions and presented those options to her as well.  She ultimately agreed to proceed with curettage and allograft bone grafting of the right thumb proximal phalanx osteoid osteoma.  A full PARQ was held preoperatively, she verbalized understanding was in agreement to proceed.    Operative Procedure: Right thumb proximal phalanx osteoid osteoma curettage and allograft bone grafting    Surgeon: Tammy Valentino D.O.    Assistant:  Naima Downing PA-C  A surgical assistant in this surgery was indicated due to the complexity of the procedure.  Their role included aiding in the positioning, approach, retraction, holding of devices, manipulation of the limb and closure of wounds.    Anesthesia: General LMA     Estimated Blood Loss:  <50 cc    Specimens: Right thumb osteoid osteoma - routine     Implants: Allograft cancellous bone chips    DESCRIPTION OF PROCEDURE:     The patient was transferred to the operating table and placed in the supine position. Pressure points were padded and the patient was secured to the table. Sequential compression garments were applied to the lower extremities. General anesthesia was induced and the patient was intubated by the anesthesia team.  The arm  was placed on the hand table.  The right upper extremity was prepped and draped in standard fashion. A time out was performed.     A dorsal ulnar incision was marked out over the distal proximal phalanx of the thumb.  Fluoroscopy was used to confirm the site of the osteoid osteoma.  A digital block was performed using 10 cc of 0.25% Marcaine.  A finger tourniquet was then placed using the cut fingers off of a small glove rolled up to the base of the thumb.  Incision was then made.  Electrocautery was used for hemostasis as dissection was carried down to the bone.  The periosteum over the lesion was incised with electrocautery.  The site was again confirmed with fluoroscopy and then the bone was entered using a high-speed bur.  Immediately I noticed there was a cavitary defect consistent with osteoid osteoma.  Curettes were used to take several samples of this bone and tissue.  I then performed margin expansion using the bur to enlarge the cavity being careful not to enter the joint.  Curettes were used to perform curettage as well and obtain tissue that was all collected and sent to pathology as a routine specimen.  Fluoroscopic images were obtained demonstrating the lesion had been completely curettage.  The wound was copiously irrigated with saline.  Allograft cancellous bone chips were crushed into smaller pieces and then packed into place.  Final fluoroscopic images were obtained in the AP and lateral planes.  The tourniquet was then removed.  There was brisk capillary refill to the thumb.  The wound was then closed in layers using 3-0 Monocryl and 3-0 nylon.  An additional 6 cc of 0.25% Marcaine was placed for a digital block.  Sterile dressings were then applied followed by thumb spica splint.      The patient was then awakened, extubated and transferred to the post-anesthesia care unit in stable condition.     Disposition: After recovery in the post anesthesia care unit the patient will discharge home. Weight  bearing restrictions: Nonweightbearing with the right hand. DVT prophylaxis: Instructed to ambulate 3 times daily. They will advance to a regular diet.  She has been referred to hand therapy to be initiated in 7-10 days.    Tammy Valentino DO  Orthopedic Oncologist

## 2024-01-19 NOTE — ANESTHESIA POSTPROCEDURE EVALUATION
Patient: Pauline Sharp    Procedure Summary       Date: 01/19/24 Room / Location: [facility] ROOM 21 / SURGERY SAME DAY [facility]    Anesthesia Start: 1140 Anesthesia Stop: 1238    Procedure: RIGHT THUMB EXCISION WITH CURETTAGE AND BONE GRAFTING (Right: Thumb) Diagnosis: (THUMB OSTEOID OSTEOMA, RIGHT)    Surgeons: Tammy Valentino D.O. Responsible Provider: Leroy Dang D.O.    Anesthesia Type: general ASA Status: 2            Final Anesthesia Type: general  Last vitals  BP   Blood Pressure: 110/65    Temp   36.2 °C (97.2 °F)    Pulse   89   Resp   15    SpO2   94 %      Anesthesia Post Evaluation    Patient location during evaluation: PACU  Patient participation: complete - patient participated  Level of consciousness: awake and alert  Pain score: 0    Airway patency: patent  Anesthetic complications: no  Cardiovascular status: hemodynamically stable  Respiratory status: acceptable  Hydration status: euvolemic    PONV: none          There were no known notable events for this encounter.     Nurse Pain Score: 0 (NPRS)

## 2024-01-19 NOTE — ANESTHESIA TIME REPORT
Anesthesia Start and Stop Event Times       Date Time Event    1/19/2024 1117 Ready for Procedure     1140 Anesthesia Start     1238 Anesthesia Stop          Responsible Staff  01/19/24      Name Role Begin End    Leroy Dang D.O. Anesth 1140 1238          Overtime Reason:  no overtime (within assigned shift)    Comments:

## 2024-01-19 NOTE — DISCHARGE INSTRUCTIONS
If any questions arise, call your provider.  If your provider is not available, please feel free to call the Surgical Center at [phone].    MEDICATIONS: Resume taking daily medication.  Take prescribed pain medication with food.  If no medication is prescribed, you may take non-aspirin pain medication if needed.  PAIN MEDICATION CAN BE VERY CONSTIPATING.  Take a stool softener or laxative such as senokot, pericolace, or milk of magnesia if needed.    Last pain medication given:  Toradol (like Ibuprofen/Motrin) given at 12:30 PM    What to Expect Post Anesthesia    Rest and take it easy for the first 24 hours.  A responsible adult is recommended to remain with you during that time.  It is normal to feel sleepy.  We encourage you to not do anything that requires balance, judgment or coordination.    FOR 24 HOURS DO NOT:  Drive, operate machinery or run household appliances.  Drink beer or alcoholic beverages.  Make important decisions or sign legal documents.    To avoid nausea, slowly advance diet as tolerated, avoiding spicy or greasy foods for the first day.  Add more substantial food to your diet according to your provider's instructions.  Babies can be fed formula or breast milk as soon as they are hungry.  INCREASE FLUIDS AND FIBER TO AVOID CONSTIPATION.    MILD FLU-LIKE SYMPTOMS ARE NORMAL.  YOU MAY EXPERIENCE GENERALIZED MUSCLE ACHES, THROAT IRRITATION, HEADACHE AND/OR SOME NAUSEA.

## 2024-01-19 NOTE — H&P
Date of Service  2024       HPI: Pauline presents for surgery today for excision of right thumb osteoid osteoma. She was last seen on 23. She denies any new medical diagnoses, medications or allergies. Her symptoms are unchanged from prior. She has been npo since midnight.     Past Medical History  Past Medical History:   Diagnosis Date    Blood in stool 10/13/2014    Health care maintenance 2015    Pain 2024    right thumb pain    Pregnant 23       Past Surgical History  Past Surgical History:   Procedure Laterality Date    REPEAT C SECTION N/A 2023    Procedure:  SECTION, REPEAT;  Surgeon: La Lacey M.D.;  Location: SURGERY LABOR AND DELIVERY;  Service: Labor and Delivery    PRIMARY C SECTION  2/10/2021    Procedure:  SECTION, PRIMARY;  Surgeon: La Lacey M.D.;  Location: SURGERY LABOR AND DELIVERY;  Service: Labor and Delivery    FINGER EXPLORATION Right 2018    Procedure: FINGER EXPLORATION-  THUMB INTERPHALANGEAL JOINT AND BIOPSY;  Surgeon: Braxton Aggarwal M.D.;  Location: SURGERY SAME DAY [facility];  Service: Orthopedics    [facility]         Allergies:   No Known Allergies    Problem List  Active Problems:    * No active hospital problems. *  Resolved Problems:    * No resolved hospital problems. *       Subjective  Review of Systems   Constitutional:  Negative for chills, fever and weight loss.   Respiratory:  Negative for shortness of breath.    Cardiovascular:  Negative for chest pain.   Gastrointestinal:  Negative for abdominal pain.   Musculoskeletal:  Positive for joint pain. Negative for myalgias.   Skin:  Negative for rash.   Neurological:  Negative for sensory change and weakness.         Objective  Temp:  [36.3 °C (97.4 °F)] 36.3 °C (97.4 °F)  Pulse:  [67] 67  Resp:  [18] 18  BP: (119)/(73) 119/73  SpO2:  [99 %] 99 %      Physical Exam  Constitutional:       Appearance: Normal appearance.   HENT:      Head:  Normocephalic and atraumatic.      Mouth/Throat:      Mouth: Mucous membranes are moist.      Pharynx: Oropharynx is clear.   Eyes:      Extraocular Movements: Extraocular movements intact.      Conjunctiva/sclera: Conjunctivae normal.   Cardiovascular:      Rate and Rhythm: Normal rate.      Pulses: Normal pulses.   Pulmonary:      Effort: Pulmonary effort is normal. No respiratory distress.      Breath sounds: No wheezing.   Musculoskeletal:      Cervical back: Normal range of motion and neck supple.      Comments: Right hand: There is a well-healed surgical scar over the IP joint the right thumb.  There is fullness of the thumb compared to the left.  There is limited flexion of the thumb IP joint with approximately 10-15 degrees of active range of motion.  There is tenderness to palpation over the proximal phalanx. Thumb flexion, extension and opposition intact.  Sensation intact to light touch radial and ulnar aspects of the digit.  Capillary refill less than 3 seconds.    Skin:     General: Skin is warm and dry.   Neurological:      General: No focal deficit present.      Mental Status: She is alert and oriented to person, place, and time.   Psychiatric:         Mood and Affect: Mood normal.         Behavior: Behavior normal.          Fluids  No intake or output data in the 24 hours ending 01/19/24 1104      Labs  Lab Results   Component Value Date/Time    WBC 6.1 01/17/2024 03:45 PM    RBC 4.24 01/17/2024 03:45 PM    HEMOGLOBIN 13.7 01/17/2024 03:45 PM    HEMATOCRIT 39.2 01/17/2024 03:45 PM    MCV 92.5 01/17/2024 03:45 PM    MCH 32.3 01/17/2024 03:45 PM    MCHC 34.9 01/17/2024 03:45 PM    MPV 10.1 01/17/2024 03:45 PM    NEUTSPOLYS 57.20 01/17/2024 03:45 PM    LYMPHOCYTES 32.50 01/17/2024 03:45 PM    MONOCYTES 7.70 01/17/2024 03:45 PM    EOSINOPHILS 1.80 01/17/2024 03:45 PM    BASOPHILS 0.50 01/17/2024 03:45 PM        Lab Results   Component Value Date/Time    SODIUM 139 01/17/2024 03:45 PM    POTASSIUM 3.9  01/17/2024 03:45 PM    CHLORIDE 104 01/17/2024 03:45 PM    CO2 24 01/17/2024 03:45 PM    GLUCOSE 95 01/17/2024 03:45 PM    BUN 19 01/17/2024 03:45 PM    CREATININE 0.67 01/17/2024 03:45 PM        Lab Results   Component Value Date/Time    PROTHROMBTM 13.2 01/17/2024 03:45 PM    INR 0.99 01/17/2024 03:45 PM        Recent Labs     01/17/24  1545   ASTSGOT 8*   ALTSGPT 7   TBILIRUBIN 0.2   GLOBULIN 2.5   INR 0.99           Assessment and Plan  Right thumb proximal phalanx osteoid osteoma   Plan for surgery today - excision right thumb proximal phalanx osteoid osteoma with allograft bone grafting     A full PARQ was held with the patient. Both surgical and non-operative options were discussed. They decided on surgery through shared decision making. We discussed risks include infection, blood loss, DVT/PE, damage to neurovascular structures, chronic pain, recurrence, stiffness, need for further surgery including IP fusion and unforeseeable events inherent to medical care. They also understand anesthesia will do a separate consent discussing risks inherent to anesthesia.They verbalized understanding and were in agreement to proceed with intralesional curettage and bone grafting right proximal phalanx osteoid osteoma.     Tammy Valentino, DO  Orthopedic Oncology

## 2024-01-19 NOTE — ANESTHESIA PROCEDURE NOTES
Airway    Date/Time: 1/19/2024 11:46 AM    Performed by: Leroy Dang D.O.  Authorized by: Leroy Dang D.O.    Location:  OR  Urgency:  Elective  Indications for Airway Management:  Anesthesia      Spontaneous Ventilation: absent    Sedation Level:  Deep  Preoxygenated: Yes    Mask Difficulty Assessment:  0 - not attempted  Final Airway Type:  Supraglottic airway  Final Supraglottic Airway:  Standard LMA    SGA Size:  4  Number of Attempts at Approach:  1

## 2024-01-19 NOTE — OR SURGEON
Immediate Post OP Note    PreOp Diagnosis: Right thumb proximal phalanx osteoid osteoma      PostOp Diagnosis: Same      Procedure(s):  RIGHT THUMB EXCISION WITH CURETTAGE AND BONE GRAFTING - Wound Class: Clean    Surgeon(s):  Tammy Valentino D.O.    Anesthesiologist/Type of Anesthesia:  Anesthesiologist: Leroy Dang D.O./General    Surgical Staff:  Circulator: Sophia Cardoso R.N.  Relief Circulator: Yancy Hidalgo R.N.  Relief Scrub: Gladys Goncalves R.N.  Scrub Person: Rinku Avila    Specimens removed if any:  ID Type Source Tests Collected by Time Destination   A : RIGHT THUMB OSTEOID OSTEOMA Other Other PATHOLOGY SPECIMEN Tammy Valentino D.O. 1/19/2024 12:05 PM        Estimated Blood Loss: <50 cc    Findings: see op note    Complications: none        1/19/2024 12:39 PM Tammy Valentino D.O.

## 2024-01-29 ENCOUNTER — APPOINTMENT (OUTPATIENT)
Dept: RADIOLOGY | Facility: IMAGING CENTER | Age: 44
End: 2024-01-29
Attending: ORTHOPAEDIC SURGERY
Payer: COMMERCIAL

## 2024-01-29 ENCOUNTER — OFFICE VISIT (OUTPATIENT)
Dept: SURGICAL ONCOLOGY | Facility: MEDICAL CENTER | Age: 44
End: 2024-01-29
Payer: COMMERCIAL

## 2024-01-29 VITALS
DIASTOLIC BLOOD PRESSURE: 62 MMHG | TEMPERATURE: 97.5 F | OXYGEN SATURATION: 95 % | HEART RATE: 71 BPM | HEIGHT: 60 IN | WEIGHT: 130 LBS | SYSTOLIC BLOOD PRESSURE: 124 MMHG | BODY MASS INDEX: 25.52 KG/M2

## 2024-01-29 DIAGNOSIS — D16.11: ICD-10-CM

## 2024-01-29 PROCEDURE — 73140 X-RAY EXAM OF FINGER(S): CPT | Mod: TC,RT | Performed by: ORTHOPAEDIC SURGERY

## 2024-01-29 PROCEDURE — 3074F SYST BP LT 130 MM HG: CPT | Performed by: ORTHOPAEDIC SURGERY

## 2024-01-29 PROCEDURE — 3078F DIAST BP <80 MM HG: CPT | Performed by: ORTHOPAEDIC SURGERY

## 2024-01-29 PROCEDURE — 99024 POSTOP FOLLOW-UP VISIT: CPT | Performed by: ORTHOPAEDIC SURGERY

## 2024-01-29 ASSESSMENT — ENCOUNTER SYMPTOMS
SHORTNESS OF BREATH: 0
ABDOMINAL PAIN: 0
WEIGHT LOSS: 0
SENSORY CHANGE: 0
MYALGIAS: 0
CHILLS: 0
FEVER: 0
WEAKNESS: 0

## 2024-01-29 ASSESSMENT — FIBROSIS 4 INDEX: FIB4 SCORE: .4810456929208346527

## 2024-01-29 NOTE — PROGRESS NOTES
Diagnosis:  Right thumb osteoid osteoma     Surgical Interventions: Excisional curettage and bone grafting right thumb osteoid osteoma 24    HPI: Patient is a 43-year-old female here for postoperative follow up after the above surgery.  She reports she is doing well and that the aching pain she felt previously is gone.  She does have some postsurgical pain that is different in nature and is taken some Tylenol and meloxicam which helped.  She has some decrease sensation to the thumb, but no complete numbness.  She has been using the splint as directed.  She has not had any hand therapy appointment scheduled as of yet. Denies fevers, chills, or wounds issues.    Past Medical History:   Past Medical History:   Diagnosis Date    Blood in stool 10/13/2014    Health care maintenance 2015    Pain 2024    right thumb pain    Pregnant 23       Past Surgical History:  Past Surgical History:   Procedure Laterality Date    CT EXCIS BENIGN BONE LESN,PHALANX Right 2024    Procedure: RIGHT THUMB EXCISION WITH CURETTAGE AND BONE GRAFTING;  Surgeon: Tammy Valentino D.O.;  Location: SURGERY SAME DAY [facility];  Service: Orthopedics    REPEAT C SECTION N/A 2023    Procedure:  SECTION, REPEAT;  Surgeon: La Lacey M.D.;  Location: SURGERY LABOR AND DELIVERY;  Service: Labor and Delivery    PRIMARY C SECTION  2/10/2021    Procedure:  SECTION, PRIMARY;  Surgeon: La Lacey M.D.;  Location: SURGERY LABOR AND DELIVERY;  Service: Labor and Delivery    FINGER EXPLORATION Right 2018    Procedure: FINGER EXPLORATION-  THUMB INTERPHALANGEAL JOINT AND BIOPSY;  Surgeon: Braxton Aggarwal M.D.;  Location: SURGERY SAME DAY [facility];  Service: Orthopedics    [facility]         Outpatient Encounter Medications as of 2024   Medication Sig Dispense Refill    meloxicam (MOBIC) 15 MG tablet Take 1 Tablet by mouth every day for 30 days. 30 Tablet 0    acetaminophen  (TYLENOL) 500 MG Tab Take 2 Tablets by mouth every 6 hours. 30 Tablet 0    vitamin D2, Ergocalciferol, (DRISDOL) 1.25 MG (34279 UT) Cap capsule Take 5,000 Units by mouth every 7 days.       No facility-administered encounter medications on file as of 1/29/2024.        Allergies:   No Known Allergies    Family History:   Family History   Problem Relation Age of Onset    Kidney Disease Father 65        no dialysis    Heart Failure Maternal Grandmother     Heart Disease Maternal Grandmother         chf    Alzheimer's Disease Maternal Grandmother     Thyroid Mother         hypothyroid    Hypertension Mother     Hyperlipidemia Mother     No Known Problems Sister     Alzheimer's Disease Paternal Grandmother     Lung Cancer Paternal Grandfather     Clotting Disorder Brother         DVT    Hypertension Brother     Hyperlipidemia Brother     GI Disease Brother         bowel obstruction    Diabetes Neg Hx        Social History:   Social History     Tobacco Use   Smoking Status Never    Passive exposure: Never   Smokeless Tobacco Never     Social History     Substance and Sexual Activity   Alcohol Use Not Currently    Comment: OCC     Social History     Substance and Sexual Activity   Drug Use Never     Social History     Socioeconomic History    Marital status:      Spouse name: Not on file    Number of children: Not on file    Years of education: Not on file    Highest education level: Not on file   Occupational History    Occupation: speech therapist   Tobacco Use    Smoking status: Never     Passive exposure: Never    Smokeless tobacco: Never   Vaping Use    Vaping Use: Never used   Substance and Sexual Activity    Alcohol use: Not Currently     Comment: OCC    Drug use: Never    Sexual activity: Yes     Partners: Male     Birth control/protection: Condom   Other Topics Concern    Not on file   Social History Narrative    Not on file     Social Determinants of Health     Financial Resource Strain: Low Risk   (4/7/2022)    Overall Financial Resource Strain (CARDIA)     Difficulty of Paying Living Expenses: Not hard at all   Food Insecurity: No Food Insecurity (4/7/2022)    Hunger Vital Sign     Worried About Running Out of Food in the Last Year: Never true     Ran Out of Food in the Last Year: Never true   Transportation Needs: No Transportation Needs (4/7/2022)    PRAPARE - Transportation     Lack of Transportation (Medical): No     Lack of Transportation (Non-Medical): No   Physical Activity: Insufficiently Active (4/7/2022)    Exercise Vital Sign     Days of Exercise per Week: 3 days     Minutes of Exercise per Session: 40 min   Stress: No Stress Concern Present (4/7/2022)    [employer] [place] [employer] - Occupational Stress Questionnaire     Feeling of Stress : Not at all   Social Connections: Moderately Isolated (4/7/2022)    Social Connection and Isolation Panel [NHANES]     Frequency of Communication with Friends and Family: More than three times a week     Frequency of Social Gatherings with Friends and Family: More than three times a week     Attends [employer] Services: Never     Active Member of Clubs or Organizations: No     Attends Club or Organization Meetings: Never     Marital Status:    Intimate Partner Violence: Not At Risk (4/7/2022)    Humiliation, Afraid, Rape, and Kick questionnaire     Fear of Current or Ex-Partner: No     Emotionally Abused: No     Physically Abused: No     Sexually Abused: No   Housing Stability: Low Risk  (4/7/2022)    Housing Stability Vital Sign     Unable to Pay for Housing in the Last Year: No     Number of Places Lived in the Last Year: 1     Unstable Housing in the Last Year: No       Review of Systems:  Review of Systems   Constitutional:  Negative for chills, fever and weight loss.   Respiratory:  Negative for shortness of breath.    Cardiovascular:  Negative for chest pain.   Gastrointestinal:  Negative for abdominal pain.   Musculoskeletal:  Negative  for joint pain and myalgias.   Skin:  Negative for rash.   Neurological:  Negative for sensory change and weakness.       Physical Exam:  /62 (BP Location: Left arm, Patient Position: Sitting)   Pulse 71   Temp 36.4 °C (97.5 °F) (Temporal)   Ht 1.524 m (5')   Wt 59 kg (130 lb)   LMP 12/18/2023 (Exact Date)   SpO2 95%   BMI 25.39 kg/m²     Physical Exam  Constitutional:       General: She is not in acute distress.     Appearance: Normal appearance. She is not ill-appearing.   HENT:      Head: Normocephalic and atraumatic.   Pulmonary:      Effort: Pulmonary effort is normal. No respiratory distress.      Breath sounds: No stridor. No wheezing.   Abdominal:      General: There is no distension.   Musculoskeletal:      Cervical back: Normal range of motion and neck supple.      Comments: Right hand: The splint was removed.  Wound is without erythema, dehiscence or drainage.  Sutures were removed.  Sensation intact light touch radial and ulnar aspects of the digit.  She can fully extend the digit at the MCP and IP joints.  She has only a couple degrees of flexion at the IP joint. Cap refill is brisk to the digit.    Skin:     General: Skin is warm and dry.      Capillary Refill: Capillary refill takes less than 2 seconds.   Neurological:      General: No focal deficit present.      Mental Status: She is alert and oriented to person, place, and time.   Psychiatric:         Mood and Affect: Mood normal.         Behavior: Behavior normal.         Imaging:  Two views of the right thumb demonstrates postoperative changes of curettage and bone grafting to the right thumb proximal phalanx osteoid osteoma.  No fractures or dislocations. IP joint of the thumb appears well-maintained      Assessment and Plan:   Patient is 10 days s/p curettage and bone grafting of a right thumb proximal phalanx osteoid osteoma. The tumor related pain is resolved per her report. She has some mild post op pain that should improve over  the coming weeks. I personally discussed her case with one of the hand therapists at [facility] and they will see her this week for gentle ROM and fabrication of a custom splint. She was given an OTC thumb spica splint today in the meantime. I reviewed her radiographs and pathology report with her in detail. All questions and concerns were answered.      Referrals: Hand therapy  Restrictions: Okay to use hand for ADLs otherwise no lifting with hand. Gentle passive and active ROM right thumb  New medications/refills: none  Imaging studies: two views right thumb  Follow-up: 3 weeks    Tammy Valentino DO  Orthopedics and Orthopedic Oncology

## 2024-02-22 ENCOUNTER — APPOINTMENT (OUTPATIENT)
Dept: RADIOLOGY | Facility: IMAGING CENTER | Age: 44
End: 2024-02-22
Attending: ORTHOPAEDIC SURGERY
Payer: COMMERCIAL

## 2024-02-22 ENCOUNTER — OFFICE VISIT (OUTPATIENT)
Dept: SURGICAL ONCOLOGY | Facility: MEDICAL CENTER | Age: 44
End: 2024-02-22
Payer: COMMERCIAL

## 2024-02-22 VITALS
OXYGEN SATURATION: 98 % | WEIGHT: 130 LBS | TEMPERATURE: 98.3 F | HEART RATE: 75 BPM | DIASTOLIC BLOOD PRESSURE: 70 MMHG | HEIGHT: 60 IN | SYSTOLIC BLOOD PRESSURE: 102 MMHG | BODY MASS INDEX: 25.52 KG/M2

## 2024-02-22 DIAGNOSIS — D16.11: ICD-10-CM

## 2024-02-22 PROCEDURE — 99024 POSTOP FOLLOW-UP VISIT: CPT | Performed by: ORTHOPAEDIC SURGERY

## 2024-02-22 PROCEDURE — 3074F SYST BP LT 130 MM HG: CPT | Performed by: ORTHOPAEDIC SURGERY

## 2024-02-22 PROCEDURE — 3078F DIAST BP <80 MM HG: CPT | Performed by: ORTHOPAEDIC SURGERY

## 2024-02-22 PROCEDURE — 73140 X-RAY EXAM OF FINGER(S): CPT | Mod: TC,RT | Performed by: ORTHOPAEDIC SURGERY

## 2024-02-22 ASSESSMENT — FIBROSIS 4 INDEX: FIB4 SCORE: .4810456929208346527

## 2024-02-23 ASSESSMENT — ENCOUNTER SYMPTOMS
WEIGHT LOSS: 0
SHORTNESS OF BREATH: 0
ABDOMINAL PAIN: 0
CHILLS: 0
SENSORY CHANGE: 0
WEAKNESS: 0
FEVER: 0
MYALGIAS: 0

## 2024-02-24 NOTE — PROGRESS NOTES
Diagnosis:  Right thumb osteoid osteoma     Surgical Interventions: Excisional curettage and bone grafting right thumb osteoid osteoma 24    HPI: Patient is a 43-year-old female here for postoperative follow up after the above surgery.  Since her last appointment she reports that her progress with therapy has been slow.  Her range of motion is increasing, she is just a little frustrated that has been slow.  She denies any numbness.  She reports there is some pain when doing therapy.  She also has some aching type pain, but she states it feels different from the tumor pain she had preoperatively.  She has been doing exercises at home as well and using the splint as directed.    Past Medical History:   Past Medical History:   Diagnosis Date    Blood in stool 10/13/2014    Health care maintenance 2015    Pain 2024    right thumb pain    Pregnant 23       Past Surgical History:  Past Surgical History:   Procedure Laterality Date    MO EXCIS BENIGN BONE LESN,PHALANX Right 2024    Procedure: RIGHT THUMB EXCISION WITH CURETTAGE AND BONE GRAFTING;  Surgeon: Tammy Valentino D.O.;  Location: SURGERY SAME DAY [facility];  Service: Orthopedics    REPEAT C SECTION N/A 2023    Procedure:  SECTION, REPEAT;  Surgeon: La Lacey M.D.;  Location: SURGERY LABOR AND DELIVERY;  Service: Labor and Delivery    PRIMARY C SECTION  2/10/2021    Procedure:  SECTION, PRIMARY;  Surgeon: La Lacey M.D.;  Location: SURGERY LABOR AND DELIVERY;  Service: Labor and Delivery    FINGER EXPLORATION Right 2018    Procedure: FINGER EXPLORATION-  THUMB INTERPHALANGEAL JOINT AND BIOPSY;  Surgeon: Braxton Aggarwal M.D.;  Location: SURGERY SAME DAY [facility];  Service: Orthopedics    [facility]         Outpatient Encounter Medications as of 2024   Medication Sig Dispense Refill    acetaminophen (TYLENOL) 500 MG Tab Take 2 Tablets by mouth every 6 hours. 30 Tablet 0     vitamin D2, Ergocalciferol, (DRISDOL) 1.25 MG (24488 UT) Cap capsule Take 5,000 Units by mouth every 7 days.       No facility-administered encounter medications on file as of 2/22/2024.        Allergies:   No Known Allergies    Family History:   Family History   Problem Relation Age of Onset    Kidney Disease Father 65        no dialysis    Heart Failure Maternal Grandmother     Heart Disease Maternal Grandmother         chf    Alzheimer's Disease Maternal Grandmother     Thyroid Mother         hypothyroid    Hypertension Mother     Hyperlipidemia Mother     No Known Problems Sister     Alzheimer's Disease Paternal Grandmother     Lung Cancer Paternal Grandfather     Clotting Disorder Brother         DVT    Hypertension Brother     Hyperlipidemia Brother     GI Disease Brother         bowel obstruction    Diabetes Neg Hx        Social History:   Social History     Tobacco Use   Smoking Status Never    Passive exposure: Never   Smokeless Tobacco Never     Social History     Substance and Sexual Activity   Alcohol Use Not Currently    Comment: OCC     Social History     Substance and Sexual Activity   Drug Use Never     Social History     Socioeconomic History    Marital status:      Spouse name: Not on file    Number of children: Not on file    Years of education: Not on file    Highest education level: Not on file   Occupational History    Occupation: speech therapist   Tobacco Use    Smoking status: Never     Passive exposure: Never    Smokeless tobacco: Never   Vaping Use    Vaping Use: Never used   Substance and Sexual Activity    Alcohol use: Not Currently     Comment: OCC    Drug use: Never    Sexual activity: Yes     Partners: Male     Birth control/protection: Condom   Other Topics Concern    Not on file   Social History Narrative    Not on file     Social Determinants of Health     Financial Resource Strain: Low Risk  (4/7/2022)    Overall Financial Resource Strain (CARDIA)     Difficulty of Paying  Living Expenses: Not hard at all   Food Insecurity: No Food Insecurity (4/7/2022)    Hunger Vital Sign     Worried About Running Out of Food in the Last Year: Never true     Ran Out of Food in the Last Year: Never true   Transportation Needs: No Transportation Needs (4/7/2022)    PRAPARE - Transportation     Lack of Transportation (Medical): No     Lack of Transportation (Non-Medical): No   Physical Activity: Insufficiently Active (4/7/2022)    Exercise Vital Sign     Days of Exercise per Week: 3 days     Minutes of Exercise per Session: 40 min   Stress: No Stress Concern Present (4/7/2022)    [employer] [place] [employer] - Occupational Stress Questionnaire     Feeling of Stress : Not at all   Social Connections: Moderately Isolated (4/7/2022)    Social Connection and Isolation Panel [NHANES]     Frequency of Communication with Friends and Family: More than three times a week     Frequency of Social Gatherings with Friends and Family: More than three times a week     Attends Alevism Services: Never     Active Member of Clubs or Organizations: No     Attends Club or Organization Meetings: Never     Marital Status:    Intimate Partner Violence: Not At Risk (4/7/2022)    Humiliation, Afraid, Rape, and Kick questionnaire     Fear of Current or Ex-Partner: No     Emotionally Abused: No     Physically Abused: No     Sexually Abused: No   Housing Stability: Low Risk  (4/7/2022)    Housing Stability Vital Sign     Unable to Pay for Housing in the Last Year: No     Number of Places Lived in the Last Year: 1     Unstable Housing in the Last Year: No       Review of Systems:  Review of Systems   Constitutional:  Negative for chills, fever and weight loss.   Respiratory:  Negative for shortness of breath.    Cardiovascular:  Negative for chest pain.   Gastrointestinal:  Negative for abdominal pain.   Musculoskeletal:  Negative for joint pain and myalgias.   Skin:  Negative for rash.   Neurological:   Negative for sensory change and weakness.       Physical Exam:  /70 (BP Location: Left arm, Patient Position: Sitting, BP Cuff Size: Small adult)   Pulse 75   Temp 36.8 °C (98.3 °F) (Temporal)   Ht 1.524 m (5')   Wt 59 kg (130 lb)   SpO2 98%   BMI 25.39 kg/m²     Physical Exam  Constitutional:       General: She is not in acute distress.     Appearance: Normal appearance. She is not ill-appearing.   HENT:      Head: Normocephalic and atraumatic.   Pulmonary:      Effort: Pulmonary effort is normal. No respiratory distress.      Breath sounds: No stridor. No wheezing.   Abdominal:      General: There is no distension.   Musculoskeletal:      Cervical back: Normal range of motion and neck supple.      Comments: Right hand: The wound is completely healed and scarred.  There is no surrounding erythema.  Sensation intact light touch radial and ulnar aspects of the digit.  She can fully extend the digit at the MCP and IP joints.  She has approximately 10 degrees of flexion at the IP joint. Cap refill is brisk to the digit.    Skin:     General: Skin is warm and dry.      Capillary Refill: Capillary refill takes less than 2 seconds.   Neurological:      General: No focal deficit present.      Mental Status: She is alert and oriented to person, place, and time.   Psychiatric:         Mood and Affect: Mood normal.         Behavior: Behavior normal.         Imaging:  Two views of the right thumb demonstrates postoperative changes of curettage and bone grafting to the right thumb proximal phalanx osteoid osteoma.  No fractures or dislocations. IP joint of the thumb appears well-maintained.  There is interval increase graft incorporation compared to prior radiographs.      Assessment and Plan:   Patient is ~1 month s/p curettage and bone grafting of a right thumb proximal phalanx osteoid osteoma. The tumor related pain is resolved per her report.  I did  her that she can take the meloxicam if needed to help  with some of the postoperative soreness.  We discussed continued scar massage and range of motion exercises.  She can discontinue the splint at this point.  She can begin more aggressive passive and active range of motion as well as strengthening.  I instructed her that therapy should not be painful, it is okay to push through some discomfort, but not through pain.      Referrals: Hand therapy  Restrictions: Okay for PROM and AROM. Progressive strengthening  New medications/refills: none  Imaging studies: two views right thumb  Follow-up: 4 weeks    Tammy Valentino DO  Orthopedics and Orthopedic Oncology

## 2024-03-25 ENCOUNTER — APPOINTMENT (OUTPATIENT)
Dept: RADIOLOGY | Facility: IMAGING CENTER | Age: 44
End: 2024-03-25
Attending: ORTHOPAEDIC SURGERY
Payer: COMMERCIAL

## 2024-03-25 ENCOUNTER — OFFICE VISIT (OUTPATIENT)
Dept: SURGICAL ONCOLOGY | Facility: MEDICAL CENTER | Age: 44
End: 2024-03-25
Payer: COMMERCIAL

## 2024-03-25 VITALS
WEIGHT: 130 LBS | DIASTOLIC BLOOD PRESSURE: 62 MMHG | OXYGEN SATURATION: 97 % | HEART RATE: 87 BPM | BODY MASS INDEX: 25.52 KG/M2 | SYSTOLIC BLOOD PRESSURE: 94 MMHG | HEIGHT: 60 IN | TEMPERATURE: 97 F

## 2024-03-25 DIAGNOSIS — D16.11: ICD-10-CM

## 2024-03-25 PROCEDURE — 3074F SYST BP LT 130 MM HG: CPT | Performed by: ORTHOPAEDIC SURGERY

## 2024-03-25 PROCEDURE — 99024 POSTOP FOLLOW-UP VISIT: CPT | Performed by: ORTHOPAEDIC SURGERY

## 2024-03-25 PROCEDURE — 73140 X-RAY EXAM OF FINGER(S): CPT | Mod: TC,RT | Performed by: ORTHOPAEDIC SURGERY

## 2024-03-25 PROCEDURE — 3078F DIAST BP <80 MM HG: CPT | Performed by: ORTHOPAEDIC SURGERY

## 2024-03-25 RX ORDER — MELOXICAM 7.5 MG/1
1 TABLET ORAL
COMMUNITY

## 2024-03-25 ASSESSMENT — FIBROSIS 4 INDEX: FIB4 SCORE: .4810456929208346527

## 2024-03-25 ASSESSMENT — ENCOUNTER SYMPTOMS
WEAKNESS: 0
SENSORY CHANGE: 0

## 2024-03-25 NOTE — PROGRESS NOTES
Diagnosis:  Right thumb osteoid osteoma     Surgical Interventions: Excisional curettage and bone grafting right thumb osteoid osteoma 24    HPI: Patient is a 43-year-old female here for postoperative follow up after the above surgery.  Since her last appointment she reports that her pain was not improving and she thought she was overdoing it with therapy. She took a week off and states her pain got a lot better to the point she was not taking any tylenol or NSAIDs. She denies numbness. ROM is stable.     Past Medical History:   Past Medical History:   Diagnosis Date    Blood in stool 10/13/2014    Health care maintenance 2015    Pain 2024    right thumb pain    Pregnant 23       Past Surgical History:  Past Surgical History:   Procedure Laterality Date    OR EXCIS BENIGN BONE LESN,PHALANX Right 2024    Procedure: RIGHT THUMB EXCISION WITH CURETTAGE AND BONE GRAFTING;  Surgeon: Tammy Valentino D.O.;  Location: SURGERY SAME DAY [facility];  Service: Orthopedics    REPEAT C SECTION N/A 2023    Procedure:  SECTION, REPEAT;  Surgeon: La Lacey M.D.;  Location: SURGERY LABOR AND DELIVERY;  Service: Labor and Delivery    PRIMARY C SECTION  2/10/2021    Procedure:  SECTION, PRIMARY;  Surgeon: La Lacey M.D.;  Location: SURGERY LABOR AND DELIVERY;  Service: Labor and Delivery    FINGER EXPLORATION Right 2018    Procedure: FINGER EXPLORATION-  THUMB INTERPHALANGEAL JOINT AND BIOPSY;  Surgeon: Braxton Aggarwal M.D.;  Location: SURGERY SAME DAY [facility];  Service: Orthopedics    [facility]         Outpatient Encounter Medications as of 3/25/2024   Medication Sig Dispense Refill    meloxicam (MOBIC) 7.5 MG Tab Take 1 Tablet by mouth every day.      acetaminophen (TYLENOL) 500 MG Tab Take 2 Tablets by mouth every 6 hours. 30 Tablet 0    vitamin D2, Ergocalciferol, (DRISDOL) 1.25 MG (01331 UT) Cap capsule Take 5,000 Units by mouth every 7 days.        No facility-administered encounter medications on file as of 3/25/2024.        Allergies:   No Known Allergies    Family History:   Family History   Problem Relation Age of Onset    Kidney Disease Father 65        no dialysis    Heart Failure Maternal Grandmother     Heart Disease Maternal Grandmother         chf    Alzheimer's Disease Maternal Grandmother     Thyroid Mother         hypothyroid    Hypertension Mother     Hyperlipidemia Mother     No Known Problems Sister     Alzheimer's Disease Paternal Grandmother     Lung Cancer Paternal Grandfather     Clotting Disorder Brother         DVT    Hypertension Brother     Hyperlipidemia Brother     GI Disease Brother         bowel obstruction    Diabetes Neg Hx        Social History:   Social History     Tobacco Use   Smoking Status Never    Passive exposure: Never   Smokeless Tobacco Never     Social History     Substance and Sexual Activity   Alcohol Use Not Currently    Comment: OCC     Social History     Substance and Sexual Activity   Drug Use Never     Social History     Socioeconomic History    Marital status:      Spouse name: Not on file    Number of children: Not on file    Years of education: Not on file    Highest education level: Not on file   Occupational History    Occupation: speech therapist   Tobacco Use    Smoking status: Never     Passive exposure: Never    Smokeless tobacco: Never   Vaping Use    Vaping Use: Never used   Substance and Sexual Activity    Alcohol use: Not Currently     Comment: OCC    Drug use: Never    Sexual activity: Yes     Partners: Male     Birth control/protection: Condom   Other Topics Concern    Not on file   Social History Narrative    Not on file     Social Determinants of Health     Financial Resource Strain: Low Risk  (4/7/2022)    Overall Financial Resource Strain (CARDIA)     Difficulty of Paying Living Expenses: Not hard at all   Food Insecurity: No Food Insecurity (4/7/2022)    Hunger Vital Sign      Worried About Running Out of Food in the Last Year: Never true     Ran Out of Food in the Last Year: Never true   Transportation Needs: No Transportation Needs (4/7/2022)    PRAPARE - Transportation     Lack of Transportation (Medical): No     Lack of Transportation (Non-Medical): No   Physical Activity: Insufficiently Active (4/7/2022)    Exercise Vital Sign     Days of Exercise per Week: 3 days     Minutes of Exercise per Session: 40 min   Stress: No Stress Concern Present (4/7/2022)    [employer] [place] [employer] - Occupational Stress Questionnaire     Feeling of Stress : Not at all   Social Connections: Moderately Isolated (4/7/2022)    Social Connection and Isolation Panel [NHANES]     Frequency of Communication with Friends and Family: More than three times a week     Frequency of Social Gatherings with Friends and Family: More than three times a week     Attends Roman Catholic Services: Never     Active Member of Clubs or Organizations: No     Attends Club or Organization Meetings: Never     Marital Status:    Intimate Partner Violence: Not At Risk (4/7/2022)    Humiliation, Afraid, Rape, and Kick questionnaire     Fear of Current or Ex-Partner: No     Emotionally Abused: No     Physically Abused: No     Sexually Abused: No   Housing Stability: Low Risk  (4/7/2022)    Housing Stability Vital Sign     Unable to Pay for Housing in the Last Year: No     Number of Places Lived in the Last Year: 1     Unstable Housing in the Last Year: No       Review of Systems:  Review of Systems   Musculoskeletal:  Negative for joint pain.   Neurological:  Negative for sensory change and weakness.       Physical Exam:  BP 94/62 (BP Location: Left arm, Patient Position: Sitting)   Pulse 87   Temp 36.1 °C (97 °F) (Temporal)   Ht 1.524 m (5')   Wt 59 kg (130 lb)   SpO2 97%   BMI 25.39 kg/m²     Physical Exam  Constitutional:       General: She is not in acute distress.     Appearance: Normal appearance. She is  "not ill-appearing.   HENT:      Head: Normocephalic and atraumatic.   Pulmonary:      Effort: Pulmonary effort is normal. No respiratory distress.      Breath sounds: No stridor. No wheezing.   Abdominal:      General: There is no distension.   Musculoskeletal:      Cervical back: Normal range of motion and neck supple.      Comments: Right hand: The wound is completely healed and scarred.  There is no surrounding erythema.  Sensation intact light touch radial and ulnar aspects of the digit.  She can fully extend the digit at the MCP and IP joints.  She has approximately 15 degrees of flexion at the IP joint. Cap refill is brisk to the digit.    Skin:     General: Skin is warm and dry.      Capillary Refill: Capillary refill takes less than 2 seconds.   Neurological:      General: No focal deficit present.      Mental Status: She is alert and oriented to person, place, and time.   Psychiatric:         Mood and Affect: Mood normal.         Behavior: Behavior normal.         Imaging:  Two views of the right thumb demonstrates postoperative changes of curettage and bone grafting to the right thumb proximal phalanx osteoid osteoma.  No fractures or dislocations. IP joint of the thumb appears well-maintained.  There is interval increase graft incorporation compared to prior radiographs and cortex regeneration.      Assessment and Plan:   Patient is ~2 month s/p curettage and bone grafting of a right thumb proximal phalanx osteoid osteoma. She is doing well and has no recurrent \"bone\" pain. I think she was overdoing it with therapy. Her ROM is stable and I don't think she will significantly improve her ROM with more therapy. We agreed to pause any further therapy for now and see how she does. She will call if things start to worsen.       Referrals: None  Restrictions: None  New medications/refills: none  Imaging studies: two views right thumb  Follow-up: 3 months    Tammy Valentino,   Orthopedics and Orthopedic Oncology "

## 2024-03-26 ENCOUNTER — APPOINTMENT (RX ONLY)
Dept: URBAN - METROPOLITAN AREA CLINIC 20 | Facility: CLINIC | Age: 44
Setting detail: DERMATOLOGY
End: 2024-03-26

## 2024-03-26 DIAGNOSIS — D18.0 HEMANGIOMA: ICD-10-CM

## 2024-03-26 DIAGNOSIS — Z87.2 PERSONAL HISTORY OF DISEASES OF THE SKIN AND SUBCUTANEOUS TISSUE: ICD-10-CM

## 2024-03-26 DIAGNOSIS — L81.1 CHLOASMA: ICD-10-CM

## 2024-03-26 DIAGNOSIS — L82.1 OTHER SEBORRHEIC KERATOSIS: ICD-10-CM

## 2024-03-26 DIAGNOSIS — D22 MELANOCYTIC NEVI: ICD-10-CM

## 2024-03-26 DIAGNOSIS — L57.8 OTHER SKIN CHANGES DUE TO CHRONIC EXPOSURE TO NONIONIZING RADIATION: ICD-10-CM

## 2024-03-26 DIAGNOSIS — L81.4 OTHER MELANIN HYPERPIGMENTATION: ICD-10-CM

## 2024-03-26 PROBLEM — D22.62 MELANOCYTIC NEVI OF LEFT UPPER LIMB, INCLUDING SHOULDER: Status: ACTIVE | Noted: 2024-03-26

## 2024-03-26 PROBLEM — D22.71 MELANOCYTIC NEVI OF RIGHT LOWER LIMB, INCLUDING HIP: Status: ACTIVE | Noted: 2024-03-26

## 2024-03-26 PROBLEM — D22.5 MELANOCYTIC NEVI OF TRUNK: Status: ACTIVE | Noted: 2024-03-26

## 2024-03-26 PROBLEM — D18.01 HEMANGIOMA OF SKIN AND SUBCUTANEOUS TISSUE: Status: ACTIVE | Noted: 2024-03-26

## 2024-03-26 PROBLEM — D22.61 MELANOCYTIC NEVI OF RIGHT UPPER LIMB, INCLUDING SHOULDER: Status: ACTIVE | Noted: 2024-03-26

## 2024-03-26 PROBLEM — D22.39 MELANOCYTIC NEVI OF OTHER PARTS OF FACE: Status: ACTIVE | Noted: 2024-03-26

## 2024-03-26 PROBLEM — D22.72 MELANOCYTIC NEVI OF LEFT LOWER LIMB, INCLUDING HIP: Status: ACTIVE | Noted: 2024-03-26

## 2024-03-26 PROBLEM — D48.5 NEOPLASM OF UNCERTAIN BEHAVIOR OF SKIN: Status: ACTIVE | Noted: 2024-03-26

## 2024-03-26 PROCEDURE — ? COUNSELING

## 2024-03-26 PROCEDURE — 11102 TANGNTL BX SKIN SINGLE LES: CPT

## 2024-03-26 PROCEDURE — ? BIOPSY BY SHAVE METHOD

## 2024-03-26 PROCEDURE — 99213 OFFICE O/P EST LOW 20 MIN: CPT | Mod: 25

## 2024-03-26 PROCEDURE — ? DIAGNOSIS COMMENT

## 2024-03-26 PROCEDURE — ? OBSERVATION AND MEASURE

## 2024-03-26 ASSESSMENT — LOCATION DETAILED DESCRIPTION DERM
LOCATION DETAILED: RIGHT ANTERIOR DISTAL THIGH
LOCATION DETAILED: LEFT MEDIAL BREAST 7-8:00 REGION
LOCATION DETAILED: LEFT PROXIMAL DORSAL FOREARM
LOCATION DETAILED: RIGHT SUPERIOR MEDIAL UPPER BACK
LOCATION DETAILED: RIGHT PROXIMAL DORSAL FOREARM
LOCATION DETAILED: LEFT ANTERIOR PROXIMAL UPPER ARM
LOCATION DETAILED: PERIUMBILICAL SKIN
LOCATION DETAILED: LEFT MEDIAL SUPERIOR CHEST
LOCATION DETAILED: RIGHT ANTERIOR PROXIMAL THIGH
LOCATION DETAILED: RIGHT MID-UPPER BACK
LOCATION DETAILED: LEFT FOREHEAD
LOCATION DETAILED: RIGHT FOREHEAD
LOCATION DETAILED: LEFT DISTAL POSTERIOR THIGH
LOCATION DETAILED: LEFT SUPERIOR LATERAL MIDBACK
LOCATION DETAILED: LEFT DISTAL POSTERIOR UPPER ARM
LOCATION DETAILED: NASAL ROOT
LOCATION DETAILED: RIGHT LATERAL BUTTOCK
LOCATION DETAILED: LEFT ANTERIOR DISTAL THIGH
LOCATION DETAILED: LEFT MEDIAL UPPER BACK
LOCATION DETAILED: RIGHT DISTAL POSTERIOR UPPER ARM
LOCATION DETAILED: RIGHT BUTTOCK
LOCATION DETAILED: RIGHT CENTRAL MANDIBULAR CHEEK
LOCATION DETAILED: RIGHT RADIAL DORSAL HAND
LOCATION DETAILED: LEFT INFERIOR CENTRAL MALAR CHEEK
LOCATION DETAILED: RIGHT ANTERIOR PROXIMAL UPPER ARM
LOCATION DETAILED: RIGHT CENTRAL MALAR CHEEK
LOCATION DETAILED: RIGHT DISTAL POSTERIOR THIGH
LOCATION DETAILED: RIGHT ANTERIOR LATERAL PROXIMAL THIGH
LOCATION DETAILED: RIGHT INFERIOR UPPER BACK

## 2024-03-26 ASSESSMENT — LOCATION SIMPLE DESCRIPTION DERM
LOCATION SIMPLE: LEFT CHEEK
LOCATION SIMPLE: RIGHT HAND
LOCATION SIMPLE: RIGHT CHEEK
LOCATION SIMPLE: LEFT THIGH
LOCATION SIMPLE: LEFT POSTERIOR THIGH
LOCATION SIMPLE: RIGHT FOREARM
LOCATION SIMPLE: RIGHT UPPER ARM
LOCATION SIMPLE: LEFT POSTERIOR UPPER ARM
LOCATION SIMPLE: RIGHT BUTTOCK
LOCATION SIMPLE: LEFT FOREHEAD
LOCATION SIMPLE: NOSE
LOCATION SIMPLE: RIGHT POSTERIOR UPPER ARM
LOCATION SIMPLE: LEFT UPPER BACK
LOCATION SIMPLE: RIGHT FOREHEAD
LOCATION SIMPLE: CHEST
LOCATION SIMPLE: LEFT BREAST
LOCATION SIMPLE: RIGHT POSTERIOR THIGH
LOCATION SIMPLE: LEFT UPPER ARM
LOCATION SIMPLE: LEFT FOREARM
LOCATION SIMPLE: ABDOMEN
LOCATION SIMPLE: RIGHT THIGH
LOCATION SIMPLE: RIGHT UPPER BACK
LOCATION SIMPLE: LEFT LOWER BACK

## 2024-03-26 ASSESSMENT — LOCATION ZONE DERM
LOCATION ZONE: LEG
LOCATION ZONE: FACE
LOCATION ZONE: HAND
LOCATION ZONE: TRUNK
LOCATION ZONE: NOSE
LOCATION ZONE: ARM

## 2024-04-06 ENCOUNTER — HOSPITAL ENCOUNTER (OUTPATIENT)
Dept: LAB | Facility: MEDICAL CENTER | Age: 44
End: 2024-04-06
Attending: PHYSICIAN ASSISTANT
Payer: COMMERCIAL

## 2024-04-06 LAB
25(OH)D3 SERPL-MCNC: 29 NG/ML (ref 30–100)
ALBUMIN SERPL BCP-MCNC: 4.3 G/DL (ref 3.2–4.9)
ALBUMIN/GLOB SERPL: 1.5 G/DL
ALP SERPL-CCNC: 71 U/L (ref 30–99)
ALT SERPL-CCNC: 13 U/L (ref 2–50)
ANION GAP SERPL CALC-SCNC: 11 MMOL/L (ref 7–16)
AST SERPL-CCNC: 11 U/L (ref 12–45)
BASOPHILS # BLD AUTO: 0.7 % (ref 0–1.8)
BASOPHILS # BLD: 0.03 K/UL (ref 0–0.12)
BILIRUB SERPL-MCNC: 0.3 MG/DL (ref 0.1–1.5)
BUN SERPL-MCNC: 16 MG/DL (ref 8–22)
CALCIUM ALBUM COR SERPL-MCNC: 9.2 MG/DL (ref 8.5–10.5)
CALCIUM SERPL-MCNC: 9.4 MG/DL (ref 8.5–10.5)
CHLORIDE SERPL-SCNC: 106 MMOL/L (ref 96–112)
CHOLEST SERPL-MCNC: 162 MG/DL (ref 100–199)
CO2 SERPL-SCNC: 23 MMOL/L (ref 20–33)
CREAT SERPL-MCNC: 0.74 MG/DL (ref 0.5–1.4)
EOSINOPHIL # BLD AUTO: 0.13 K/UL (ref 0–0.51)
EOSINOPHIL NFR BLD: 2.9 % (ref 0–6.9)
ERYTHROCYTE [DISTWIDTH] IN BLOOD BY AUTOMATED COUNT: 45.6 FL (ref 35.9–50)
FERRITIN SERPL-MCNC: 22.4 NG/ML (ref 10–291)
GFR SERPLBLD CREATININE-BSD FMLA CKD-EPI: 103 ML/MIN/1.73 M 2
GLOBULIN SER CALC-MCNC: 2.8 G/DL (ref 1.9–3.5)
GLUCOSE SERPL-MCNC: 92 MG/DL (ref 65–99)
HCT VFR BLD AUTO: 41.5 % (ref 37–47)
HDLC SERPL-MCNC: 38 MG/DL
HGB BLD-MCNC: 13.9 G/DL (ref 12–16)
IMM GRANULOCYTES # BLD AUTO: 0.01 K/UL (ref 0–0.11)
IMM GRANULOCYTES NFR BLD AUTO: 0.2 % (ref 0–0.9)
IRON SATN MFR SERPL: 23 % (ref 15–55)
IRON SERPL-MCNC: 61 UG/DL (ref 40–170)
LDLC SERPL CALC-MCNC: 102 MG/DL
LYMPHOCYTES # BLD AUTO: 1.67 K/UL (ref 1–4.8)
LYMPHOCYTES NFR BLD: 36.7 % (ref 22–41)
MCH RBC QN AUTO: 32 PG (ref 27–33)
MCHC RBC AUTO-ENTMCNC: 33.5 G/DL (ref 32.2–35.5)
MCV RBC AUTO: 95.4 FL (ref 81.4–97.8)
MONOCYTES # BLD AUTO: 0.4 K/UL (ref 0–0.85)
MONOCYTES NFR BLD AUTO: 8.8 % (ref 0–13.4)
NEUTROPHILS # BLD AUTO: 2.31 K/UL (ref 1.82–7.42)
NEUTROPHILS NFR BLD: 50.7 % (ref 44–72)
NRBC # BLD AUTO: 0 K/UL
NRBC BLD-RTO: 0 /100 WBC (ref 0–0.2)
PLATELET # BLD AUTO: 238 K/UL (ref 164–446)
PMV BLD AUTO: 10.6 FL (ref 9–12.9)
POTASSIUM SERPL-SCNC: 4.9 MMOL/L (ref 3.6–5.5)
PROT SERPL-MCNC: 7.1 G/DL (ref 6–8.2)
RBC # BLD AUTO: 4.35 M/UL (ref 4.2–5.4)
SODIUM SERPL-SCNC: 140 MMOL/L (ref 135–145)
T3FREE SERPL-MCNC: 3.53 PG/ML (ref 2–4.4)
T4 FREE SERPL-MCNC: 1.14 NG/DL (ref 0.93–1.7)
TIBC SERPL-MCNC: 266 UG/DL (ref 250–450)
TRIGL SERPL-MCNC: 112 MG/DL (ref 0–149)
TSH SERPL DL<=0.005 MIU/L-ACNC: 1.25 UIU/ML (ref 0.38–5.33)
UIBC SERPL-MCNC: 205 UG/DL (ref 110–370)
WBC # BLD AUTO: 4.6 K/UL (ref 4.8–10.8)

## 2024-04-06 PROCEDURE — 36415 COLL VENOUS BLD VENIPUNCTURE: CPT

## 2024-04-06 PROCEDURE — 84439 ASSAY OF FREE THYROXINE: CPT

## 2024-04-06 PROCEDURE — 82306 VITAMIN D 25 HYDROXY: CPT

## 2024-04-06 PROCEDURE — 83540 ASSAY OF IRON: CPT

## 2024-04-06 PROCEDURE — 84481 FREE ASSAY (FT-3): CPT

## 2024-04-06 PROCEDURE — 80061 LIPID PANEL: CPT

## 2024-04-06 PROCEDURE — 85025 COMPLETE CBC W/AUTO DIFF WBC: CPT

## 2024-04-06 PROCEDURE — 82728 ASSAY OF FERRITIN: CPT

## 2024-04-06 PROCEDURE — 84443 ASSAY THYROID STIM HORMONE: CPT

## 2024-04-06 PROCEDURE — 80053 COMPREHEN METABOLIC PANEL: CPT

## 2024-04-06 PROCEDURE — 83550 IRON BINDING TEST: CPT

## 2024-04-23 ENCOUNTER — APPOINTMENT (RX ONLY)
Dept: URBAN - METROPOLITAN AREA CLINIC 20 | Facility: CLINIC | Age: 44
Setting detail: DERMATOLOGY
End: 2024-04-23

## 2024-04-23 DIAGNOSIS — D22 MELANOCYTIC NEVI: ICD-10-CM

## 2024-04-23 PROBLEM — D48.5 NEOPLASM OF UNCERTAIN BEHAVIOR OF SKIN: Status: ACTIVE | Noted: 2024-04-23

## 2024-04-23 PROCEDURE — 12032 INTMD RPR S/A/T/EXT 2.6-7.5: CPT

## 2024-04-23 PROCEDURE — ? COUNSELING

## 2024-04-23 PROCEDURE — ? EXCISION

## 2024-04-23 PROCEDURE — 11402 EXC TR-EXT B9+MARG 1.1-2 CM: CPT

## 2024-04-23 ASSESSMENT — LOCATION ZONE DERM
LOCATION ZONE: LEG
LOCATION ZONE: LEG

## 2024-04-23 ASSESSMENT — LOCATION DETAILED DESCRIPTION DERM
LOCATION DETAILED: RIGHT ANTERIOR PROXIMAL THIGH
LOCATION DETAILED: RIGHT ANTERIOR LATERAL PROXIMAL THIGH

## 2024-04-23 ASSESSMENT — LOCATION SIMPLE DESCRIPTION DERM
LOCATION SIMPLE: RIGHT THIGH
LOCATION SIMPLE: RIGHT THIGH

## 2024-04-23 NOTE — PROCEDURE: EXCISION
Medical Necessity Information: It is in your best interest to select a reason for this procedure from the list below. All of these items fulfill various CMS LCD requirements except lesion extends to a margin.
Include Z78.9 (Other Specified Conditions Influencing Health Status) As An Associated Diagnosis?: No
Medical Necessity Clause: This procedure was medically necessary because the lesion that was treated was:
Lab: [id number]
Lab Facility: 
Previous Accession (Optional): [id number]
Date Of Previous Biopsy (Optional): 03/26/2024
Referring Physician (Optional): Amparo Carvalho
Biopsy Photograph Reviewed: Yes
Size Of Lesion In Cm: 0.8
X Size Of Lesion In Cm (Optional): 0
Size Of Margin In Cm: 0.3
Eye Clamp Note Details: An eye clamp was used during the procedure.
Excision Method: Elliptical
Saucerization Depth: dermis and superficial adipose tissue
Repair Type: Intermediate
Intermediate / Complex Repair - Final Wound Length In Cm: 4.2
Suturegard Retention Suture: 2-0 Nylon
Retention Suture Bite Size: 3 mm
Length To Time In Minutes Device Was In Place: 10
Number Of Hemigard Strips Per Side: 1
Undermining Type: Entire Wound
Debridement Text: The wound edges were debrided prior to proceeding with the closure to facilitate wound healing.
Helical Rim Text: The closure involved the helical rim.
Vermilion Border Text: The closure involved the vermilion border.
Nostril Rim Text: The closure involved the nostril rim.
Retention Suture Text: Retention sutures were placed to support the closure and prevent dehiscence.
Suture Removal: 14 days
Epidermal Closure Graft Donor Site (Optional): simple interrupted
Graft Donor Site Bandage (Optional-Leave Blank If You Don't Want In Note): Steri-strips and a pressure bandage were applied to the donor site.
Detail Level: Detailed
Excision Depth: adipose tissue
Scalpel Size: 15 blade
Anesthesia Type: 1% lidocaine with epinephrine
Additional Anesthesia Volume In Cc: 6
Hemostasis: Electrocautery
Estimated Blood Loss (Cc): minimal
Repair Depth: use same depth as excision depth
Anesthesia Type: 1% lidocaine with epinephrine and a 1:10 solution of 8.4% sodium bicarbonate
Deep Sutures: 4-0 Polysorb
Dermal Closure: buried vertical mattress
Epidermal Sutures: 4-0 Prolene
Epidermal Closure: running
Wound Care: Petrolatum
Dressing: dry sterile dressing
Suturegard Intro: Intraoperative tissue expansion was performed, utilizing the SUTUREGARD device, in order to reduce wound tension.
Suturegard Body: The suture ends were repeatedly re-tightened and re-clamped to achieve the desired tissue expansion.
Hemigard Intro: Due to skin fragility and wound tension, it was decided to use HEMIGARD adhesive retention suture devices to permit a linear closure. The skin was cleaned and dried for a 6cm distance away from the wound. Excessive hair, if present, was removed to allow for adhesion.
Hemigard Postcare Instructions: The HEMIGARD strips are to remain completely dry for at least 5-7 days.
Positioning (Leave Blank If You Do Not Want): The patient was placed in a comfortable position exposing the surgical site.
Complex Repair Preamble Text (Leave Blank If You Do Not Want): Extensive wide undermining was performed.
Intermediate Repair Preamble Text (Leave Blank If You Do Not Want): Undermining was performed with blunt dissection.
Fusiform Excision Additional Text (Leave Blank If You Do Not Want): The margin was drawn around the clinically apparent lesion.  A fusiform shape was then drawn on the skin incorporating the lesion and margins.  Incisions were then made along these lines to the appropriate tissue plane and the lesion was extirpated.
Eliptical Excision Additional Text (Leave Blank If You Do Not Want): The margin was drawn around the clinically apparent lesion.  An elliptical shape was then drawn on the skin incorporating the lesion and margins.  Incisions were then made along these lines to the appropriate tissue plane and the lesion was extirpated.
Saucerization Excision Additional Text (Leave Blank If You Do Not Want): The margin was drawn around the clinically apparent lesion.  Incisions were then made along these lines, in a tangential fashion, to the appropriate tissue plane and the lesion was extirpated.
Slit Excision Additional Text (Leave Blank If You Do Not Want): A linear line was drawn on the skin overlying the lesion. An incision was made slowly until the lesion was visualized.  Once visualized, the lesion was removed with blunt dissection.
Excisional Biopsy Additional Text (Leave Blank If You Do Not Want): The margin was drawn around the clinically apparent lesion. An elliptical shape was then drawn on the skin incorporating the lesion and margins.  Incisions were then made along these lines to the appropriate tissue plane and the lesion was extirpated.
Perilesional Excision Additional Text (Leave Blank If You Do Not Want): The margin was drawn around the clinically apparent lesion. Incisions were then made along these lines to the appropriate tissue plane and the lesion was extirpated.
Repair Performed By Another Provider Text (Leave Blank If You Do Not Want): After the tissue was excised the defect was repaired by another provider.
No Repair - Repaired With Adjacent Surgical Defect Text (Leave Blank If You Do Not Want): After the excision the defect was repaired concurrently with another surgical defect which was in close approximation.
Adjacent Tissue Transfer Text: The defect edges were debeveled with a #15 scalpel blade. Given the location of the defect and the proximity to free margins an adjacent tissue transfer was deemed most appropriate. Using a sterile surgical marker, an appropriate flap was drawn incorporating the defect and placing the expected incisions within the relaxed skin tension lines where possible. The area thus outlined was incised deep to adipose tissue with a #15 scalpel blade. The skin margins were undermined to an appropriate distance in all directions utilizing iris scissors and carried over to close the primary defect.
Advancement Flap (Single) Text: The defect edges were debeveled with a #15 scalpel blade.  Given the location of the defect and the proximity to free margins a single advancement flap was deemed most appropriate.  Using a sterile surgical marker, an appropriate advancement flap was drawn incorporating the defect and placing the expected incisions within the relaxed skin tension lines where possible.    The area thus outlined was incised deep to adipose tissue with a #15 scalpel blade.  The skin margins were undermined to an appropriate distance in all directions utilizing iris scissors.
Advancement Flap (Double) Text: The defect edges were debeveled with a #15 scalpel blade.  Given the location of the defect and the proximity to free margins a double advancement flap was deemed most appropriate.  Using a sterile surgical marker, the appropriate advancement flaps were drawn incorporating the defect and placing the expected incisions within the relaxed skin tension lines where possible.    The area thus outlined was incised deep to adipose tissue with a #15 scalpel blade.  The skin margins were undermined to an appropriate distance in all directions utilizing iris scissors.
Burow's Advancement Flap Text: The defect edges were debeveled with a #15 scalpel blade.  Given the location of the defect and the proximity to free margins a Burow's advancement flap was deemed most appropriate.  Using a sterile surgical marker, the appropriate advancement flap was drawn incorporating the defect and placing the expected incisions within the relaxed skin tension lines where possible.    The area thus outlined was incised deep to adipose tissue with a #15 scalpel blade.  The skin margins were undermined to an appropriate distance in all directions utilizing iris scissors.
Chonodrocutaneous Helical Advancement Flap Text: The defect edges were debeveled with a #15 scalpel blade.  Given the location of the defect and the proximity to free margins a chondrocutaneous helical advancement flap was deemed most appropriate.  Using a sterile surgical marker, the appropriate advancement flap was drawn incorporating the defect and placing the expected incisions within the relaxed skin tension lines where possible.    The area thus outlined was incised deep to adipose tissue with a #15 scalpel blade.  The skin margins were undermined to an appropriate distance in all directions utilizing iris scissors.
Crescentic Advancement Flap Text: The defect edges were debeveled with a #15 scalpel blade.  Given the location of the defect and the proximity to free margins a crescentic advancement flap was deemed most appropriate.  Using a sterile surgical marker, the appropriate advancement flap was drawn incorporating the defect and placing the expected incisions within the relaxed skin tension lines where possible.    The area thus outlined was incised deep to adipose tissue with a #15 scalpel blade.  The skin margins were undermined to an appropriate distance in all directions utilizing iris scissors.
A-T Advancement Flap Text: The defect edges were debeveled with a #15 scalpel blade.  Given the location of the defect, shape of the defect and the proximity to free margins an A-T advancement flap was deemed most appropriate.  Using a sterile surgical marker, an appropriate advancement flap was drawn incorporating the defect and placing the expected incisions within the relaxed skin tension lines where possible.    The area thus outlined was incised deep to adipose tissue with a #15 scalpel blade.  The skin margins were undermined to an appropriate distance in all directions utilizing iris scissors.
O-T Advancement Flap Text: The defect edges were debeveled with a #15 scalpel blade.  Given the location of the defect, shape of the defect and the proximity to free margins an O-T advancement flap was deemed most appropriate.  Using a sterile surgical marker, an appropriate advancement flap was drawn incorporating the defect and placing the expected incisions within the relaxed skin tension lines where possible.    The area thus outlined was incised deep to adipose tissue with a #15 scalpel blade.  The skin margins were undermined to an appropriate distance in all directions utilizing iris scissors.
O-L Flap Text: The defect edges were debeveled with a #15 scalpel blade.  Given the location of the defect, shape of the defect and the proximity to free margins an O-L flap was deemed most appropriate.  Using a sterile surgical marker, an appropriate advancement flap was drawn incorporating the defect and placing the expected incisions within the relaxed skin tension lines where possible.    The area thus outlined was incised deep to adipose tissue with a #15 scalpel blade.  The skin margins were undermined to an appropriate distance in all directions utilizing iris scissors.
O-Z Flap Text: The defect edges were debeveled with a #15 scalpel blade.  Given the location of the defect, shape of the defect and the proximity to free margins an O-Z flap was deemed most appropriate.  Using a sterile surgical marker, an appropriate transposition flap was drawn incorporating the defect and placing the expected incisions within the relaxed skin tension lines where possible. The area thus outlined was incised deep to adipose tissue with a #15 scalpel blade.  The skin margins were undermined to an appropriate distance in all directions utilizing iris scissors.
Double O-Z Flap Text: The defect edges were debeveled with a #15 scalpel blade.  Given the location of the defect, shape of the defect and the proximity to free margins a Double O-Z flap was deemed most appropriate.  Using a sterile surgical marker, an appropriate transposition flap was drawn incorporating the defect and placing the expected incisions within the relaxed skin tension lines where possible. The area thus outlined was incised deep to adipose tissue with a #15 scalpel blade.  The skin margins were undermined to an appropriate distance in all directions utilizing iris scissors.
V-Y Flap Text: The defect edges were debeveled with a #15 scalpel blade.  Given the location of the defect, shape of the defect and the proximity to free margins a V-Y flap was deemed most appropriate.  Using a sterile surgical marker, an appropriate advancement flap was drawn incorporating the defect and placing the expected incisions within the relaxed skin tension lines where possible.    The area thus outlined was incised deep to adipose tissue with a #15 scalpel blade.  The skin margins were undermined to an appropriate distance in all directions utilizing iris scissors.
Advancement-Rotation Flap Text: The defect edges were debeveled with a #15 scalpel blade. Given the location of the defect, shape of the defect and the proximity to free margins an advancement-rotation flap was deemed most appropriate. Using a sterile surgical marker, an appropriate flap was drawn incorporating the defect and placing the expected incisions within the relaxed skin tension lines where possible. The area thus outlined was incised deep to adipose tissue with a #15 scalpel blade. The skin margins were undermined to an appropriate distance in all directions utilizing iris scissors. Following this, the designed flap was carried over into the primary defect and sutured into place.
Mercedes Flap Text: The defect edges were debeveled with a #15 scalpel blade.  Given the location of the defect, shape of the defect and the proximity to free margins a Mercedes flap was deemed most appropriate.  Using a sterile surgical marker, an appropriate advancement flap was drawn incorporating the defect and placing the expected incisions within the relaxed skin tension lines where possible. The area thus outlined was incised deep to adipose tissue with a #15 scalpel blade.  The skin margins were undermined to an appropriate distance in all directions utilizing iris scissors.
Modified Advancement Flap Text: The defect edges were debeveled with a #15 scalpel blade.  Given the location of the defect, shape of the defect and the proximity to free margins a modified advancement flap was deemed most appropriate.  Using a sterile surgical marker, an appropriate advancement flap was drawn incorporating the defect and placing the expected incisions within the relaxed skin tension lines where possible.    The area thus outlined was incised deep to adipose tissue with a #15 scalpel blade.  The skin margins were undermined to an appropriate distance in all directions utilizing iris scissors.
Mucosal Advancement Flap Text: Given the location of the defect, shape of the defect and the proximity to free margins a mucosal advancement flap was deemed most appropriate. Incisions were made with a 15 blade scalpel in the appropriate fashion along the cutaneous vermillion border and the mucosal lip. The remaining actinically damaged mucosal tissue was excised.  The mucosal advancement flap was then elevated to the gingival sulcus with care taken to preserve the neurovascular structures and advanced into the primary defect. Care was taken to ensure that precise realignment of the vermilion border was achieved.
Peng Advancement Flap Text: The defect edges were debeveled with a #15 scalpel blade. Given the location of the defect, shape of the defect and the proximity to free margins a Peng advancement flap was deemed most appropriate. Using a sterile surgical marker, an appropriate advancement flap was drawn incorporating the defect and placing the expected incisions within the relaxed skin tension lines where possible. The area thus outlined was incised deep to adipose tissue with a #15 scalpel blade. The skin margins were undermined to an appropriate distance in all directions utilizing iris scissors. Following this, the designed flap was advanced and carried over into the primary defect and sutured into place.
Hatchet Flap Text: The defect edges were debeveled with a #15 scalpel blade.  Given the location of the defect, shape of the defect and the proximity to free margins a hatchet flap was deemed most appropriate.  Using a sterile surgical marker, an appropriate hatchet flap was drawn incorporating the defect and placing the expected incisions within the relaxed skin tension lines where possible.    The area thus outlined was incised deep to adipose tissue with a #15 scalpel blade.  The skin margins were undermined to an appropriate distance in all directions utilizing iris scissors.
Rotation Flap Text: The defect edges were debeveled with a #15 scalpel blade.  Given the location of the defect, shape of the defect and the proximity to free margins a rotation flap was deemed most appropriate.  Using a sterile surgical marker, an appropriate rotation flap was drawn incorporating the defect and placing the expected incisions within the relaxed skin tension lines where possible.    The area thus outlined was incised deep to adipose tissue with a #15 scalpel blade.  The skin margins were undermined to an appropriate distance in all directions utilizing iris scissors.
Bilateral Rotation Flap Text: The defect edges were debeveled with a #15 scalpel blade. Given the location of the defect, shape of the defect and the proximity to free margins a bilateral rotation flap was deemed most appropriate. Using a sterile surgical marker, an appropriate rotation flap was drawn incorporating the defect and placing the expected incisions within the relaxed skin tension lines where possible. The area thus outlined was incised deep to adipose tissue with a #15 scalpel blade. The skin margins were undermined to an appropriate distance in all directions utilizing iris scissors. Following this, the designed flap was carried over into the primary defect and sutured into place.
Spiral Flap Text: The defect edges were debeveled with a #15 scalpel blade.  Given the location of the defect, shape of the defect and the proximity to free margins a spiral flap was deemed most appropriate.  Using a sterile surgical marker, an appropriate rotation flap was drawn incorporating the defect and placing the expected incisions within the relaxed skin tension lines where possible. The area thus outlined was incised deep to adipose tissue with a #15 scalpel blade.  The skin margins were undermined to an appropriate distance in all directions utilizing iris scissors.
Staged Advancement Flap Text: The defect edges were debeveled with a #15 scalpel blade. Given the location of the defect, shape of the defect and the proximity to free margins a staged advancement flap was deemed most appropriate. Using a sterile surgical marker, an appropriate advancement flap was drawn incorporating the defect and placing the expected incisions within the relaxed skin tension lines where possible. The area thus outlined was incised deep to adipose tissue with a #15 scalpel blade. The skin margins were undermined to an appropriate distance in all directions utilizing iris scissors. Following this, the designed flap was carried over into the primary defect and sutured into place.
Star Wedge Flap Text: The defect edges were debeveled with a #15 scalpel blade.  Given the location of the defect, shape of the defect and the proximity to free margins a star wedge flap was deemed most appropriate.  Using a sterile surgical marker, an appropriate rotation flap was drawn incorporating the defect and placing the expected incisions within the relaxed skin tension lines where possible. The area thus outlined was incised deep to adipose tissue with a #15 scalpel blade.  The skin margins were undermined to an appropriate distance in all directions utilizing iris scissors.
Transposition Flap Text: The defect edges were debeveled with a #15 scalpel blade.  Given the location of the defect and the proximity to free margins a transposition flap was deemed most appropriate.  Using a sterile surgical marker, an appropriate transposition flap was drawn incorporating the defect.    The area thus outlined was incised deep to adipose tissue with a #15 scalpel blade.  The skin margins were undermined to an appropriate distance in all directions utilizing iris scissors.
Muscle Hinge Flap Text: The defect edges were debeveled with a #15 scalpel blade.  Given the size, depth and location of the defect and the proximity to free margins a muscle hinge flap was deemed most appropriate.  Using a sterile surgical marker, an appropriate hinge flap was drawn incorporating the defect. The area thus outlined was incised with a #15 scalpel blade.  The skin margins were undermined to an appropriate distance in all directions utilizing iris scissors.
Mustarde Flap Text: The defect edges were debeveled with a #15 scalpel blade.  Given the size, depth and location of the defect and the proximity to free margins a Mustarde flap was deemed most appropriate. Using a sterile surgical marker, an appropriate flap was drawn incorporating the defect. The area thus outlined was incised with a #15 scalpel blade. The skin margins were undermined to an appropriate distance in all directions utilizing iris scissors. Following this, the designed flap was carried into the primary defect and sutured into place.
Nasal Turnover Hinge Flap Text: The defect edges were debeveled with a #15 scalpel blade.  Given the size, depth, location of the defect and the defect being full thickness a nasal turnover hinge flap was deemed most appropriate. Using a sterile surgical marker, an appropriate hinge flap was drawn incorporating the defect. The area thus outlined was incised with a #15 scalpel blade. The flap was designed to recreate the nasal mucosal lining and the alar rim. The skin margins were undermined to an appropriate distance in all directions utilizing iris scissors. Following this, the designed flap was carried over into the primary defect and sutured into place
Nasalis-Muscle-Based Myocutaneous Island Pedicle Flap Text: Using a #15 blade, an incision was made around the donor flap to the level of the nasalis muscle. Wide lateral undermining was then performed in both the subcutaneous plane above the nasalis muscle, and in a submuscular plane just above periosteum. This allowed the formation of a free nasalis muscle axial pedicle (based on the angular artery) which was still attached to the actual cutaneous flap, increasing its mobility and vascular viability. Hemostasis was obtained with pinpoint electrocoagulation. The flap was mobilized into position and the pivotal anchor points positioned and stabilized with buried interrupted sutures. Subcutaneous and dermal tissues were closed in a multilayered fashion with sutures. Tissue redundancies were excised, and the epidermal edges were apposed without significant tension and sutured with sutures.
Nasalis Myocutaneous Flap Text: Using a #15 blade, an incision was made around the donor flap to the level of the nasalis muscle. Wide lateral undermining was then performed in both the subcutaneous plane above the nasalis muscle, and in a submuscular plane just above periosteum. This allowed the formation of a free nasalis muscle axial pedicle which was still attached to the actual cutaneous flap, increasing its mobility and vascular viability. Hemostasis was obtained with pinpoint electrocoagulation. The flap was mobilized into position and the pivotal anchor points positioned and stabilized with buried interrupted sutures. Subcutaneous and dermal tissues were closed in a multilayered fashion with sutures. Tissue redundancies were excised, and the epidermal edges were apposed without significant tension and sutured with sutures.
Orbicularis Oris Muscle Flap Text: The defect edges were debeveled with a #15 scalpel blade.  Given that the defect affected the competency of the oral sphincter an orbicularis oris muscle flap was deemed most appropriate to restore this competency and normal muscle function.  Using a sterile surgical marker, an appropriate flap was drawn incorporating the defect. The area thus outlined was incised with a #15 scalpel blade. Following this, the designed flap was carried over into the primary defect and sutured into place.
Melolabial Transposition Flap Text: The defect edges were debeveled with a #15 scalpel blade.  Given the location of the defect and the proximity to free margins a melolabial flap was deemed most appropriate.  Using a sterile surgical marker, an appropriate melolabial transposition flap was drawn incorporating the defect.    The area thus outlined was incised deep to adipose tissue with a #15 scalpel blade.  The skin margins were undermined to an appropriate distance in all directions utilizing iris scissors.
Rectangular Flap Text: The defect edges were debeveled with a #15 scalpel blade. Given the location of the defect and the proximity to free margins a rectangular flap was deemed most appropriate. Using a sterile surgical marker, an appropriate rectangular flap was drawn incorporating the defect. The area thus outlined was incised deep to adipose tissue with a #15 scalpel blade. The skin margins were undermined to an appropriate distance in all directions utilizing iris scissors. Following this, the designed flap was carried over into the primary defect and sutured into place.
Rhombic Flap Text: The defect edges were debeveled with a #15 scalpel blade.  Given the location of the defect and the proximity to free margins a rhombic flap was deemed most appropriate.  Using a sterile surgical marker, an appropriate rhombic flap was drawn incorporating the defect.    The area thus outlined was incised deep to adipose tissue with a #15 scalpel blade.  The skin margins were undermined to an appropriate distance in all directions utilizing iris scissors.
Rhomboid Transposition Flap Text: The defect edges were debeveled with a #15 scalpel blade.  Given the location of the defect and the proximity to free margins a rhomboid transposition flap was deemed most appropriate.  Using a sterile surgical marker, an appropriate rhomboid flap was drawn incorporating the defect.    The area thus outlined was incised deep to adipose tissue with a #15 scalpel blade.  The skin margins were undermined to an appropriate distance in all directions utilizing iris scissors.
Bi-Rhombic Flap Text: The defect edges were debeveled with a #15 scalpel blade.  Given the location of the defect and the proximity to free margins a bi-rhombic flap was deemed most appropriate.  Using a sterile surgical marker, an appropriate rhombic flap was drawn incorporating the defect. The area thus outlined was incised deep to adipose tissue with a #15 scalpel blade.  The skin margins were undermined to an appropriate distance in all directions utilizing iris scissors.
Helical Rim Advancement Flap Text: The defect edges were debeveled with a #15 blade scalpel.  Given the location of the defect and the proximity to free margins (helical rim) a double helical rim advancement flap was deemed most appropriate.  Using a sterile surgical marker, the appropriate advancement flaps were drawn incorporating the defect and placing the expected incisions between the helical rim and antihelix where possible.  The area thus outlined was incised through and through with a #15 scalpel blade.  With a skin hook and iris scissors, the flaps were gently and sharply undermined and freed up.
Bilateral Helical Rim Advancement Flap Text: The defect edges were debeveled with a #15 blade scalpel.  Given the location of the defect and the proximity to free margins (helical rim) a bilateral helical rim advancement flap was deemed most appropriate.  Using a sterile surgical marker, the appropriate advancement flaps were drawn incorporating the defect and placing the expected incisions between the helical rim and antihelix where possible.  The area thus outlined was incised through and through with a #15 scalpel blade.  With a skin hook and iris scissors, the flaps were gently and sharply undermined and freed up.
Ear Star Wedge Flap Text: The defect edges were debeveled with a #15 blade scalpel.  Given the location of the defect and the proximity to free margins (helical rim) an ear star wedge flap was deemed most appropriate.  Using a sterile surgical marker, the appropriate flap was drawn incorporating the defect and placing the expected incisions between the helical rim and antihelix where possible.  The area thus outlined was incised through and through with a #15 scalpel blade.
Banner Transposition Flap Text: The defect edges were debeveled with a #15 scalpel blade.  Given the location of the defect and the proximity to free margins a Banner transposition flap was deemed most appropriate.  Using a sterile surgical marker, an appropriate flap drawn around the defect. The area thus outlined was incised deep to adipose tissue with a #15 scalpel blade.  The skin margins were undermined to an appropriate distance in all directions utilizing iris scissors.
Bilobed Flap Text: The defect edges were debeveled with a #15 scalpel blade.  Given the location of the defect and the proximity to free margins a bilobe flap was deemed most appropriate.  Using a sterile surgical marker, an appropriate bilobe flap drawn around the defect.    The area thus outlined was incised deep to adipose tissue with a #15 scalpel blade.  The skin margins were undermined to an appropriate distance in all directions utilizing iris scissors.
Bilobed Transposition Flap Text: The defect edges were debeveled with a #15 scalpel blade.  Given the location of the defect and the proximity to free margins a bilobed transposition flap was deemed most appropriate.  Using a sterile surgical marker, an appropriate bilobe flap drawn around the defect.    The area thus outlined was incised deep to adipose tissue with a #15 scalpel blade.  The skin margins were undermined to an appropriate distance in all directions utilizing iris scissors.
Trilobed Flap Text: The defect edges were debeveled with a #15 scalpel blade.  Given the location of the defect and the proximity to free margins a trilobed flap was deemed most appropriate.  Using a sterile surgical marker, an appropriate trilobed flap drawn around the defect.    The area thus outlined was incised deep to adipose tissue with a #15 scalpel blade.  The skin margins were undermined to an appropriate distance in all directions utilizing iris scissors.
Dorsal Nasal Flap Text: The defect edges were debeveled with a #15 scalpel blade.  Given the location of the defect and the proximity to free margins a dorsal nasal flap was deemed most appropriate.  Using a sterile surgical marker, an appropriate dorsal nasal flap was drawn around the defect.    The area thus outlined was incised deep to adipose tissue with a #15 scalpel blade.  The skin margins were undermined to an appropriate distance in all directions utilizing iris scissors.
Island Pedicle Flap Text: The defect edges were debeveled with a #15 scalpel blade.  Given the location of the defect, shape of the defect and the proximity to free margins an island pedicle advancement flap was deemed most appropriate.  Using a sterile surgical marker, an appropriate advancement flap was drawn incorporating the defect, outlining the appropriate donor tissue and placing the expected incisions within the relaxed skin tension lines where possible.    The area thus outlined was incised deep to adipose tissue with a #15 scalpel blade.  The skin margins were undermined to an appropriate distance in all directions around the primary defect and laterally outward around the island pedicle utilizing iris scissors.  There was minimal undermining beneath the pedicle flap.
Island Pedicle Flap With Canthal Suspension Text: The defect edges were debeveled with a #15 scalpel blade.  Given the location of the defect, shape of the defect and the proximity to free margins an island pedicle advancement flap was deemed most appropriate.  Using a sterile surgical marker, an appropriate advancement flap was drawn incorporating the defect, outlining the appropriate donor tissue and placing the expected incisions within the relaxed skin tension lines where possible. The area thus outlined was incised deep to adipose tissue with a #15 scalpel blade.  The skin margins were undermined to an appropriate distance in all directions around the primary defect and laterally outward around the island pedicle utilizing iris scissors.  There was minimal undermining beneath the pedicle flap. A suspension suture was placed in the canthal tendon to prevent tension and prevent ectropion.
Alar Island Pedicle Flap Text: The defect edges were debeveled with a #15 scalpel blade.  Given the location of the defect, shape of the defect and the proximity to the alar rim an island pedicle advancement flap was deemed most appropriate.  Using a sterile surgical marker, an appropriate advancement flap was drawn incorporating the defect, outlining the appropriate donor tissue and placing the expected incisions within the nasal ala running parallel to the alar rim. The area thus outlined was incised with a #15 scalpel blade.  The skin margins were undermined minimally to an appropriate distance in all directions around the primary defect and laterally outward around the island pedicle utilizing iris scissors.  There was minimal undermining beneath the pedicle flap.
Double Island Pedicle Flap Text: The defect edges were debeveled with a #15 scalpel blade.  Given the location of the defect, shape of the defect and the proximity to free margins a double island pedicle advancement flap was deemed most appropriate.  Using a sterile surgical marker, an appropriate advancement flap was drawn incorporating the defect, outlining the appropriate donor tissue and placing the expected incisions within the relaxed skin tension lines where possible.    The area thus outlined was incised deep to adipose tissue with a #15 scalpel blade.  The skin margins were undermined to an appropriate distance in all directions around the primary defect and laterally outward around the island pedicle utilizing iris scissors.  There was minimal undermining beneath the pedicle flap.
Island Pedicle Flap-Requiring Vessel Identification Text: The defect edges were debeveled with a #15 scalpel blade.  Given the location of the defect, shape of the defect and the proximity to free margins an island pedicle advancement flap was deemed most appropriate.  Using a sterile surgical marker, an appropriate advancement flap was drawn, based on the axial vessel mentioned above, incorporating the defect, outlining the appropriate donor tissue and placing the expected incisions within the relaxed skin tension lines where possible.    The area thus outlined was incised deep to adipose tissue with a #15 scalpel blade.  The skin margins were undermined to an appropriate distance in all directions around the primary defect and laterally outward around the island pedicle utilizing iris scissors.  There was minimal undermining beneath the pedicle flap.
Keystone Flap Text: The defect edges were debeveled with a #15 scalpel blade.  Given the location of the defect, shape of the defect a keystone flap was deemed most appropriate.  Using a sterile surgical marker, an appropriate keystone flap was drawn incorporating the defect, outlining the appropriate donor tissue and placing the expected incisions within the relaxed skin tension lines where possible. The area thus outlined was incised deep to adipose tissue with a #15 scalpel blade.  The skin margins were undermined to an appropriate distance in all directions around the primary defect and laterally outward around the flap utilizing iris scissors.
O-T Plasty Text: The defect edges were debeveled with a #15 scalpel blade.  Given the location of the defect, shape of the defect and the proximity to free margins an O-T plasty was deemed most appropriate.  Using a sterile surgical marker, an appropriate O-T plasty was drawn incorporating the defect and placing the expected incisions within the relaxed skin tension lines where possible.    The area thus outlined was incised deep to adipose tissue with a #15 scalpel blade.  The skin margins were undermined to an appropriate distance in all directions utilizing iris scissors.
O-Z Plasty Text: The defect edges were debeveled with a #15 scalpel blade.  Given the location of the defect, shape of the defect and the proximity to free margins an O-Z plasty (double transposition flap) was deemed most appropriate.  Using a sterile surgical marker, the appropriate transposition flaps were drawn incorporating the defect and placing the expected incisions within the relaxed skin tension lines where possible.    The area thus outlined was incised deep to adipose tissue with a #15 scalpel blade.  The skin margins were undermined to an appropriate distance in all directions utilizing iris scissors.  Hemostasis was achieved with electrocautery.  The flaps were then transposed into place, one clockwise and the other counterclockwise, and anchored with interrupted buried subcutaneous sutures.
Double O-Z Plasty Text: The defect edges were debeveled with a #15 scalpel blade.  Given the location of the defect, shape of the defect and the proximity to free margins a Double O-Z plasty (double transposition flap) was deemed most appropriate.  Using a sterile surgical marker, the appropriate transposition flaps were drawn incorporating the defect and placing the expected incisions within the relaxed skin tension lines where possible. The area thus outlined was incised deep to adipose tissue with a #15 scalpel blade.  The skin margins were undermined to an appropriate distance in all directions utilizing iris scissors.  Hemostasis was achieved with electrocautery.  The flaps were then transposed into place, one clockwise and the other counterclockwise, and anchored with interrupted buried subcutaneous sutures.
V-Y Plasty Text: The defect edges were debeveled with a #15 scalpel blade.  Given the location of the defect, shape of the defect and the proximity to free margins an V-Y advancement flap was deemed most appropriate.  Using a sterile surgical marker, an appropriate advancement flap was drawn incorporating the defect and placing the expected incisions within the relaxed skin tension lines where possible.    The area thus outlined was incised deep to adipose tissue with a #15 scalpel blade.  The skin margins were undermined to an appropriate distance in all directions utilizing iris scissors.
H Plasty Text: Given the location of the defect, shape of the defect and the proximity to free margins a H-plasty was deemed most appropriate for repair.  Using a sterile surgical marker, the appropriate advancement arms of the H-plasty were drawn incorporating the defect and placing the expected incisions within the relaxed skin tension lines where possible. The area thus outlined was incised deep to adipose tissue with a #15 scalpel blade. The skin margins were undermined to an appropriate distance in all directions utilizing iris scissors.  The opposing advancement arms were then advanced into place in opposite direction and anchored with interrupted buried subcutaneous sutures.
W Plasty Text: The lesion was extirpated to the level of the fat with a #15 scalpel blade.  Given the location of the defect, shape of the defect and the proximity to free margins a W-plasty was deemed most appropriate for repair.  Using a sterile surgical marker, the appropriate transposition arms of the W-plasty were drawn incorporating the defect and placing the expected incisions within the relaxed skin tension lines where possible.    The area thus outlined was incised deep to adipose tissue with a #15 scalpel blade.  The skin margins were undermined to an appropriate distance in all directions utilizing iris scissors.  The opposing transposition arms were then transposed into place in opposite direction and anchored with interrupted buried subcutaneous sutures.
Z Plasty Text: The lesion was extirpated to the level of the fat with a #15 scalpel blade.  Given the location of the defect, shape of the defect and the proximity to free margins a Z-plasty was deemed most appropriate for repair.  Using a sterile surgical marker, the appropriate transposition arms of the Z-plasty were drawn incorporating the defect and placing the expected incisions within the relaxed skin tension lines where possible.    The area thus outlined was incised deep to adipose tissue with a #15 scalpel blade.  The skin margins were undermined to an appropriate distance in all directions utilizing iris scissors.  The opposing transposition arms were then transposed into place in opposite direction and anchored with interrupted buried subcutaneous sutures.
Double Z Plasty Text: The lesion was extirpated to the level of the fat with a #15 scalpel blade. Given the location of the defect, shape of the defect and the proximity to free margins a double Z-plasty was deemed most appropriate for repair. Using a sterile surgical marker, the appropriate transposition arms of the double Z-plasty were drawn incorporating the defect and placing the expected incisions within the relaxed skin tension lines where possible. The area thus outlined was incised deep to adipose tissue with a #15 scalpel blade. The skin margins were undermined to an appropriate distance in all directions utilizing iris scissors. The opposing transposition arms were then transposed and carried over into place in opposite direction and anchored with interrupted buried subcutaneous sutures.
Zygomaticofacial Flap Text: Given the location of the defect, shape of the defect and the proximity to free margins a zygomaticofacial flap was deemed most appropriate for repair.  Using a sterile surgical marker, the appropriate flap was drawn incorporating the defect and placing the expected incisions within the relaxed skin tension lines where possible. The area thus outlined was incised deep to adipose tissue with a #15 scalpel blade with preservation of a vascular pedicle.  The skin margins were undermined to an appropriate distance in all directions utilizing iris scissors.  The flap was then placed into the defect and anchored with interrupted buried subcutaneous sutures.
Cheek Interpolation Flap Text: A decision was made to reconstruct the defect utilizing an interpolation axial flap and a staged reconstruction.  A telfa template was made of the defect.  This telfa template was then used to outline the Cheek Interpolation flap.  The donor area for the pedicle flap was then injected with anesthesia.  The flap was excised through the skin and subcutaneous tissue down to the layer of the underlying musculature.  The interpolation flap was carefully excised within this deep plane to maintain its blood supply.  The edges of the donor site were undermined.   The donor site was closed in a primary fashion.  The pedicle was then rotated into position and sutured.  Once the tube was sutured into place, adequate blood supply was confirmed with blanching and refill.  The pedicle was then wrapped with xeroform gauze and dressed appropriately with a telfa and gauze bandage to ensure continued blood supply and protect the attached pedicle.
Cheek-To-Nose Interpolation Flap Text: A decision was made to reconstruct the defect utilizing an interpolation axial flap and a staged reconstruction.  A telfa template was made of the defect.  This telfa template was then used to outline the Cheek-To-Nose Interpolation flap.  The donor area for the pedicle flap was then injected with anesthesia.  The flap was excised through the skin and subcutaneous tissue down to the layer of the underlying musculature.  The interpolation flap was carefully excised within this deep plane to maintain its blood supply.  The edges of the donor site were undermined.   The donor site was closed in a primary fashion.  The pedicle was then rotated into position and sutured.  Once the tube was sutured into place, adequate blood supply was confirmed with blanching and refill.  The pedicle was then wrapped with xeroform gauze and dressed appropriately with a telfa and gauze bandage to ensure continued blood supply and protect the attached pedicle.
Interpolation Flap Text: A decision was made to reconstruct the defect utilizing an interpolation axial flap and a staged reconstruction.  A telfa template was made of the defect.  This telfa template was then used to outline the interpolation flap.  The donor area for the pedicle flap was then injected with anesthesia.  The flap was excised through the skin and subcutaneous tissue down to the layer of the underlying musculature.  The interpolation flap was carefully excised within this deep plane to maintain its blood supply.  The edges of the donor site were undermined.   The donor site was closed in a primary fashion.  The pedicle was then rotated into position and sutured.  Once the tube was sutured into place, adequate blood supply was confirmed with blanching and refill.  The pedicle was then wrapped with xeroform gauze and dressed appropriately with a telfa and gauze bandage to ensure continued blood supply and protect the attached pedicle.
Melolabial Interpolation Flap Text: A decision was made to reconstruct the defect utilizing an interpolation axial flap and a staged reconstruction.  A telfa template was made of the defect.  This telfa template was then used to outline the melolabial interpolation flap.  The donor area for the pedicle flap was then injected with anesthesia.  The flap was excised through the skin and subcutaneous tissue down to the layer of the underlying musculature.  The pedicle flap was carefully excised within this deep plane to maintain its blood supply.  The edges of the donor site were undermined.   The donor site was closed in a primary fashion.  The pedicle was then rotated into position and sutured.  Once the tube was sutured into place, adequate blood supply was confirmed with blanching and refill.  The pedicle was then wrapped with xeroform gauze and dressed appropriately with a telfa and gauze bandage to ensure continued blood supply and protect the attached pedicle.
Mastoid Interpolation Flap Text: A decision was made to reconstruct the defect utilizing an interpolation axial flap and a staged reconstruction.  A telfa template was made of the defect.  This telfa template was then used to outline the mastoid interpolation flap.  The donor area for the pedicle flap was then injected with anesthesia.  The flap was excised through the skin and subcutaneous tissue down to the layer of the underlying musculature.  The pedicle flap was carefully excised within this deep plane to maintain its blood supply.  The edges of the donor site were undermined.   The donor site was closed in a primary fashion.  The pedicle was then rotated into position and sutured.  Once the tube was sutured into place, adequate blood supply was confirmed with blanching and refill.  The pedicle was then wrapped with xeroform gauze and dressed appropriately with a telfa and gauze bandage to ensure continued blood supply and protect the attached pedicle.
Posterior Auricular Interpolation Flap Text: A decision was made to reconstruct the defect utilizing an interpolation axial flap and a staged reconstruction.  A telfa template was made of the defect.  This telfa template was then used to outline the posterior auricular interpolation flap.  The donor area for the pedicle flap was then injected with anesthesia.  The flap was excised through the skin and subcutaneous tissue down to the layer of the underlying musculature.  The pedicle flap was carefully excised within this deep plane to maintain its blood supply.  The edges of the donor site were undermined.   The donor site was closed in a primary fashion.  The pedicle was then rotated into position and sutured.  Once the tube was sutured into place, adequate blood supply was confirmed with blanching and refill.  The pedicle was then wrapped with xeroform gauze and dressed appropriately with a telfa and gauze bandage to ensure continued blood supply and protect the attached pedicle.
[facility] Forehead Flap Text: A decision was made to reconstruct the defect utilizing an interpolation axial flap and a staged reconstruction.  A telfa template was made of the defect.  This telfa template was then used to outline the paramedian forehead pedicle flap.  The donor area for the pedicle flap was then injected with anesthesia.  The flap was excised through the skin and subcutaneous tissue down to the layer of the underlying musculature.  The pedicle flap was carefully excised within this deep plane to maintain its blood supply.  The edges of the donor site were undermined.   The donor site was closed in a primary fashion.  The pedicle was then rotated into position and sutured.  Once the tube was sutured into place, adequate blood supply was confirmed with blanching and refill.  The pedicle was then wrapped with xeroform gauze and dressed appropriately with a telfa and gauze bandage to ensure continued blood supply and protect the attached pedicle.
Abbe Flap (Upper To Lower Lip) Text: The defect of the lower lip was assessed and measured.  Given the location and size of the defect, an Abbe flap was deemed most appropriate. Using a sterile surgical marker, an appropriate Abbe flap was measured and drawn on the upper lip. Local anesthesia was then infiltrated.  A scalpel was then used to incise the upper lip through and through the skin, vermilion, muscle and mucosa, leaving the flap pedicled on the opposite side.  The flap was then rotated and transferred to the lower lip defect.  The flap was then sutured into place with a three layer technique, closing the orbicularis oris muscle layer with subcutaneous buried sutures, followed by a mucosal layer and an epidermal layer.
Abbe Flap (Lower To Upper Lip) Text: The defect of the upper lip was assessed and measured.  Given the location and size of the defect, an Abbe flap was deemed most appropriate. Using a sterile surgical marker, an appropriate Abbe flap was measured and drawn on the lower lip. Local anesthesia was then infiltrated. A scalpel was then used to incise the upper lip through and through the skin, vermilion, muscle and mucosa, leaving the flap pedicled on the opposite side.  The flap was then rotated and transferred to the lower lip defect.  The flap was then sutured into place with a three layer technique, closing the orbicularis oris muscle layer with subcutaneous buried sutures, followed by a mucosal layer and an epidermal layer.
Estlander Flap (Upper To Lower Lip) Text: The defect of the lower lip was assessed and measured.  Given the location and size of the defect, an Estlander flap was deemed most appropriate. Using a sterile surgical marker, an appropriate Estlander flap was measured and drawn on the upper lip. Local anesthesia was then infiltrated. A scalpel was then used to incise the lateral aspect of the flap, through skin, muscle and mucosa, leaving the flap pedicled medially.  The flap was then rotated and positioned to fill the lower lip defect.  The flap was then sutured into place with a three layer technique, closing the orbicularis oris muscle layer with subcutaneous buried sutures, followed by a mucosal layer and an epidermal layer.
Lip Wedge Excision Repair Text: Given the location of the defect and the proximity to free margins a full thickness wedge repair was deemed most appropriate.  Using a sterile surgical marker, the appropriate repair was drawn incorporating the defect and placing the expected incisions perpendicular to the vermilion border.  The vermilion border was also meticulously outlined to ensure appropriate reapproximation during the repair.  The area thus outlined was incised through and through with a #15 scalpel blade.  The muscularis and dermis were reaproximated with deep sutures following hemostasis. Care was taken to realign the vermilion border before proceeding with the superficial closure.  Once the vermilion was realigned the superfical and mucosal closure was finished.
Ftsg Text: The defect edges were debeveled with a #15 scalpel blade.  Given the location of the defect, shape of the defect and the proximity to free margins a full thickness skin graft was deemed most appropriate.  Using a sterile surgical marker, the primary defect shape was transferred to the donor site. The area thus outlined was incised deep to adipose tissue with a #15 scalpel blade.  The harvested graft was then trimmed of adipose tissue until only dermis and epidermis was left.  The skin margins of the secondary defect were undermined to an appropriate distance in all directions utilizing iris scissors.  The secondary defect was closed with interrupted buried subcutaneous sutures.  The skin edges were then re-apposed with running  sutures.  The skin graft was then placed in the primary defect and oriented appropriately.
Split-Thickness Skin Graft Text: The defect edges were debeveled with a #15 scalpel blade.  Given the location of the defect, shape of the defect and the proximity to free margins a split thickness skin graft was deemed most appropriate.  Using a sterile surgical marker, the primary defect shape was transferred to the donor site. The split thickness graft was then harvested.  The skin graft was then placed in the primary defect and oriented appropriately.
Pinch Graft Text: The defect edges were debeveled with a #15 scalpel blade. Given the location of the defect, shape of the defect and the proximity to free margins a pinch graft was deemed most appropriate. Using a sterile surgical marker, the primary defect shape was transferred to the donor site. The area thus outlined was incised deep to adipose tissue with a #15 scalpel blade.  The harvested graft was then trimmed of adipose tissue until only dermis and epidermis was left. The skin margins of the secondary defect were undermined to an appropriate distance in all directions utilizing iris scissors.  The secondary defect was closed with interrupted buried subcutaneous sutures.  The skin edges were then re-apposed with running  sutures.  The skin graft was then placed in the primary defect and oriented appropriately.
Burow's Graft Text: The defect edges were debeveled with a #15 scalpel blade.  Given the location of the defect, shape of the defect, the proximity to free margins and the presence of a standing cone deformity a Burow's skin graft was deemed most appropriate. The standing cone was removed and this tissue was then trimmed to the shape of the primary defect. The adipose tissue was also removed until only dermis and epidermis were left.  The skin margins of the secondary defect were undermined to an appropriate distance in all directions utilizing iris scissors.  The secondary defect was closed with interrupted buried subcutaneous sutures.  The skin edges were then re-apposed with running  sutures.  The skin graft was then placed in the primary defect and oriented appropriately.
Cartilage Graft Text: The defect edges were debeveled with a #15 scalpel blade.  Given the location of the defect, shape of the defect, the fact the defect involved a full thickness cartilage defect a cartilage graft was deemed most appropriate.  An appropriate donor site was identified, cleansed, and anesthetized. The cartilage graft was then harvested and transferred to the recipient site, oriented appropriately and then sutured into place.  The secondary defect was then repaired using a primary closure.
Composite Graft Text: The defect edges were debeveled with a #15 scalpel blade.  Given the location of the defect, shape of the defect, the proximity to free margins and the fact the defect was full thickness a composite graft was deemed most appropriate.  The defect was outline and then transferred to the donor site.  A full thickness graft was then excised from the donor site. The graft was then placed in the primary defect, oriented appropriately and then sutured into place.  The secondary defect was then repaired using a primary closure.
Epidermal Autograft Text: The defect edges were debeveled with a #15 scalpel blade.  Given the location of the defect, shape of the defect and the proximity to free margins an epidermal autograft was deemed most appropriate.  Using a sterile surgical marker, the primary defect shape was transferred to the donor site. The epidermal graft was then harvested.  The skin graft was then placed in the primary defect and oriented appropriately.
Dermal Autograft Text: The defect edges were debeveled with a #15 scalpel blade.  Given the location of the defect, shape of the defect and the proximity to free margins a dermal autograft was deemed most appropriate.  Using a sterile surgical marker, the primary defect shape was transferred to the donor site. The area thus outlined was incised deep to adipose tissue with a #15 scalpel blade.  The harvested graft was then trimmed of adipose and epidermal tissue until only dermis was left.  The skin graft was then placed in the primary defect and oriented appropriately.
Skin Substitute Text: The defect edges were debeveled with a #15 scalpel blade.  Given the location of the defect, shape of the defect and the proximity to free margins a skin substitute graft was deemed most appropriate.  The graft material was trimmed to fit the size of the defect. The graft was then placed in the primary defect and oriented appropriately.
Tissue Cultured Epidermal Autograft Text: The defect edges were debeveled with a #15 scalpel blade.  Given the location of the defect, shape of the defect and the proximity to free margins a tissue cultured epidermal autograft was deemed most appropriate.  The graft was then trimmed to fit the size of the defect.  The graft was then placed in the primary defect and oriented appropriately.
Xenograft Text: The defect edges were debeveled with a #15 scalpel blade.  Given the location of the defect, shape of the defect and the proximity to free margins a xenograft was deemed most appropriate.  The graft was then trimmed to fit the size of the defect.  The graft was then placed in the primary defect and oriented appropriately.
Purse String (Intermediate) Text: Given the location of the defect and the characteristics of the surrounding skin a purse string intermediate closure was deemed most appropriate.  Undermining was performed circumferentially around the surgical defect.  A purse string suture was then placed and tightened.
Purse String (Simple) Text: Given the location of the defect and the characteristics of the surrounding skin a purse string simple closure was deemed most appropriate.  Undermining was performed circumferentially around the surgical defect.  A purse string suture was then placed and tightened.
Complex Repair And Single Advancement Flap Text: The defect edges were debeveled with a #15 scalpel blade.  The primary defect was closed partially with a complex linear closure.  Given the location of the remaining defect, shape of the defect and the proximity to free margins a single advancement flap was deemed most appropriate for complete closure of the defect.  Using a sterile surgical marker, an appropriate advancement flap was drawn incorporating the defect and placing the expected incisions within the relaxed skin tension lines where possible.    The area thus outlined was incised deep to adipose tissue with a #15 scalpel blade.  The skin margins were undermined to an appropriate distance in all directions utilizing iris scissors.
Complex Repair And Double Advancement Flap Text: The defect edges were debeveled with a #15 scalpel blade.  The primary defect was closed partially with a complex linear closure.  Given the location of the remaining defect, shape of the defect and the proximity to free margins a double advancement flap was deemed most appropriate for complete closure of the defect.  Using a sterile surgical marker, an appropriate advancement flap was drawn incorporating the defect and placing the expected incisions within the relaxed skin tension lines where possible.    The area thus outlined was incised deep to adipose tissue with a #15 scalpel blade.  The skin margins were undermined to an appropriate distance in all directions utilizing iris scissors.
Complex Repair And Modified Advancement Flap Text: The defect edges were debeveled with a #15 scalpel blade.  The primary defect was closed partially with a complex linear closure.  Given the location of the remaining defect, shape of the defect and the proximity to free margins a modified advancement flap was deemed most appropriate for complete closure of the defect.  Using a sterile surgical marker, an appropriate advancement flap was drawn incorporating the defect and placing the expected incisions within the relaxed skin tension lines where possible.    The area thus outlined was incised deep to adipose tissue with a #15 scalpel blade.  The skin margins were undermined to an appropriate distance in all directions utilizing iris scissors.
Complex Repair And A-T Advancement Flap Text: The defect edges were debeveled with a #15 scalpel blade.  The primary defect was closed partially with a complex linear closure.  Given the location of the remaining defect, shape of the defect and the proximity to free margins an A-T advancement flap was deemed most appropriate for complete closure of the defect.  Using a sterile surgical marker, an appropriate advancement flap was drawn incorporating the defect and placing the expected incisions within the relaxed skin tension lines where possible.    The area thus outlined was incised deep to adipose tissue with a #15 scalpel blade.  The skin margins were undermined to an appropriate distance in all directions utilizing iris scissors.
Complex Repair And O-T Advancement Flap Text: The defect edges were debeveled with a #15 scalpel blade.  The primary defect was closed partially with a complex linear closure.  Given the location of the remaining defect, shape of the defect and the proximity to free margins an O-T advancement flap was deemed most appropriate for complete closure of the defect.  Using a sterile surgical marker, an appropriate advancement flap was drawn incorporating the defect and placing the expected incisions within the relaxed skin tension lines where possible.    The area thus outlined was incised deep to adipose tissue with a #15 scalpel blade.  The skin margins were undermined to an appropriate distance in all directions utilizing iris scissors.
Complex Repair And O-L Flap Text: The defect edges were debeveled with a #15 scalpel blade.  The primary defect was closed partially with a complex linear closure.  Given the location of the remaining defect, shape of the defect and the proximity to free margins an O-L flap was deemed most appropriate for complete closure of the defect.  Using a sterile surgical marker, an appropriate flap was drawn incorporating the defect and placing the expected incisions within the relaxed skin tension lines where possible.    The area thus outlined was incised deep to adipose tissue with a #15 scalpel blade.  The skin margins were undermined to an appropriate distance in all directions utilizing iris scissors.
Complex Repair And Bilobe Flap Text: The defect edges were debeveled with a #15 scalpel blade.  The primary defect was closed partially with a complex linear closure.  Given the location of the remaining defect, shape of the defect and the proximity to free margins a bilobe flap was deemed most appropriate for complete closure of the defect.  Using a sterile surgical marker, an appropriate advancement flap was drawn incorporating the defect and placing the expected incisions within the relaxed skin tension lines where possible.    The area thus outlined was incised deep to adipose tissue with a #15 scalpel blade.  The skin margins were undermined to an appropriate distance in all directions utilizing iris scissors.
Complex Repair And Melolabial Flap Text: The defect edges were debeveled with a #15 scalpel blade.  The primary defect was closed partially with a complex linear closure.  Given the location of the remaining defect, shape of the defect and the proximity to free margins a melolabial flap was deemed most appropriate for complete closure of the defect.  Using a sterile surgical marker, an appropriate advancement flap was drawn incorporating the defect and placing the expected incisions within the relaxed skin tension lines where possible.    The area thus outlined was incised deep to adipose tissue with a #15 scalpel blade.  The skin margins were undermined to an appropriate distance in all directions utilizing iris scissors.
Complex Repair And Rotation Flap Text: The defect edges were debeveled with a #15 scalpel blade.  The primary defect was closed partially with a complex linear closure.  Given the location of the remaining defect, shape of the defect and the proximity to free margins a rotation flap was deemed most appropriate for complete closure of the defect.  Using a sterile surgical marker, an appropriate advancement flap was drawn incorporating the defect and placing the expected incisions within the relaxed skin tension lines where possible.    The area thus outlined was incised deep to adipose tissue with a #15 scalpel blade.  The skin margins were undermined to an appropriate distance in all directions utilizing iris scissors.
Complex Repair And Rhombic Flap Text: The defect edges were debeveled with a #15 scalpel blade.  The primary defect was closed partially with a complex linear closure.  Given the location of the remaining defect, shape of the defect and the proximity to free margins a rhombic flap was deemed most appropriate for complete closure of the defect.  Using a sterile surgical marker, an appropriate advancement flap was drawn incorporating the defect and placing the expected incisions within the relaxed skin tension lines where possible.    The area thus outlined was incised deep to adipose tissue with a #15 scalpel blade.  The skin margins were undermined to an appropriate distance in all directions utilizing iris scissors.
Complex Repair And Transposition Flap Text: The defect edges were debeveled with a #15 scalpel blade.  The primary defect was closed partially with a complex linear closure.  Given the location of the remaining defect, shape of the defect and the proximity to free margins a transposition flap was deemed most appropriate for complete closure of the defect.  Using a sterile surgical marker, an appropriate advancement flap was drawn incorporating the defect and placing the expected incisions within the relaxed skin tension lines where possible.    The area thus outlined was incised deep to adipose tissue with a #15 scalpel blade.  The skin margins were undermined to an appropriate distance in all directions utilizing iris scissors.
Complex Repair And V-Y Plasty Text: The defect edges were debeveled with a #15 scalpel blade.  The primary defect was closed partially with a complex linear closure.  Given the location of the remaining defect, shape of the defect and the proximity to free margins a V-Y plasty was deemed most appropriate for complete closure of the defect.  Using a sterile surgical marker, an appropriate advancement flap was drawn incorporating the defect and placing the expected incisions within the relaxed skin tension lines where possible.    The area thus outlined was incised deep to adipose tissue with a #15 scalpel blade.  The skin margins were undermined to an appropriate distance in all directions utilizing iris scissors.
Complex Repair And M Plasty Text: The defect edges were debeveled with a #15 scalpel blade.  The primary defect was closed partially with a complex linear closure.  Given the location of the remaining defect, shape of the defect and the proximity to free margins an M plasty was deemed most appropriate for complete closure of the defect.  Using a sterile surgical marker, an appropriate advancement flap was drawn incorporating the defect and placing the expected incisions within the relaxed skin tension lines where possible.    The area thus outlined was incised deep to adipose tissue with a #15 scalpel blade.  The skin margins were undermined to an appropriate distance in all directions utilizing iris scissors.
Complex Repair And Double M Plasty Text: The defect edges were debeveled with a #15 scalpel blade.  The primary defect was closed partially with a complex linear closure.  Given the location of the remaining defect, shape of the defect and the proximity to free margins a double M plasty was deemed most appropriate for complete closure of the defect.  Using a sterile surgical marker, an appropriate advancement flap was drawn incorporating the defect and placing the expected incisions within the relaxed skin tension lines where possible.    The area thus outlined was incised deep to adipose tissue with a #15 scalpel blade.  The skin margins were undermined to an appropriate distance in all directions utilizing iris scissors.
Complex Repair And W Plasty Text: The defect edges were debeveled with a #15 scalpel blade.  The primary defect was closed partially with a complex linear closure.  Given the location of the remaining defect, shape of the defect and the proximity to free margins a W plasty was deemed most appropriate for complete closure of the defect.  Using a sterile surgical marker, an appropriate advancement flap was drawn incorporating the defect and placing the expected incisions within the relaxed skin tension lines where possible.    The area thus outlined was incised deep to adipose tissue with a #15 scalpel blade.  The skin margins were undermined to an appropriate distance in all directions utilizing iris scissors.
Complex Repair And Z Plasty Text: The defect edges were debeveled with a #15 scalpel blade.  The primary defect was closed partially with a complex linear closure.  Given the location of the remaining defect, shape of the defect and the proximity to free margins a Z plasty was deemed most appropriate for complete closure of the defect.  Using a sterile surgical marker, an appropriate advancement flap was drawn incorporating the defect and placing the expected incisions within the relaxed skin tension lines where possible.    The area thus outlined was incised deep to adipose tissue with a #15 scalpel blade.  The skin margins were undermined to an appropriate distance in all directions utilizing iris scissors.
Complex Repair And Dorsal Nasal Flap Text: The defect edges were debeveled with a #15 scalpel blade.  The primary defect was closed partially with a complex linear closure.  Given the location of the remaining defect, shape of the defect and the proximity to free margins a dorsal nasal flap was deemed most appropriate for complete closure of the defect.  Using a sterile surgical marker, an appropriate flap was drawn incorporating the defect and placing the expected incisions within the relaxed skin tension lines where possible.    The area thus outlined was incised deep to adipose tissue with a #15 scalpel blade.  The skin margins were undermined to an appropriate distance in all directions utilizing iris scissors.
Complex Repair And Ftsg Text: The defect edges were debeveled with a #15 scalpel blade.  The primary defect was closed partially with a complex linear closure.  Given the location of the defect, shape of the defect and the proximity to free margins a full thickness skin graft was deemed most appropriate to repair the remaining defect.  The graft was trimmed to fit the size of the remaining defect.  The graft was then placed in the primary defect, oriented appropriately, and sutured into place.
Complex Repair And Burow's Graft Text: The defect edges were debeveled with a #15 scalpel blade.  The primary defect was closed partially with a complex linear closure.  Given the location of the defect, shape of the defect, the proximity to free margins and the presence of a standing cone deformity a Burow's graft was deemed most appropriate to repair the remaining defect.  The graft was trimmed to fit the size of the remaining defect.  The graft was then placed in the primary defect, oriented appropriately, and sutured into place.
Complex Repair And Split-Thickness Skin Graft Text: The defect edges were debeveled with a #15 scalpel blade.  The primary defect was closed partially with a complex linear closure.  Given the location of the defect, shape of the defect and the proximity to free margins a split thickness skin graft was deemed most appropriate to repair the remaining defect.  The graft was trimmed to fit the size of the remaining defect.  The graft was then placed in the primary defect, oriented appropriately, and sutured into place.
Complex Repair And Epidermal Autograft Text: The defect edges were debeveled with a #15 scalpel blade.  The primary defect was closed partially with a complex linear closure.  Given the location of the defect, shape of the defect and the proximity to free margins an epidermal autograft was deemed most appropriate to repair the remaining defect.  The graft was trimmed to fit the size of the remaining defect.  The graft was then placed in the primary defect, oriented appropriately, and sutured into place.
Complex Repair And Dermal Autograft Text: The defect edges were debeveled with a #15 scalpel blade.  The primary defect was closed partially with a complex linear closure.  Given the location of the defect, shape of the defect and the proximity to free margins an dermal autograft was deemed most appropriate to repair the remaining defect.  The graft was trimmed to fit the size of the remaining defect.  The graft was then placed in the primary defect, oriented appropriately, and sutured into place.
Complex Repair And Tissue Cultured Epidermal Autograft Text: The defect edges were debeveled with a #15 scalpel blade.  The primary defect was closed partially with a complex linear closure.  Given the location of the defect, shape of the defect and the proximity to free margins an tissue cultured epidermal autograft was deemed most appropriate to repair the remaining defect.  The graft was trimmed to fit the size of the remaining defect.  The graft was then placed in the primary defect, oriented appropriately, and sutured into place.
Complex Repair And Xenograft Text: The defect edges were debeveled with a #15 scalpel blade.  The primary defect was closed partially with a complex linear closure.  Given the location of the defect, shape of the defect and the proximity to free margins a xenograft was deemed most appropriate to repair the remaining defect.  The graft was trimmed to fit the size of the remaining defect.  The graft was then placed in the primary defect, oriented appropriately, and sutured into place.
Complex Repair And Skin Substitute Graft Text: The defect edges were debeveled with a #15 scalpel blade.  The primary defect was closed partially with a complex linear closure.  Given the location of the remaining defect, shape of the defect and the proximity to free margins a skin substitute graft was deemed most appropriate to repair the remaining defect.  The graft was trimmed to fit the size of the remaining defect.  The graft was then placed in the primary defect, oriented appropriately, and sutured into place.
Path Notes (To The Dermatopathologist): Please check margins.
Consent was obtained from the patient. The risks and benefits to therapy were discussed in detail. Specifically, the risks of infection, scarring, bleeding, prolonged wound healing, incomplete removal, allergy to anesthesia, nerve injury and recurrence were addressed. Prior to the procedure, the treatment site was clearly identified and confirmed by the patient. All components of Universal Protocol/PAUSE Rule completed.
Post-Care Instructions: I reviewed with the patient in detail post-care instructions. Patient is not to engage in any heavy lifting, exercise, or swimming for the next 14 days. Should the patient develop any fevers, chills, bleeding, severe pain patient will contact the office immediately.
Home Suture Removal Text: Patient was provided a home suture removal kit and will remove their sutures at home.  If they have any questions or difficulties they will call the office.
Where Do You Want The Question To Include Opioid Counseling Located?: Case Summary Tab
Billing Type: Third-Party Bill
Information: Selecting Yes will display possible errors in your note based on the variables you have selected. This validation is only offered as a suggestion for you. PLEASE NOTE THAT THE VALIDATION TEXT WILL BE REMOVED WHEN YOU FINALIZE YOUR NOTE. IF YOU WANT TO FAX A PRELIMINARY NOTE YOU WILL NEED TO TOGGLE THIS TO 'NO' IF YOU DO NOT WANT IT IN YOUR FAXED NOTE.

## 2024-04-23 NOTE — HPI: SKIN LESION (DYSPLASTIC NEVUS)
Is This A New Presentation, Or A Follow-Up?: Dysplastic Nevus
How Severe Is Your Skin Lesion?: moderate
When Was The Dysplastic Nevus Biopsied? (Optional): 03/26/2024
Accession # (Optional): [id number]

## 2024-05-03 ENCOUNTER — APPOINTMENT (RX ONLY)
Dept: URBAN - METROPOLITAN AREA CLINIC 20 | Facility: CLINIC | Age: 44
Setting detail: DERMATOLOGY
End: 2024-05-03

## 2024-05-03 DIAGNOSIS — Z48.02 ENCOUNTER FOR REMOVAL OF SUTURES: ICD-10-CM

## 2024-05-03 PROCEDURE — ? COUNSELING

## 2024-05-03 PROCEDURE — ? ADDITIONAL NOTES

## 2024-05-03 PROCEDURE — ? SUTURE REMOVAL (GLOBAL PERIOD)

## 2024-05-03 ASSESSMENT — LOCATION SIMPLE DESCRIPTION DERM: LOCATION SIMPLE: RIGHT THIGH

## 2024-05-03 ASSESSMENT — LOCATION ZONE DERM: LOCATION ZONE: LEG

## 2024-05-03 ASSESSMENT — LOCATION DETAILED DESCRIPTION DERM: LOCATION DETAILED: RIGHT ANTERIOR LATERAL PROXIMAL THIGH

## 2024-05-03 NOTE — PROCEDURE: SUTURE REMOVAL (GLOBAL PERIOD)
Detail Level: Detailed
Add 49538 Cpt? (Important Note: In 2017 The Use Of 49014 Is Being Tracked By Cms To Determine Future Global Period Reimbursement For Global Periods): no

## 2024-05-08 ENCOUNTER — APPOINTMENT (RX ONLY)
Dept: URBAN - METROPOLITAN AREA CLINIC 20 | Facility: CLINIC | Age: 44
Setting detail: DERMATOLOGY
End: 2024-05-08

## 2024-05-08 DIAGNOSIS — Z48.02 ENCOUNTER FOR REMOVAL OF SUTURES: ICD-10-CM

## 2024-05-08 PROCEDURE — ? COUNSELING

## 2024-05-08 PROCEDURE — ? ADDITIONAL NOTES

## 2024-05-08 ASSESSMENT — LOCATION SIMPLE DESCRIPTION DERM: LOCATION SIMPLE: RIGHT THIGH

## 2024-05-08 ASSESSMENT — LOCATION ZONE DERM: LOCATION ZONE: LEG

## 2024-05-08 ASSESSMENT — LOCATION DETAILED DESCRIPTION DERM: LOCATION DETAILED: RIGHT ANTERIOR LATERAL PROXIMAL THIGH

## 2024-05-08 NOTE — PROCEDURE: ADDITIONAL NOTES
Additional Notes: Patient returned to office today for a retained suture. However, there was no evidence of one remaining.
Detail Level: Simple
Render Risk Assessment In Note?: no

## 2024-06-26 ENCOUNTER — HOSPITAL ENCOUNTER (OUTPATIENT)
Dept: RADIOLOGY | Facility: MEDICAL CENTER | Age: 44
End: 2024-06-26
Attending: PHYSICIAN ASSISTANT
Payer: COMMERCIAL

## 2024-06-26 DIAGNOSIS — R92.30 DENSE BREASTS: ICD-10-CM

## 2024-06-26 DIAGNOSIS — Z12.31 VISIT FOR SCREENING MAMMOGRAM: ICD-10-CM

## 2024-06-26 PROCEDURE — 76641 ULTRASOUND BREAST COMPLETE: CPT

## 2024-06-26 PROCEDURE — 77063 BREAST TOMOSYNTHESIS BI: CPT

## 2024-07-08 ENCOUNTER — APPOINTMENT (OUTPATIENT)
Dept: RADIOLOGY | Facility: IMAGING CENTER | Age: 44
End: 2024-07-08
Attending: ORTHOPAEDIC SURGERY
Payer: COMMERCIAL

## 2024-07-08 ENCOUNTER — OFFICE VISIT (OUTPATIENT)
Dept: SURGICAL ONCOLOGY | Facility: MEDICAL CENTER | Age: 44
End: 2024-07-08
Payer: COMMERCIAL

## 2024-07-08 VITALS
SYSTOLIC BLOOD PRESSURE: 98 MMHG | HEART RATE: 75 BPM | BODY MASS INDEX: 24.74 KG/M2 | DIASTOLIC BLOOD PRESSURE: 52 MMHG | TEMPERATURE: 97.7 F | OXYGEN SATURATION: 97 % | WEIGHT: 126 LBS | HEIGHT: 60 IN

## 2024-07-08 DIAGNOSIS — D16.11: ICD-10-CM

## 2024-07-08 PROCEDURE — 3078F DIAST BP <80 MM HG: CPT | Performed by: ORTHOPAEDIC SURGERY

## 2024-07-08 PROCEDURE — 73140 X-RAY EXAM OF FINGER(S): CPT | Mod: TC,RT | Performed by: ORTHOPAEDIC SURGERY

## 2024-07-08 PROCEDURE — RXMED WILLOW AMBULATORY MEDICATION CHARGE: Performed by: ORTHOPAEDIC SURGERY

## 2024-07-08 PROCEDURE — 3074F SYST BP LT 130 MM HG: CPT | Performed by: ORTHOPAEDIC SURGERY

## 2024-07-08 PROCEDURE — 99213 OFFICE O/P EST LOW 20 MIN: CPT | Performed by: ORTHOPAEDIC SURGERY

## 2024-07-08 RX ORDER — MELOXICAM 15 MG/1
15 TABLET ORAL DAILY
Qty: 30 TABLET | Refills: 2 | Status: SHIPPED | OUTPATIENT
Start: 2024-07-08

## 2024-07-08 ASSESSMENT — ENCOUNTER SYMPTOMS
WEAKNESS: 0
SENSORY CHANGE: 0

## 2024-07-08 ASSESSMENT — FIBROSIS 4 INDEX: FIB4 SCORE: 0.55

## 2024-07-15 ENCOUNTER — PHARMACY VISIT (OUTPATIENT)
Dept: PHARMACY | Facility: MEDICAL CENTER | Age: 44
End: 2024-07-15
Payer: COMMERCIAL

## 2024-08-09 PROCEDURE — RXMED WILLOW AMBULATORY MEDICATION CHARGE: Performed by: ORTHOPAEDIC SURGERY

## 2024-08-20 ENCOUNTER — PHARMACY VISIT (OUTPATIENT)
Dept: PHARMACY | Facility: MEDICAL CENTER | Age: 44
End: 2024-08-20
Payer: COMMERCIAL

## 2024-09-24 ENCOUNTER — APPOINTMENT (RX ONLY)
Dept: URBAN - METROPOLITAN AREA CLINIC 20 | Facility: CLINIC | Age: 44
Setting detail: DERMATOLOGY
End: 2024-09-24

## 2024-09-24 DIAGNOSIS — L82.1 OTHER SEBORRHEIC KERATOSIS: ICD-10-CM

## 2024-09-24 DIAGNOSIS — L81.4 OTHER MELANIN HYPERPIGMENTATION: ICD-10-CM

## 2024-09-24 DIAGNOSIS — L81.1 CHLOASMA: ICD-10-CM

## 2024-09-24 DIAGNOSIS — Z87.2 PERSONAL HISTORY OF DISEASES OF THE SKIN AND SUBCUTANEOUS TISSUE: ICD-10-CM

## 2024-09-24 DIAGNOSIS — D22 MELANOCYTIC NEVI: ICD-10-CM

## 2024-09-24 DIAGNOSIS — L57.8 OTHER SKIN CHANGES DUE TO CHRONIC EXPOSURE TO NONIONIZING RADIATION: ICD-10-CM

## 2024-09-24 DIAGNOSIS — D18.0 HEMANGIOMA: ICD-10-CM

## 2024-09-24 PROBLEM — D22.72 MELANOCYTIC NEVI OF LEFT LOWER LIMB, INCLUDING HIP: Status: ACTIVE | Noted: 2024-09-24

## 2024-09-24 PROBLEM — D22.5 MELANOCYTIC NEVI OF TRUNK: Status: ACTIVE | Noted: 2024-09-24

## 2024-09-24 PROBLEM — D22.61 MELANOCYTIC NEVI OF RIGHT UPPER LIMB, INCLUDING SHOULDER: Status: ACTIVE | Noted: 2024-09-24

## 2024-09-24 PROBLEM — D22.71 MELANOCYTIC NEVI OF RIGHT LOWER LIMB, INCLUDING HIP: Status: ACTIVE | Noted: 2024-09-24

## 2024-09-24 PROBLEM — D48.5 NEOPLASM OF UNCERTAIN BEHAVIOR OF SKIN: Status: ACTIVE | Noted: 2024-09-24

## 2024-09-24 PROBLEM — D22.62 MELANOCYTIC NEVI OF LEFT UPPER LIMB, INCLUDING SHOULDER: Status: ACTIVE | Noted: 2024-09-24

## 2024-09-24 PROBLEM — D22.39 MELANOCYTIC NEVI OF OTHER PARTS OF FACE: Status: ACTIVE | Noted: 2024-09-24

## 2024-09-24 PROBLEM — D18.01 HEMANGIOMA OF SKIN AND SUBCUTANEOUS TISSUE: Status: ACTIVE | Noted: 2024-09-24

## 2024-09-24 PROCEDURE — 11103 TANGNTL BX SKIN EA SEP/ADDL: CPT

## 2024-09-24 PROCEDURE — 99213 OFFICE O/P EST LOW 20 MIN: CPT | Mod: 25

## 2024-09-24 PROCEDURE — ? DIAGNOSIS COMMENT

## 2024-09-24 PROCEDURE — ? BIOPSY BY SHAVE METHOD

## 2024-09-24 PROCEDURE — ? COUNSELING

## 2024-09-24 PROCEDURE — ? OBSERVATION AND MEASURE

## 2024-09-24 PROCEDURE — 11102 TANGNTL BX SKIN SINGLE LES: CPT

## 2024-09-24 ASSESSMENT — LOCATION SIMPLE DESCRIPTION DERM
LOCATION SIMPLE: LEFT CHEEK
LOCATION SIMPLE: ABDOMEN
LOCATION SIMPLE: RIGHT POSTERIOR UPPER ARM
LOCATION SIMPLE: LEFT FOREHEAD
LOCATION SIMPLE: NOSE
LOCATION SIMPLE: RIGHT BACK
LOCATION SIMPLE: RIGHT UPPER ARM
LOCATION SIMPLE: LEFT UPPER BACK
LOCATION SIMPLE: RIGHT FOREHEAD
LOCATION SIMPLE: RIGHT CHEEK
LOCATION SIMPLE: RIGHT THIGH
LOCATION SIMPLE: RIGHT UPPER BACK
LOCATION SIMPLE: LEFT LOWER BACK
LOCATION SIMPLE: LEFT FOREARM
LOCATION SIMPLE: CHEST
LOCATION SIMPLE: RIGHT POSTERIOR THIGH
LOCATION SIMPLE: LEFT THIGH
LOCATION SIMPLE: LEFT BREAST
LOCATION SIMPLE: LEFT UPPER ARM
LOCATION SIMPLE: RIGHT BUTTOCK
LOCATION SIMPLE: RIGHT HAND
LOCATION SIMPLE: RIGHT FOREARM
LOCATION SIMPLE: LEFT POSTERIOR THIGH
LOCATION SIMPLE: LEFT POSTERIOR UPPER ARM

## 2024-09-24 ASSESSMENT — LOCATION ZONE DERM
LOCATION ZONE: ARM
LOCATION ZONE: FACE
LOCATION ZONE: NOSE
LOCATION ZONE: LEG
LOCATION ZONE: TRUNK
LOCATION ZONE: HAND

## 2024-09-24 ASSESSMENT — LOCATION DETAILED DESCRIPTION DERM
LOCATION DETAILED: RIGHT PROXIMAL DORSAL FOREARM
LOCATION DETAILED: LEFT MEDIAL SUPERIOR CHEST
LOCATION DETAILED: RIGHT DISTAL POSTERIOR THIGH
LOCATION DETAILED: RIGHT BUTTOCK
LOCATION DETAILED: RIGHT SUPERIOR LATERAL UPPER BACK
LOCATION DETAILED: RIGHT ANTERIOR DISTAL THIGH
LOCATION DETAILED: RIGHT CENTRAL MALAR CHEEK
LOCATION DETAILED: LEFT ANTERIOR DISTAL THIGH
LOCATION DETAILED: RIGHT MID-UPPER BACK
LOCATION DETAILED: RIGHT RADIAL DORSAL HAND
LOCATION DETAILED: LEFT SUPERIOR LATERAL MIDBACK
LOCATION DETAILED: LEFT INFERIOR CENTRAL MALAR CHEEK
LOCATION DETAILED: LEFT DISTAL POSTERIOR THIGH
LOCATION DETAILED: RIGHT ANTERIOR PROXIMAL UPPER ARM
LOCATION DETAILED: RIGHT INFERIOR UPPER BACK
LOCATION DETAILED: RIGHT CENTRAL MANDIBULAR CHEEK
LOCATION DETAILED: RIGHT SUPERIOR MEDIAL UPPER BACK
LOCATION DETAILED: LEFT DISTAL POSTERIOR UPPER ARM
LOCATION DETAILED: LEFT FOREHEAD
LOCATION DETAILED: RIGHT LATERAL BUTTOCK
LOCATION DETAILED: LEFT MEDIAL BREAST 7-8:00 REGION
LOCATION DETAILED: PERIUMBILICAL SKIN
LOCATION DETAILED: LEFT PROXIMAL DORSAL FOREARM
LOCATION DETAILED: NASAL ROOT
LOCATION DETAILED: RIGHT FOREHEAD
LOCATION DETAILED: LEFT SUPERIOR UPPER BACK
LOCATION DETAILED: RIGHT ANTERIOR PROXIMAL THIGH
LOCATION DETAILED: LEFT MEDIAL UPPER BACK
LOCATION DETAILED: LEFT ANTERIOR PROXIMAL UPPER ARM
LOCATION DETAILED: RIGHT DISTAL POSTERIOR UPPER ARM

## 2024-10-01 ENCOUNTER — IMMUNIZATION (OUTPATIENT)
Dept: OCCUPATIONAL MEDICINE | Facility: CLINIC | Age: 44
End: 2024-10-01

## 2024-10-01 DIAGNOSIS — Z23 NEED FOR VACCINATION: Primary | ICD-10-CM

## 2024-10-01 PROCEDURE — 90656 IIV3 VACC NO PRSV 0.5 ML IM: CPT | Performed by: PREVENTIVE MEDICINE

## 2024-10-14 ENCOUNTER — APPOINTMENT (RX ONLY)
Dept: URBAN - METROPOLITAN AREA CLINIC 4 | Facility: CLINIC | Age: 44
Setting detail: DERMATOLOGY
End: 2024-10-14

## 2024-10-14 DIAGNOSIS — D22 MELANOCYTIC NEVI: ICD-10-CM

## 2024-10-14 PROBLEM — D22.5 MELANOCYTIC NEVI OF TRUNK: Status: ACTIVE | Noted: 2024-10-14

## 2024-10-14 PROCEDURE — 11403 EXC TR-EXT B9+MARG 2.1-3CM: CPT

## 2024-10-14 PROCEDURE — ? EXCISION

## 2024-10-14 PROCEDURE — 13101 CMPLX RPR TRUNK 2.6-7.5 CM: CPT

## 2024-10-14 ASSESSMENT — LOCATION ZONE DERM: LOCATION ZONE: TRUNK

## 2024-10-14 ASSESSMENT — LOCATION SIMPLE DESCRIPTION DERM: LOCATION SIMPLE: CHEST

## 2024-10-14 ASSESSMENT — LOCATION DETAILED DESCRIPTION DERM: LOCATION DETAILED: LEFT MEDIAL SUPERIOR CHEST

## 2024-10-14 NOTE — PROCEDURE: EXCISION
Medical Necessity Information: It is in your best interest to select a reason for this procedure from the list below. All of these items fulfill various CMS LCD requirements except lesion extends to a margin.
Include Z78.9 (Other Specified Conditions Influencing Health Status) As An Associated Diagnosis?: No
Medical Necessity Clause: This procedure was medically necessary because the lesion that was treated was:
Lab: [id number]
Lab Facility: 
Previous Accession (Optional): [id number]
Date Of Previous Biopsy (Optional): 9/24/24
Referring Physician (Optional): JACOB Stafford
Surgeon (Optional): Charan
Biopsy Photograph Reviewed: Yes
Size Of Lesion In Cm: 1.9
Size Of Margin In Cm: 0.2
Anesthesia Volume In Cc: 8.7
Eye Clamp Note Details: An eye clamp was used during the procedure.
Excision Method: Elliptical
Saucerization Depth: dermis and superficial adipose tissue
Repair Type: Complex
Intermediate / Complex Repair - Final Wound Length In Cm: 5.2
Suturegard Retention Suture: 2-0 Nylon
Retention Suture Bite Size: 3 mm
Length To Time In Minutes Device Was In Place: 10
Number Of Hemigard Strips Per Side: 1
Distance Of Undermining In Cm (Required): 2.1
Undermining Type: Entire Wound
Debridement Text: The wound edges were debrided prior to proceeding with the closure to facilitate wound healing.
Helical Rim Text: The closure involved the helical rim.
Vermilion Border Text: The closure involved the vermilion border.
Nostril Rim Text: The closure involved the nostril rim.
Retention Suture Text: Retention sutures were placed to support the closure and prevent dehiscence.
Primary Defect Length (In Cm): 0
Epidermal Closure Graft Donor Site (Optional): simple interrupted
Graft Donor Site Bandage (Optional-Leave Blank If You Don't Want In Note): Steri-strips and a pressure bandage were applied to the donor site.
Detail Level: Detailed
Excision Depth: adipose tissue
Scalpel Size: 15 blade
Anesthesia Type: 1% lidocaine with 1:100,000 epinephrine and 408mcg clindamycin/ml and a 1:10 solution of 8.4% sodium bicarbonate
Additional Anesthesia Volume In Cc: 6
Hemostasis: Electrocautery
Estimated Blood Loss (Cc): minimal
Repair Depth: use same depth as excision depth
Repair Anesthesia Method: local infiltration
Anesthesia Volume In Cc: 14.2
Deep Sutures: 4-0 Vicryl
Dermal Closure: buried vertical mattress
Epidermal Sutures: 4-0 Vicryl Rapide
Epidermal Closure: running subcuticular
Wound Care: Bacitracin
Dressing: steri-strips
Suturegard Intro: Intraoperative tissue expansion was performed, utilizing the SUTUREGARD device, in order to reduce wound tension.
Suturegard Body: The suture ends were repeatedly re-tightened and re-clamped to achieve the desired tissue expansion.
Hemigard Intro: Due to skin fragility and wound tension, it was decided to use HEMIGARD adhesive retention suture devices to permit a linear closure. The skin was cleaned and dried for a 6cm distance away from the wound. Excessive hair, if present, was removed to allow for adhesion.
Hemigard Postcare Instructions: The HEMIGARD strips are to remain completely dry for at least 5-7 days.
Positioning (Leave Blank If You Do Not Want): The patient was placed in a comfortable position exposing the surgical site.
Complex Repair Preamble Text (Leave Blank If You Do Not Want): Extensive wide undermining was performed.
Intermediate Repair Preamble Text (Leave Blank If You Do Not Want): Undermining was performed with blunt dissection.
Fusiform Excision Additional Text (Leave Blank If You Do Not Want): The margin was drawn around the clinically apparent lesion.  A fusiform shape was then drawn on the skin incorporating the lesion and margins.  Incisions were then made along these lines to the appropriate tissue plane and the lesion was extirpated.
Eliptical Excision Additional Text (Leave Blank If You Do Not Want): The margin was drawn around the clinically apparent lesion.  An elliptical shape was then drawn on the skin incorporating the lesion and margins.  Incisions were then made along these lines to the appropriate tissue plane and the lesion was extirpated.
Saucerization Excision Additional Text (Leave Blank If You Do Not Want): The margin was drawn around the clinically apparent lesion.  Incisions were then made along these lines, in a tangential fashion, to the appropriate tissue plane and the lesion was extirpated.
Slit Excision Additional Text (Leave Blank If You Do Not Want): A linear line was drawn on the skin overlying the lesion. An incision was made slowly until the lesion was visualized.  Once visualized, the lesion was removed with blunt dissection.
Excisional Biopsy Additional Text (Leave Blank If You Do Not Want): The margin was drawn around the clinically apparent lesion. An elliptical shape was then drawn on the skin incorporating the lesion and margins.  Incisions were then made along these lines to the appropriate tissue plane and the lesion was extirpated.
Perilesional Excision Additional Text (Leave Blank If You Do Not Want): The margin was drawn around the clinically apparent lesion. Incisions were then made along these lines to the appropriate tissue plane and the lesion was extirpated.
Repair Performed By Another Provider Text (Leave Blank If You Do Not Want): After the tissue was excised the defect was repaired by another provider.
No Repair - Repaired With Adjacent Surgical Defect Text (Leave Blank If You Do Not Want): After the excision the defect was repaired concurrently with another surgical defect which was in close approximation.
Adjacent Tissue Transfer Text: The defect edges were debeveled with a #15 scalpel blade. Given the location of the defect and the proximity to free margins an adjacent tissue transfer was deemed most appropriate. Using a sterile surgical marker, an appropriate flap was drawn incorporating the defect and placing the expected incisions within the relaxed skin tension lines where possible. The area thus outlined was incised deep to adipose tissue with a #15 scalpel blade. The skin margins were undermined to an appropriate distance in all directions utilizing iris scissors and carried over to close the primary defect.
Advancement Flap (Single) Text: The defect edges were debeveled with a #15 scalpel blade.  Given the location of the defect and the proximity to free margins a single advancement flap was deemed most appropriate.  Using a sterile surgical marker, an appropriate advancement flap was drawn incorporating the defect and placing the expected incisions within the relaxed skin tension lines where possible.    The area thus outlined was incised deep to adipose tissue with a #15 scalpel blade.  The skin margins were undermined to an appropriate distance in all directions utilizing iris scissors.
Advancement Flap (Double) Text: The defect edges were debeveled with a #15 scalpel blade.  Given the location of the defect and the proximity to free margins a double advancement flap was deemed most appropriate.  Using a sterile surgical marker, the appropriate advancement flaps were drawn incorporating the defect and placing the expected incisions within the relaxed skin tension lines where possible.    The area thus outlined was incised deep to adipose tissue with a #15 scalpel blade.  The skin margins were undermined to an appropriate distance in all directions utilizing iris scissors.
Burow's Advancement Flap Text: The defect edges were debeveled with a #15 scalpel blade.  Given the location of the defect and the proximity to free margins a Burow's advancement flap was deemed most appropriate.  Using a sterile surgical marker, the appropriate advancement flap was drawn incorporating the defect and placing the expected incisions within the relaxed skin tension lines where possible.    The area thus outlined was incised deep to adipose tissue with a #15 scalpel blade.  The skin margins were undermined to an appropriate distance in all directions utilizing iris scissors.
Chonodrocutaneous Helical Advancement Flap Text: The defect edges were debeveled with a #15 scalpel blade.  Given the location of the defect and the proximity to free margins a chondrocutaneous helical advancement flap was deemed most appropriate.  Using a sterile surgical marker, the appropriate advancement flap was drawn incorporating the defect and placing the expected incisions within the relaxed skin tension lines where possible.    The area thus outlined was incised deep to adipose tissue with a #15 scalpel blade.  The skin margins were undermined to an appropriate distance in all directions utilizing iris scissors.
Crescentic Advancement Flap Text: The defect edges were debeveled with a #15 scalpel blade.  Given the location of the defect and the proximity to free margins a crescentic advancement flap was deemed most appropriate.  Using a sterile surgical marker, the appropriate advancement flap was drawn incorporating the defect and placing the expected incisions within the relaxed skin tension lines where possible.    The area thus outlined was incised deep to adipose tissue with a #15 scalpel blade.  The skin margins were undermined to an appropriate distance in all directions utilizing iris scissors.
A-T Advancement Flap Text: The defect edges were debeveled with a #15 scalpel blade.  Given the location of the defect, shape of the defect and the proximity to free margins an A-T advancement flap was deemed most appropriate.  Using a sterile surgical marker, an appropriate advancement flap was drawn incorporating the defect and placing the expected incisions within the relaxed skin tension lines where possible.    The area thus outlined was incised deep to adipose tissue with a #15 scalpel blade.  The skin margins were undermined to an appropriate distance in all directions utilizing iris scissors.
O-T Advancement Flap Text: The defect edges were debeveled with a #15 scalpel blade.  Given the location of the defect, shape of the defect and the proximity to free margins an O-T advancement flap was deemed most appropriate.  Using a sterile surgical marker, an appropriate advancement flap was drawn incorporating the defect and placing the expected incisions within the relaxed skin tension lines where possible.    The area thus outlined was incised deep to adipose tissue with a #15 scalpel blade.  The skin margins were undermined to an appropriate distance in all directions utilizing iris scissors.
O-L Flap Text: The defect edges were debeveled with a #15 scalpel blade.  Given the location of the defect, shape of the defect and the proximity to free margins an O-L flap was deemed most appropriate.  Using a sterile surgical marker, an appropriate advancement flap was drawn incorporating the defect and placing the expected incisions within the relaxed skin tension lines where possible.    The area thus outlined was incised deep to adipose tissue with a #15 scalpel blade.  The skin margins were undermined to an appropriate distance in all directions utilizing iris scissors.
O-Z Flap Text: The defect edges were debeveled with a #15 scalpel blade.  Given the location of the defect, shape of the defect and the proximity to free margins an O-Z flap was deemed most appropriate.  Using a sterile surgical marker, an appropriate transposition flap was drawn incorporating the defect and placing the expected incisions within the relaxed skin tension lines where possible. The area thus outlined was incised deep to adipose tissue with a #15 scalpel blade.  The skin margins were undermined to an appropriate distance in all directions utilizing iris scissors.
Double O-Z Flap Text: The defect edges were debeveled with a #15 scalpel blade.  Given the location of the defect, shape of the defect and the proximity to free margins a Double O-Z flap was deemed most appropriate.  Using a sterile surgical marker, an appropriate transposition flap was drawn incorporating the defect and placing the expected incisions within the relaxed skin tension lines where possible. The area thus outlined was incised deep to adipose tissue with a #15 scalpel blade.  The skin margins were undermined to an appropriate distance in all directions utilizing iris scissors.
V-Y Flap Text: The defect edges were debeveled with a #15 scalpel blade.  Given the location of the defect, shape of the defect and the proximity to free margins a V-Y flap was deemed most appropriate.  Using a sterile surgical marker, an appropriate advancement flap was drawn incorporating the defect and placing the expected incisions within the relaxed skin tension lines where possible.    The area thus outlined was incised deep to adipose tissue with a #15 scalpel blade.  The skin margins were undermined to an appropriate distance in all directions utilizing iris scissors.
Advancement-Rotation Flap Text: The defect edges were debeveled with a #15 scalpel blade. Given the location of the defect, shape of the defect and the proximity to free margins an advancement-rotation flap was deemed most appropriate. Using a sterile surgical marker, an appropriate flap was drawn incorporating the defect and placing the expected incisions within the relaxed skin tension lines where possible. The area thus outlined was incised deep to adipose tissue with a #15 scalpel blade. The skin margins were undermined to an appropriate distance in all directions utilizing iris scissors. Following this, the designed flap was carried over into the primary defect and sutured into place.
Mercedes Flap Text: The defect edges were debeveled with a #15 scalpel blade.  Given the location of the defect, shape of the defect and the proximity to free margins a Mercedes flap was deemed most appropriate.  Using a sterile surgical marker, an appropriate advancement flap was drawn incorporating the defect and placing the expected incisions within the relaxed skin tension lines where possible. The area thus outlined was incised deep to adipose tissue with a #15 scalpel blade.  The skin margins were undermined to an appropriate distance in all directions utilizing iris scissors.
Modified Advancement Flap Text: The defect edges were debeveled with a #15 scalpel blade.  Given the location of the defect, shape of the defect and the proximity to free margins a modified advancement flap was deemed most appropriate.  Using a sterile surgical marker, an appropriate advancement flap was drawn incorporating the defect and placing the expected incisions within the relaxed skin tension lines where possible.    The area thus outlined was incised deep to adipose tissue with a #15 scalpel blade.  The skin margins were undermined to an appropriate distance in all directions utilizing iris scissors.
Mucosal Advancement Flap Text: Given the location of the defect, shape of the defect and the proximity to free margins a mucosal advancement flap was deemed most appropriate. Incisions were made with a 15 blade scalpel in the appropriate fashion along the cutaneous vermillion border and the mucosal lip. The remaining actinically damaged mucosal tissue was excised.  The mucosal advancement flap was then elevated to the gingival sulcus with care taken to preserve the neurovascular structures and advanced into the primary defect. Care was taken to ensure that precise realignment of the vermilion border was achieved.
Peng Advancement Flap Text: The defect edges were debeveled with a #15 scalpel blade. Given the location of the defect, shape of the defect and the proximity to free margins a Peng advancement flap was deemed most appropriate. Using a sterile surgical marker, an appropriate advancement flap was drawn incorporating the defect and placing the expected incisions within the relaxed skin tension lines where possible. The area thus outlined was incised deep to adipose tissue with a #15 scalpel blade. The skin margins were undermined to an appropriate distance in all directions utilizing iris scissors. Following this, the designed flap was advanced and carried over into the primary defect and sutured into place.
Hatchet Flap Text: The defect edges were debeveled with a #15 scalpel blade.  Given the location of the defect, shape of the defect and the proximity to free margins a hatchet flap was deemed most appropriate.  Using a sterile surgical marker, an appropriate hatchet flap was drawn incorporating the defect and placing the expected incisions within the relaxed skin tension lines where possible.    The area thus outlined was incised deep to adipose tissue with a #15 scalpel blade.  The skin margins were undermined to an appropriate distance in all directions utilizing iris scissors.
Rotation Flap Text: The defect edges were debeveled with a #15 scalpel blade.  Given the location of the defect, shape of the defect and the proximity to free margins a rotation flap was deemed most appropriate.  Using a sterile surgical marker, an appropriate rotation flap was drawn incorporating the defect and placing the expected incisions within the relaxed skin tension lines where possible.    The area thus outlined was incised deep to adipose tissue with a #15 scalpel blade.  The skin margins were undermined to an appropriate distance in all directions utilizing iris scissors.
Bilateral Rotation Flap Text: The defect edges were debeveled with a #15 scalpel blade. Given the location of the defect, shape of the defect and the proximity to free margins a bilateral rotation flap was deemed most appropriate. Using a sterile surgical marker, an appropriate rotation flap was drawn incorporating the defect and placing the expected incisions within the relaxed skin tension lines where possible. The area thus outlined was incised deep to adipose tissue with a #15 scalpel blade. The skin margins were undermined to an appropriate distance in all directions utilizing iris scissors. Following this, the designed flap was carried over into the primary defect and sutured into place.
Spiral Flap Text: The defect edges were debeveled with a #15 scalpel blade.  Given the location of the defect, shape of the defect and the proximity to free margins a spiral flap was deemed most appropriate.  Using a sterile surgical marker, an appropriate rotation flap was drawn incorporating the defect and placing the expected incisions within the relaxed skin tension lines where possible. The area thus outlined was incised deep to adipose tissue with a #15 scalpel blade.  The skin margins were undermined to an appropriate distance in all directions utilizing iris scissors.
Staged Advancement Flap Text: The defect edges were debeveled with a #15 scalpel blade. Given the location of the defect, shape of the defect and the proximity to free margins a staged advancement flap was deemed most appropriate. Using a sterile surgical marker, an appropriate advancement flap was drawn incorporating the defect and placing the expected incisions within the relaxed skin tension lines where possible. The area thus outlined was incised deep to adipose tissue with a #15 scalpel blade. The skin margins were undermined to an appropriate distance in all directions utilizing iris scissors. Following this, the designed flap was carried over into the primary defect and sutured into place.
Star Wedge Flap Text: The defect edges were debeveled with a #15 scalpel blade.  Given the location of the defect, shape of the defect and the proximity to free margins a star wedge flap was deemed most appropriate.  Using a sterile surgical marker, an appropriate rotation flap was drawn incorporating the defect and placing the expected incisions within the relaxed skin tension lines where possible. The area thus outlined was incised deep to adipose tissue with a #15 scalpel blade.  The skin margins were undermined to an appropriate distance in all directions utilizing iris scissors.
Transposition Flap Text: The defect edges were debeveled with a #15 scalpel blade.  Given the location of the defect and the proximity to free margins a transposition flap was deemed most appropriate.  Using a sterile surgical marker, an appropriate transposition flap was drawn incorporating the defect.    The area thus outlined was incised deep to adipose tissue with a #15 scalpel blade.  The skin margins were undermined to an appropriate distance in all directions utilizing iris scissors.
Muscle Hinge Flap Text: The defect edges were debeveled with a #15 scalpel blade.  Given the size, depth and location of the defect and the proximity to free margins a muscle hinge flap was deemed most appropriate.  Using a sterile surgical marker, an appropriate hinge flap was drawn incorporating the defect. The area thus outlined was incised with a #15 scalpel blade.  The skin margins were undermined to an appropriate distance in all directions utilizing iris scissors.
Mustarde Flap Text: The defect edges were debeveled with a #15 scalpel blade.  Given the size, depth and location of the defect and the proximity to free margins a Mustarde flap was deemed most appropriate. Using a sterile surgical marker, an appropriate flap was drawn incorporating the defect. The area thus outlined was incised with a #15 scalpel blade. The skin margins were undermined to an appropriate distance in all directions utilizing iris scissors. Following this, the designed flap was carried into the primary defect and sutured into place.
Nasal Turnover Hinge Flap Text: The defect edges were debeveled with a #15 scalpel blade.  Given the size, depth, location of the defect and the defect being full thickness a nasal turnover hinge flap was deemed most appropriate.  Using a sterile surgical marker, an appropriate hinge flap was drawn incorporating the defect. The area thus outlined was incised with a #15 scalpel blade. The flap was designed to recreate the nasal mucosal lining and the alar rim. The skin margins were undermined to an appropriate distance in all directions utilizing iris scissors.
Nasalis-Muscle-Based Myocutaneous Island Pedicle Flap Text: Using a #15 blade, an incision was made around the donor flap to the level of the nasalis muscle. Wide lateral undermining was then performed in both the subcutaneous plane above the nasalis muscle, and in a submuscular plane just above periosteum. This allowed the formation of a free nasalis muscle axial pedicle (based on the angular artery) which was still attached to the actual cutaneous flap, increasing its mobility and vascular viability. Hemostasis was obtained with pinpoint electrocoagulation. The flap was mobilized into position and the pivotal anchor points positioned and stabilized with buried interrupted sutures. Subcutaneous and dermal tissues were closed in a multilayered fashion with sutures. Tissue redundancies were excised, and the epidermal edges were apposed without significant tension and sutured with sutures.
Nasalis Myocutaneous Flap Text: Using a #15 blade, an incision was made around the donor flap to the level of the nasalis muscle. Wide lateral undermining was then performed in both the subcutaneous plane above the nasalis muscle, and in a submuscular plane just above periosteum. This allowed the formation of a free nasalis muscle axial pedicle which was still attached to the actual cutaneous flap, increasing its mobility and vascular viability. Hemostasis was obtained with pinpoint electrocoagulation. The flap was mobilized into position and the pivotal anchor points positioned and stabilized with buried interrupted sutures. Subcutaneous and dermal tissues were closed in a multilayered fashion with sutures. Tissue redundancies were excised, and the epidermal edges were apposed without significant tension and sutured with sutures.
Nasolabial Transposition Flap Text: The defect edges were debeveled with a #15 scalpel blade.  Given the size, depth and location of the defect and the proximity to free margins a nasolabial transposition flap was deemed most appropriate. Using a sterile surgical marker, an appropriate flap was drawn incorporating the defect. The area thus outlined was incised with a #15 scalpel blade. The skin margins were undermined to an appropriate distance in all directions utilizing iris scissors. Following this, the designed flap was carried into the primary defect and sutured into place.
Orbicularis Oris Muscle Flap Text: The defect edges were debeveled with a #15 scalpel blade.  Given that the defect affected the competency of the oral sphincter an obicularis oris muscle flap was deemed most appropriate to restore this competency and normal muscle function.  Using a sterile surgical marker, an appropriate flap was drawn incorporating the defect. The area thus outlined was incised with a #15 scalpel blade.
Melolabial Transposition Flap Text: The defect edges were debeveled with a #15 scalpel blade.  Given the location of the defect and the proximity to free margins a melolabial flap was deemed most appropriate.  Using a sterile surgical marker, an appropriate melolabial transposition flap was drawn incorporating the defect.    The area thus outlined was incised deep to adipose tissue with a #15 scalpel blade.  The skin margins were undermined to an appropriate distance in all directions utilizing iris scissors.
Rectangular Flap Text: The defect edges were debeveled with a #15 scalpel blade. Given the location of the defect and the proximity to free margins a rectangular flap was deemed most appropriate. Using a sterile surgical marker, an appropriate rectangular flap was drawn incorporating the defect. The area thus outlined was incised deep to adipose tissue with a #15 scalpel blade. The skin margins were undermined to an appropriate distance in all directions utilizing iris scissors. Following this, the designed flap was carried over into the primary defect and sutured into place.
Rhombic Flap Text: The defect edges were debeveled with a #15 scalpel blade.  Given the location of the defect and the proximity to free margins a rhombic flap was deemed most appropriate.  Using a sterile surgical marker, an appropriate rhombic flap was drawn incorporating the defect.    The area thus outlined was incised deep to adipose tissue with a #15 scalpel blade.  The skin margins were undermined to an appropriate distance in all directions utilizing iris scissors.
Rhomboid Transposition Flap Text: The defect edges were debeveled with a #15 scalpel blade.  Given the location of the defect and the proximity to free margins a rhomboid transposition flap was deemed most appropriate.  Using a sterile surgical marker, an appropriate rhomboid flap was drawn incorporating the defect.    The area thus outlined was incised deep to adipose tissue with a #15 scalpel blade.  The skin margins were undermined to an appropriate distance in all directions utilizing iris scissors.
Bi-Rhombic Flap Text: The defect edges were debeveled with a #15 scalpel blade.  Given the location of the defect and the proximity to free margins a bi-rhombic flap was deemed most appropriate.  Using a sterile surgical marker, an appropriate rhombic flap was drawn incorporating the defect. The area thus outlined was incised deep to adipose tissue with a #15 scalpel blade.  The skin margins were undermined to an appropriate distance in all directions utilizing iris scissors.
Helical Rim Advancement Flap Text: The defect edges were debeveled with a #15 blade scalpel.  Given the location of the defect and the proximity to free margins (helical rim) a double helical rim advancement flap was deemed most appropriate.  Using a sterile surgical marker, the appropriate advancement flaps were drawn incorporating the defect and placing the expected incisions between the helical rim and antihelix where possible.  The area thus outlined was incised through and through with a #15 scalpel blade.  With a skin hook and iris scissors, the flaps were gently and sharply undermined and freed up.
Bilateral Helical Rim Advancement Flap Text: The defect edges were debeveled with a #15 blade scalpel.  Given the location of the defect and the proximity to free margins (helical rim) a bilateral helical rim advancement flap was deemed most appropriate.  Using a sterile surgical marker, the appropriate advancement flaps were drawn incorporating the defect and placing the expected incisions between the helical rim and antihelix where possible.  The area thus outlined was incised through and through with a #15 scalpel blade.  With a skin hook and iris scissors, the flaps were gently and sharply undermined and freed up.
Ear Star Wedge Flap Text: The defect edges were debeveled with a #15 blade scalpel.  Given the location of the defect and the proximity to free margins (helical rim) an ear star wedge flap was deemed most appropriate.  Using a sterile surgical marker, the appropriate flap was drawn incorporating the defect and placing the expected incisions between the helical rim and antihelix where possible.  The area thus outlined was incised through and through with a #15 scalpel blade.
Flip-Flop Flap Text: The defect edges were debeveled with a #15 blade scalpel.  Given the location of the defect and the proximity to free margins a flip-flop flap was deemed most appropriate. Using a sterile surgical marker, the appropriate flap was drawn incorporating the defect and placing the expected incisions between the helical rim and antihelix where possible.  The area thus outlined was incised through and through with a #15 scalpel blade. Following this, the designed flap was carried over into the primary defect and sutured into place.
Banner Transposition Flap Text: The defect edges were debeveled with a #15 scalpel blade.  Given the location of the defect and the proximity to free margins a Banner transposition flap was deemed most appropriate.  Using a sterile surgical marker, an appropriate flap drawn around the defect. The area thus outlined was incised deep to adipose tissue with a #15 scalpel blade.  The skin margins were undermined to an appropriate distance in all directions utilizing iris scissors.
Bilobed Flap Text: The defect edges were debeveled with a #15 scalpel blade.  Given the location of the defect and the proximity to free margins a bilobe flap was deemed most appropriate.  Using a sterile surgical marker, an appropriate bilobe flap drawn around the defect.    The area thus outlined was incised deep to adipose tissue with a #15 scalpel blade.  The skin margins were undermined to an appropriate distance in all directions utilizing iris scissors.
Bilobed Transposition Flap Text: The defect edges were debeveled with a #15 scalpel blade.  Given the location of the defect and the proximity to free margins a bilobed transposition flap was deemed most appropriate.  Using a sterile surgical marker, an appropriate bilobe flap drawn around the defect.    The area thus outlined was incised deep to adipose tissue with a #15 scalpel blade.  The skin margins were undermined to an appropriate distance in all directions utilizing iris scissors.
Trilobed Flap Text: The defect edges were debeveled with a #15 scalpel blade.  Given the location of the defect and the proximity to free margins a trilobed flap was deemed most appropriate.  Using a sterile surgical marker, an appropriate trilobed flap drawn around the defect.    The area thus outlined was incised deep to adipose tissue with a #15 scalpel blade.  The skin margins were undermined to an appropriate distance in all directions utilizing iris scissors.
Dorsal Nasal Flap Text: The defect edges were debeveled with a #15 scalpel blade.  Given the location of the defect and the proximity to free margins a dorsal nasal flap was deemed most appropriate.  Using a sterile surgical marker, an appropriate dorsal nasal flap was drawn around the defect.    The area thus outlined was incised deep to adipose tissue with a #15 scalpel blade.  The skin margins were undermined to an appropriate distance in all directions utilizing iris scissors.
Island Pedicle Flap Text: The defect edges were debeveled with a #15 scalpel blade.  Given the location of the defect, shape of the defect and the proximity to free margins an island pedicle advancement flap was deemed most appropriate.  Using a sterile surgical marker, an appropriate advancement flap was drawn incorporating the defect, outlining the appropriate donor tissue and placing the expected incisions within the relaxed skin tension lines where possible.    The area thus outlined was incised deep to adipose tissue with a #15 scalpel blade.  The skin margins were undermined to an appropriate distance in all directions around the primary defect and laterally outward around the island pedicle utilizing iris scissors.  There was minimal undermining beneath the pedicle flap.
Island Pedicle Flap With Canthal Suspension Text: The defect edges were debeveled with a #15 scalpel blade.  Given the location of the defect, shape of the defect and the proximity to free margins an island pedicle advancement flap was deemed most appropriate.  Using a sterile surgical marker, an appropriate advancement flap was drawn incorporating the defect, outlining the appropriate donor tissue and placing the expected incisions within the relaxed skin tension lines where possible. The area thus outlined was incised deep to adipose tissue with a #15 scalpel blade.  The skin margins were undermined to an appropriate distance in all directions around the primary defect and laterally outward around the island pedicle utilizing iris scissors.  There was minimal undermining beneath the pedicle flap. A suspension suture was placed in the canthal tendon to prevent tension and prevent ectropion.
Alar Island Pedicle Flap Text: The defect edges were debeveled with a #15 scalpel blade.  Given the location of the defect, shape of the defect and the proximity to the alar rim an island pedicle advancement flap was deemed most appropriate.  Using a sterile surgical marker, an appropriate advancement flap was drawn incorporating the defect, outlining the appropriate donor tissue and placing the expected incisions within the nasal ala running parallel to the alar rim. The area thus outlined was incised with a #15 scalpel blade.  The skin margins were undermined minimally to an appropriate distance in all directions around the primary defect and laterally outward around the island pedicle utilizing iris scissors.  There was minimal undermining beneath the pedicle flap.
Double Island Pedicle Flap Text: The defect edges were debeveled with a #15 scalpel blade.  Given the location of the defect, shape of the defect and the proximity to free margins a double island pedicle advancement flap was deemed most appropriate.  Using a sterile surgical marker, an appropriate advancement flap was drawn incorporating the defect, outlining the appropriate donor tissue and placing the expected incisions within the relaxed skin tension lines where possible.    The area thus outlined was incised deep to adipose tissue with a #15 scalpel blade.  The skin margins were undermined to an appropriate distance in all directions around the primary defect and laterally outward around the island pedicle utilizing iris scissors.  There was minimal undermining beneath the pedicle flap.
Island Pedicle Flap-Requiring Vessel Identification Text: The defect edges were debeveled with a #15 scalpel blade.  Given the location of the defect, shape of the defect and the proximity to free margins an island pedicle advancement flap was deemed most appropriate.  Using a sterile surgical marker, an appropriate advancement flap was drawn, based on the axial vessel mentioned above, incorporating the defect, outlining the appropriate donor tissue and placing the expected incisions within the relaxed skin tension lines where possible.    The area thus outlined was incised deep to adipose tissue with a #15 scalpel blade.  The skin margins were undermined to an appropriate distance in all directions around the primary defect and laterally outward around the island pedicle utilizing iris scissors.  There was minimal undermining beneath the pedicle flap.
Keystone Flap Text: The defect edges were debeveled with a #15 scalpel blade.  Given the location of the defect, shape of the defect a keystone flap was deemed most appropriate.  Using a sterile surgical marker, an appropriate keystone flap was drawn incorporating the defect, outlining the appropriate donor tissue and placing the expected incisions within the relaxed skin tension lines where possible. The area thus outlined was incised deep to adipose tissue with a #15 scalpel blade.  The skin margins were undermined to an appropriate distance in all directions around the primary defect and laterally outward around the flap utilizing iris scissors.
O-T Plasty Text: The defect edges were debeveled with a #15 scalpel blade.  Given the location of the defect, shape of the defect and the proximity to free margins an O-T plasty was deemed most appropriate.  Using a sterile surgical marker, an appropriate O-T plasty was drawn incorporating the defect and placing the expected incisions within the relaxed skin tension lines where possible.    The area thus outlined was incised deep to adipose tissue with a #15 scalpel blade.  The skin margins were undermined to an appropriate distance in all directions utilizing iris scissors.
O-Z Plasty Text: The defect edges were debeveled with a #15 scalpel blade.  Given the location of the defect, shape of the defect and the proximity to free margins an O-Z plasty (double transposition flap) was deemed most appropriate.  Using a sterile surgical marker, the appropriate transposition flaps were drawn incorporating the defect and placing the expected incisions within the relaxed skin tension lines where possible.    The area thus outlined was incised deep to adipose tissue with a #15 scalpel blade.  The skin margins were undermined to an appropriate distance in all directions utilizing iris scissors.  Hemostasis was achieved with electrocautery.  The flaps were then transposed into place, one clockwise and the other counterclockwise, and anchored with interrupted buried subcutaneous sutures.
Double O-Z Plasty Text: The defect edges were debeveled with a #15 scalpel blade.  Given the location of the defect, shape of the defect and the proximity to free margins a Double O-Z plasty (double transposition flap) was deemed most appropriate.  Using a sterile surgical marker, the appropriate transposition flaps were drawn incorporating the defect and placing the expected incisions within the relaxed skin tension lines where possible. The area thus outlined was incised deep to adipose tissue with a #15 scalpel blade.  The skin margins were undermined to an appropriate distance in all directions utilizing iris scissors.  Hemostasis was achieved with electrocautery.  The flaps were then transposed into place, one clockwise and the other counterclockwise, and anchored with interrupted buried subcutaneous sutures.
V-Y Plasty Text: The defect edges were debeveled with a #15 scalpel blade.  Given the location of the defect, shape of the defect and the proximity to free margins an V-Y advancement flap was deemed most appropriate.  Using a sterile surgical marker, an appropriate advancement flap was drawn incorporating the defect and placing the expected incisions within the relaxed skin tension lines where possible.    The area thus outlined was incised deep to adipose tissue with a #15 scalpel blade.  The skin margins were undermined to an appropriate distance in all directions utilizing iris scissors.
H Plasty Text: Given the location of the defect, shape of the defect and the proximity to free margins a H-plasty was deemed most appropriate for repair.  Using a sterile surgical marker, the appropriate advancement arms of the H-plasty were drawn incorporating the defect and placing the expected incisions within the relaxed skin tension lines where possible. The area thus outlined was incised deep to adipose tissue with a #15 scalpel blade. The skin margins were undermined to an appropriate distance in all directions utilizing iris scissors.  The opposing advancement arms were then advanced into place in opposite direction and anchored with interrupted buried subcutaneous sutures.
W Plasty Text: The lesion was extirpated to the level of the fat with a #15 scalpel blade.  Given the location of the defect, shape of the defect and the proximity to free margins a W-plasty was deemed most appropriate for repair.  Using a sterile surgical marker, the appropriate transposition arms of the W-plasty were drawn incorporating the defect and placing the expected incisions within the relaxed skin tension lines where possible.    The area thus outlined was incised deep to adipose tissue with a #15 scalpel blade.  The skin margins were undermined to an appropriate distance in all directions utilizing iris scissors.  The opposing transposition arms were then transposed into place in opposite direction and anchored with interrupted buried subcutaneous sutures.
Z Plasty Text: The lesion was extirpated to the level of the fat with a #15 scalpel blade.  Given the location of the defect, shape of the defect and the proximity to free margins a Z-plasty was deemed most appropriate for repair.  Using a sterile surgical marker, the appropriate transposition arms of the Z-plasty were drawn incorporating the defect and placing the expected incisions within the relaxed skin tension lines where possible.    The area thus outlined was incised deep to adipose tissue with a #15 scalpel blade.  The skin margins were undermined to an appropriate distance in all directions utilizing iris scissors.  The opposing transposition arms were then transposed into place in opposite direction and anchored with interrupted buried subcutaneous sutures.
Double Z Plasty Text: The lesion was extirpated to the level of the fat with a #15 scalpel blade. Given the location of the defect, shape of the defect and the proximity to free margins a double Z-plasty was deemed most appropriate for repair. Using a sterile surgical marker, the appropriate transposition arms of the double Z-plasty were drawn incorporating the defect and placing the expected incisions within the relaxed skin tension lines where possible. The area thus outlined was incised deep to adipose tissue with a #15 scalpel blade. The skin margins were undermined to an appropriate distance in all directions utilizing iris scissors. The opposing transposition arms were then transposed and carried over into place in opposite direction and anchored with interrupted buried subcutaneous sutures.
Zygomaticofacial Flap Text: Given the location of the defect, shape of the defect and the proximity to free margins a zygomaticofacial flap was deemed most appropriate for repair.  Using a sterile surgical marker, the appropriate flap was drawn incorporating the defect and placing the expected incisions within the relaxed skin tension lines where possible. The area thus outlined was incised deep to adipose tissue with a #15 scalpel blade with preservation of a vascular pedicle.  The skin margins were undermined to an appropriate distance in all directions utilizing iris scissors.  The flap was then placed into the defect and anchored with interrupted buried subcutaneous sutures.
Cheek Interpolation Flap Text: A decision was made to reconstruct the defect utilizing an interpolation axial flap and a staged reconstruction.  A telfa template was made of the defect.  This telfa template was then used to outline the Cheek Interpolation flap.  The donor area for the pedicle flap was then injected with anesthesia.  The flap was excised through the skin and subcutaneous tissue down to the layer of the underlying musculature.  The interpolation flap was carefully excised within this deep plane to maintain its blood supply.  The edges of the donor site were undermined.   The donor site was closed in a primary fashion.  The pedicle was then rotated into position and sutured.  Once the tube was sutured into place, adequate blood supply was confirmed with blanching and refill.  The pedicle was then wrapped with xeroform gauze and dressed appropriately with a telfa and gauze bandage to ensure continued blood supply and protect the attached pedicle.
Cheek-To-Nose Interpolation Flap Text: A decision was made to reconstruct the defect utilizing an interpolation axial flap and a staged reconstruction.  A telfa template was made of the defect.  This telfa template was then used to outline the Cheek-To-Nose Interpolation flap.  The donor area for the pedicle flap was then injected with anesthesia.  The flap was excised through the skin and subcutaneous tissue down to the layer of the underlying musculature.  The interpolation flap was carefully excised within this deep plane to maintain its blood supply.  The edges of the donor site were undermined.   The donor site was closed in a primary fashion.  The pedicle was then rotated into position and sutured.  Once the tube was sutured into place, adequate blood supply was confirmed with blanching and refill.  The pedicle was then wrapped with xeroform gauze and dressed appropriately with a telfa and gauze bandage to ensure continued blood supply and protect the attached pedicle.
Interpolation Flap Text: A decision was made to reconstruct the defect utilizing an interpolation axial flap and a staged reconstruction.  A telfa template was made of the defect.  This telfa template was then used to outline the interpolation flap.  The donor area for the pedicle flap was then injected with anesthesia.  The flap was excised through the skin and subcutaneous tissue down to the layer of the underlying musculature.  The interpolation flap was carefully excised within this deep plane to maintain its blood supply.  The edges of the donor site were undermined.   The donor site was closed in a primary fashion.  The pedicle was then rotated into position and sutured.  Once the tube was sutured into place, adequate blood supply was confirmed with blanching and refill.  The pedicle was then wrapped with xeroform gauze and dressed appropriately with a telfa and gauze bandage to ensure continued blood supply and protect the attached pedicle.
Melolabial Interpolation Flap Text: A decision was made to reconstruct the defect utilizing an interpolation axial flap and a staged reconstruction.  A telfa template was made of the defect.  This telfa template was then used to outline the melolabial interpolation flap.  The donor area for the pedicle flap was then injected with anesthesia.  The flap was excised through the skin and subcutaneous tissue down to the layer of the underlying musculature.  The pedicle flap was carefully excised within this deep plane to maintain its blood supply.  The edges of the donor site were undermined.   The donor site was closed in a primary fashion.  The pedicle was then rotated into position and sutured.  Once the tube was sutured into place, adequate blood supply was confirmed with blanching and refill.  The pedicle was then wrapped with xeroform gauze and dressed appropriately with a telfa and gauze bandage to ensure continued blood supply and protect the attached pedicle.
Mastoid Interpolation Flap Text: A decision was made to reconstruct the defect utilizing an interpolation axial flap and a staged reconstruction.  A telfa template was made of the defect.  This telfa template was then used to outline the mastoid interpolation flap.  The donor area for the pedicle flap was then injected with anesthesia.  The flap was excised through the skin and subcutaneous tissue down to the layer of the underlying musculature.  The pedicle flap was carefully excised within this deep plane to maintain its blood supply.  The edges of the donor site were undermined.   The donor site was closed in a primary fashion.  The pedicle was then rotated into position and sutured.  Once the tube was sutured into place, adequate blood supply was confirmed with blanching and refill.  The pedicle was then wrapped with xeroform gauze and dressed appropriately with a telfa and gauze bandage to ensure continued blood supply and protect the attached pedicle.
Posterior Auricular Interpolation Flap Text: A decision was made to reconstruct the defect utilizing an interpolation axial flap and a staged reconstruction.  A telfa template was made of the defect.  This telfa template was then used to outline the posterior auricular interpolation flap.  The donor area for the pedicle flap was then injected with anesthesia.  The flap was excised through the skin and subcutaneous tissue down to the layer of the underlying musculature.  The pedicle flap was carefully excised within this deep plane to maintain its blood supply.  The edges of the donor site were undermined.   The donor site was closed in a primary fashion.  The pedicle was then rotated into position and sutured.  Once the tube was sutured into place, adequate blood supply was confirmed with blanching and refill.  The pedicle was then wrapped with xeroform gauze and dressed appropriately with a telfa and gauze bandage to ensure continued blood supply and protect the attached pedicle.
Paramedian Forehead Flap Text: A decision was made to reconstruct the defect utilizing an interpolation axial flap and a staged reconstruction.  A telfa template was made of the defect.  This telfa template was then used to outline the paramedian forehead pedicle flap.  The donor area for the pedicle flap was then injected with anesthesia.  The flap was excised through the skin and subcutaneous tissue down to the layer of the underlying musculature.  The pedicle flap was carefully excised within this deep plane to maintain its blood supply.  The edges of the donor site were undermined.   The donor site was closed in a primary fashion.  The pedicle was then rotated into position and sutured.  Once the tube was sutured into place, adequate blood supply was confirmed with blanching and refill.  The pedicle was then wrapped with xeroform gauze and dressed appropriately with a telfa and gauze bandage to ensure continued blood supply and protect the attached pedicle.
Abbe Flap (Upper To Lower Lip) Text: The defect of the lower lip was assessed and measured.  Given the location and size of the defect, an Abbe flap was deemed most appropriate. Using a sterile surgical marker, an appropriate Abbe flap was measured and drawn on the upper lip. Local anesthesia was then infiltrated.  A scalpel was then used to incise the upper lip through and through the skin, vermilion, muscle and mucosa, leaving the flap pedicled on the opposite side.  The flap was then rotated and transferred to the lower lip defect.  The flap was then sutured into place with a three layer technique, closing the orbicularis oris muscle layer with subcutaneous buried sutures, followed by a mucosal layer and an epidermal layer.
Abbe Flap (Lower To Upper Lip) Text: The defect of the upper lip was assessed and measured.  Given the location and size of the defect, an Abbe flap was deemed most appropriate. Using a sterile surgical marker, an appropriate Abbe flap was measured and drawn on the lower lip. Local anesthesia was then infiltrated. A scalpel was then used to incise the upper lip through and through the skin, vermilion, muscle and mucosa, leaving the flap pedicled on the opposite side.  The flap was then rotated and transferred to the lower lip defect.  The flap was then sutured into place with a three layer technique, closing the orbicularis oris muscle layer with subcutaneous buried sutures, followed by a mucosal layer and an epidermal layer.
Estlander Flap (Upper To Lower Lip) Text: The defect of the lower lip was assessed and measured.  Given the location and size of the defect, an Estlander flap was deemed most appropriate. Using a sterile surgical marker, an appropriate Estlander flap was measured and drawn on the upper lip. Local anesthesia was then infiltrated. A scalpel was then used to incise the lateral aspect of the flap, through skin, muscle and mucosa, leaving the flap pedicled medially.  The flap was then rotated and positioned to fill the lower lip defect.  The flap was then sutured into place with a three layer technique, closing the orbicularis oris muscle layer with subcutaneous buried sutures, followed by a mucosal layer and an epidermal layer.
Lip Wedge Excision Repair Text: Given the location of the defect and the proximity to free margins a full thickness wedge repair was deemed most appropriate.  Using a sterile surgical marker, the appropriate repair was drawn incorporating the defect and placing the expected incisions perpendicular to the vermilion border.  The vermilion border was also meticulously outlined to ensure appropriate reapproximation during the repair.  The area thus outlined was incised through and through with a #15 scalpel blade.  The muscularis and dermis were reaproximated with deep sutures following hemostasis. Care was taken to realign the vermilion border before proceeding with the superficial closure.  Once the vermilion was realigned the superfical and mucosal closure was finished.
Ftsg Text: The defect edges were debeveled with a #15 scalpel blade.  Given the location of the defect, shape of the defect and the proximity to free margins a full thickness skin graft was deemed most appropriate.  Using a sterile surgical marker, the primary defect shape was transferred to the donor site. The area thus outlined was incised deep to adipose tissue with a #15 scalpel blade.  The harvested graft was then trimmed of adipose tissue until only dermis and epidermis was left.  The skin margins of the secondary defect were undermined to an appropriate distance in all directions utilizing iris scissors.  The secondary defect was closed with interrupted buried subcutaneous sutures.  The skin edges were then re-apposed with running  sutures.  The skin graft was then placed in the primary defect and oriented appropriately.
Split-Thickness Skin Graft Text: The defect edges were debeveled with a #15 scalpel blade.  Given the location of the defect, shape of the defect and the proximity to free margins a split thickness skin graft was deemed most appropriate.  Using a sterile surgical marker, the primary defect shape was transferred to the donor site. The split thickness graft was then harvested.  The skin graft was then placed in the primary defect and oriented appropriately.
Pinch Graft Text: The defect edges were debeveled with a #15 scalpel blade. Given the location of the defect, shape of the defect and the proximity to free margins a pinch graft was deemed most appropriate. Using a sterile surgical marker, the primary defect shape was transferred to the donor site. The area thus outlined was incised deep to adipose tissue with a #15 scalpel blade.  The harvested graft was then trimmed of adipose tissue until only dermis and epidermis was left. The skin graft was then placed in the primary defect and oriented appropriately.
Burow's Graft Text: The defect edges were debeveled with a #15 scalpel blade.  Given the location of the defect, shape of the defect, the proximity to free margins and the presence of a standing cone deformity a Burow's skin graft was deemed most appropriate. The standing cone was removed and this tissue was then trimmed to the shape of the primary defect. The adipose tissue was also removed until only dermis and epidermis were left.  The skin margins of the secondary defect were undermined to an appropriate distance in all directions utilizing iris scissors.  The secondary defect was closed with interrupted buried subcutaneous sutures.  The skin edges were then re-apposed with running  sutures.  The skin graft was then placed in the primary defect and oriented appropriately.
Cartilage Graft Text: The defect edges were debeveled with a #15 scalpel blade.  Given the location of the defect, shape of the defect, the fact the defect involved a full thickness cartilage defect a cartilage graft was deemed most appropriate.  An appropriate donor site was identified, cleansed, and anesthetized. The cartilage graft was then harvested and transferred to the recipient site, oriented appropriately and then sutured into place.  The secondary defect was then repaired using a primary closure.
Composite Graft Text: The defect edges were debeveled with a #15 scalpel blade.  Given the location of the defect, shape of the defect, the proximity to free margins and the fact the defect was full thickness a composite graft was deemed most appropriate.  The defect was outline and then transferred to the donor site.  A full thickness graft was then excised from the donor site. The graft was then placed in the primary defect, oriented appropriately and then sutured into place.  The secondary defect was then repaired using a primary closure.
Epidermal Autograft Text: The defect edges were debeveled with a #15 scalpel blade.  Given the location of the defect, shape of the defect and the proximity to free margins an epidermal autograft was deemed most appropriate.  Using a sterile surgical marker, the primary defect shape was transferred to the donor site. The epidermal graft was then harvested.  The skin graft was then placed in the primary defect and oriented appropriately.
Dermal Autograft Text: The defect edges were debeveled with a #15 scalpel blade.  Given the location of the defect, shape of the defect and the proximity to free margins a dermal autograft was deemed most appropriate.  Using a sterile surgical marker, the primary defect shape was transferred to the donor site. The area thus outlined was incised deep to adipose tissue with a #15 scalpel blade.  The harvested graft was then trimmed of adipose and epidermal tissue until only dermis was left.  The skin graft was then placed in the primary defect and oriented appropriately.
Skin Substitute Text: The defect edges were debeveled with a #15 scalpel blade.  Given the location of the defect, shape of the defect and the proximity to free margins a skin substitute graft was deemed most appropriate.  The graft material was trimmed to fit the size of the defect. The graft was then placed in the primary defect and oriented appropriately.
Tissue Cultured Epidermal Autograft Text: The defect edges were debeveled with a #15 scalpel blade.  Given the location of the defect, shape of the defect and the proximity to free margins a tissue cultured epidermal autograft was deemed most appropriate.  The graft was then trimmed to fit the size of the defect.  The graft was then placed in the primary defect and oriented appropriately.
Xenograft Text: The defect edges were debeveled with a #15 scalpel blade.  Given the location of the defect, shape of the defect and the proximity to free margins a xenograft was deemed most appropriate.  The graft was then trimmed to fit the size of the defect.  The graft was then placed in the primary defect and oriented appropriately.
Purse String (Intermediate) Text: Given the location of the defect and the characteristics of the surrounding skin a purse string intermediate closure was deemed most appropriate.  Undermining was performed circumferentially around the surgical defect.  A purse string suture was then placed and tightened.
Purse String (Simple) Text: Given the location of the defect and the characteristics of the surrounding skin a purse string simple closure was deemed most appropriate.  Undermining was performed circumferentially around the surgical defect.  A purse string suture was then placed and tightened.
Complex Repair And Single Advancement Flap Text: The defect edges were debeveled with a #15 scalpel blade.  The primary defect was closed partially with a complex linear closure.  Given the location of the remaining defect, shape of the defect and the proximity to free margins a single advancement flap was deemed most appropriate for complete closure of the defect.  Using a sterile surgical marker, an appropriate advancement flap was drawn incorporating the defect and placing the expected incisions within the relaxed skin tension lines where possible.    The area thus outlined was incised deep to adipose tissue with a #15 scalpel blade.  The skin margins were undermined to an appropriate distance in all directions utilizing iris scissors.
Complex Repair And Double Advancement Flap Text: The defect edges were debeveled with a #15 scalpel blade.  The primary defect was closed partially with a complex linear closure.  Given the location of the remaining defect, shape of the defect and the proximity to free margins a double advancement flap was deemed most appropriate for complete closure of the defect.  Using a sterile surgical marker, an appropriate advancement flap was drawn incorporating the defect and placing the expected incisions within the relaxed skin tension lines where possible.    The area thus outlined was incised deep to adipose tissue with a #15 scalpel blade.  The skin margins were undermined to an appropriate distance in all directions utilizing iris scissors.
Complex Repair And Modified Advancement Flap Text: The defect edges were debeveled with a #15 scalpel blade.  The primary defect was closed partially with a complex linear closure.  Given the location of the remaining defect, shape of the defect and the proximity to free margins a modified advancement flap was deemed most appropriate for complete closure of the defect.  Using a sterile surgical marker, an appropriate advancement flap was drawn incorporating the defect and placing the expected incisions within the relaxed skin tension lines where possible.    The area thus outlined was incised deep to adipose tissue with a #15 scalpel blade.  The skin margins were undermined to an appropriate distance in all directions utilizing iris scissors.
Complex Repair And A-T Advancement Flap Text: The defect edges were debeveled with a #15 scalpel blade.  The primary defect was closed partially with a complex linear closure.  Given the location of the remaining defect, shape of the defect and the proximity to free margins an A-T advancement flap was deemed most appropriate for complete closure of the defect.  Using a sterile surgical marker, an appropriate advancement flap was drawn incorporating the defect and placing the expected incisions within the relaxed skin tension lines where possible.    The area thus outlined was incised deep to adipose tissue with a #15 scalpel blade.  The skin margins were undermined to an appropriate distance in all directions utilizing iris scissors.
Complex Repair And O-T Advancement Flap Text: The defect edges were debeveled with a #15 scalpel blade.  The primary defect was closed partially with a complex linear closure.  Given the location of the remaining defect, shape of the defect and the proximity to free margins an O-T advancement flap was deemed most appropriate for complete closure of the defect.  Using a sterile surgical marker, an appropriate advancement flap was drawn incorporating the defect and placing the expected incisions within the relaxed skin tension lines where possible.    The area thus outlined was incised deep to adipose tissue with a #15 scalpel blade.  The skin margins were undermined to an appropriate distance in all directions utilizing iris scissors.
Complex Repair And O-L Flap Text: The defect edges were debeveled with a #15 scalpel blade.  The primary defect was closed partially with a complex linear closure.  Given the location of the remaining defect, shape of the defect and the proximity to free margins an O-L flap was deemed most appropriate for complete closure of the defect.  Using a sterile surgical marker, an appropriate flap was drawn incorporating the defect and placing the expected incisions within the relaxed skin tension lines where possible.    The area thus outlined was incised deep to adipose tissue with a #15 scalpel blade.  The skin margins were undermined to an appropriate distance in all directions utilizing iris scissors.
Complex Repair And Bilobe Flap Text: The defect edges were debeveled with a #15 scalpel blade.  The primary defect was closed partially with a complex linear closure.  Given the location of the remaining defect, shape of the defect and the proximity to free margins a bilobe flap was deemed most appropriate for complete closure of the defect.  Using a sterile surgical marker, an appropriate advancement flap was drawn incorporating the defect and placing the expected incisions within the relaxed skin tension lines where possible.    The area thus outlined was incised deep to adipose tissue with a #15 scalpel blade.  The skin margins were undermined to an appropriate distance in all directions utilizing iris scissors.
Complex Repair And Melolabial Flap Text: The defect edges were debeveled with a #15 scalpel blade.  The primary defect was closed partially with a complex linear closure.  Given the location of the remaining defect, shape of the defect and the proximity to free margins a melolabial flap was deemed most appropriate for complete closure of the defect.  Using a sterile surgical marker, an appropriate advancement flap was drawn incorporating the defect and placing the expected incisions within the relaxed skin tension lines where possible.    The area thus outlined was incised deep to adipose tissue with a #15 scalpel blade.  The skin margins were undermined to an appropriate distance in all directions utilizing iris scissors.
Complex Repair And Rotation Flap Text: The defect edges were debeveled with a #15 scalpel blade.  The primary defect was closed partially with a complex linear closure.  Given the location of the remaining defect, shape of the defect and the proximity to free margins a rotation flap was deemed most appropriate for complete closure of the defect.  Using a sterile surgical marker, an appropriate advancement flap was drawn incorporating the defect and placing the expected incisions within the relaxed skin tension lines where possible.    The area thus outlined was incised deep to adipose tissue with a #15 scalpel blade.  The skin margins were undermined to an appropriate distance in all directions utilizing iris scissors.
Complex Repair And Rhombic Flap Text: The defect edges were debeveled with a #15 scalpel blade.  The primary defect was closed partially with a complex linear closure.  Given the location of the remaining defect, shape of the defect and the proximity to free margins a rhombic flap was deemed most appropriate for complete closure of the defect.  Using a sterile surgical marker, an appropriate advancement flap was drawn incorporating the defect and placing the expected incisions within the relaxed skin tension lines where possible.    The area thus outlined was incised deep to adipose tissue with a #15 scalpel blade.  The skin margins were undermined to an appropriate distance in all directions utilizing iris scissors.
Complex Repair And Transposition Flap Text: The defect edges were debeveled with a #15 scalpel blade.  The primary defect was closed partially with a complex linear closure.  Given the location of the remaining defect, shape of the defect and the proximity to free margins a transposition flap was deemed most appropriate for complete closure of the defect.  Using a sterile surgical marker, an appropriate advancement flap was drawn incorporating the defect and placing the expected incisions within the relaxed skin tension lines where possible.    The area thus outlined was incised deep to adipose tissue with a #15 scalpel blade.  The skin margins were undermined to an appropriate distance in all directions utilizing iris scissors.
Complex Repair And V-Y Plasty Text: The defect edges were debeveled with a #15 scalpel blade.  The primary defect was closed partially with a complex linear closure.  Given the location of the remaining defect, shape of the defect and the proximity to free margins a V-Y plasty was deemed most appropriate for complete closure of the defect.  Using a sterile surgical marker, an appropriate advancement flap was drawn incorporating the defect and placing the expected incisions within the relaxed skin tension lines where possible.    The area thus outlined was incised deep to adipose tissue with a #15 scalpel blade.  The skin margins were undermined to an appropriate distance in all directions utilizing iris scissors.
Complex Repair And M Plasty Text: The defect edges were debeveled with a #15 scalpel blade.  The primary defect was closed partially with a complex linear closure.  Given the location of the remaining defect, shape of the defect and the proximity to free margins an M plasty was deemed most appropriate for complete closure of the defect.  Using a sterile surgical marker, an appropriate advancement flap was drawn incorporating the defect and placing the expected incisions within the relaxed skin tension lines where possible.    The area thus outlined was incised deep to adipose tissue with a #15 scalpel blade.  The skin margins were undermined to an appropriate distance in all directions utilizing iris scissors.
Complex Repair And Double M Plasty Text: The defect edges were debeveled with a #15 scalpel blade.  The primary defect was closed partially with a complex linear closure.  Given the location of the remaining defect, shape of the defect and the proximity to free margins a double M plasty was deemed most appropriate for complete closure of the defect.  Using a sterile surgical marker, an appropriate advancement flap was drawn incorporating the defect and placing the expected incisions within the relaxed skin tension lines where possible.    The area thus outlined was incised deep to adipose tissue with a #15 scalpel blade.  The skin margins were undermined to an appropriate distance in all directions utilizing iris scissors.
Complex Repair And W Plasty Text: The defect edges were debeveled with a #15 scalpel blade.  The primary defect was closed partially with a complex linear closure.  Given the location of the remaining defect, shape of the defect and the proximity to free margins a W plasty was deemed most appropriate for complete closure of the defect.  Using a sterile surgical marker, an appropriate advancement flap was drawn incorporating the defect and placing the expected incisions within the relaxed skin tension lines where possible.    The area thus outlined was incised deep to adipose tissue with a #15 scalpel blade.  The skin margins were undermined to an appropriate distance in all directions utilizing iris scissors.
Complex Repair And Z Plasty Text: The defect edges were debeveled with a #15 scalpel blade.  The primary defect was closed partially with a complex linear closure.  Given the location of the remaining defect, shape of the defect and the proximity to free margins a Z plasty was deemed most appropriate for complete closure of the defect.  Using a sterile surgical marker, an appropriate advancement flap was drawn incorporating the defect and placing the expected incisions within the relaxed skin tension lines where possible.    The area thus outlined was incised deep to adipose tissue with a #15 scalpel blade.  The skin margins were undermined to an appropriate distance in all directions utilizing iris scissors.
Complex Repair And Dorsal Nasal Flap Text: The defect edges were debeveled with a #15 scalpel blade.  The primary defect was closed partially with a complex linear closure.  Given the location of the remaining defect, shape of the defect and the proximity to free margins a dorsal nasal flap was deemed most appropriate for complete closure of the defect.  Using a sterile surgical marker, an appropriate flap was drawn incorporating the defect and placing the expected incisions within the relaxed skin tension lines where possible.    The area thus outlined was incised deep to adipose tissue with a #15 scalpel blade.  The skin margins were undermined to an appropriate distance in all directions utilizing iris scissors.
Complex Repair And Ftsg Text: The defect edges were debeveled with a #15 scalpel blade.  The primary defect was closed partially with a complex linear closure.  Given the location of the defect, shape of the defect and the proximity to free margins a full thickness skin graft was deemed most appropriate to repair the remaining defect.  The graft was trimmed to fit the size of the remaining defect.  The graft was then placed in the primary defect, oriented appropriately, and sutured into place.
Complex Repair And Burow's Graft Text: The defect edges were debeveled with a #15 scalpel blade.  The primary defect was closed partially with a complex linear closure.  Given the location of the defect, shape of the defect, the proximity to free margins and the presence of a standing cone deformity a Burow's graft was deemed most appropriate to repair the remaining defect.  The graft was trimmed to fit the size of the remaining defect.  The graft was then placed in the primary defect, oriented appropriately, and sutured into place.
Complex Repair And Split-Thickness Skin Graft Text: The defect edges were debeveled with a #15 scalpel blade.  The primary defect was closed partially with a complex linear closure.  Given the location of the defect, shape of the defect and the proximity to free margins a split thickness skin graft was deemed most appropriate to repair the remaining defect.  The graft was trimmed to fit the size of the remaining defect.  The graft was then placed in the primary defect, oriented appropriately, and sutured into place.
Complex Repair And Epidermal Autograft Text: The defect edges were debeveled with a #15 scalpel blade.  The primary defect was closed partially with a complex linear closure.  Given the location of the defect, shape of the defect and the proximity to free margins an epidermal autograft was deemed most appropriate to repair the remaining defect.  The graft was trimmed to fit the size of the remaining defect.  The graft was then placed in the primary defect, oriented appropriately, and sutured into place.
Complex Repair And Dermal Autograft Text: The defect edges were debeveled with a #15 scalpel blade.  The primary defect was closed partially with a complex linear closure.  Given the location of the defect, shape of the defect and the proximity to free margins an dermal autograft was deemed most appropriate to repair the remaining defect.  The graft was trimmed to fit the size of the remaining defect.  The graft was then placed in the primary defect, oriented appropriately, and sutured into place.
Complex Repair And Tissue Cultured Epidermal Autograft Text: The defect edges were debeveled with a #15 scalpel blade.  The primary defect was closed partially with a complex linear closure.  Given the location of the defect, shape of the defect and the proximity to free margins an tissue cultured epidermal autograft was deemed most appropriate to repair the remaining defect.  The graft was trimmed to fit the size of the remaining defect.  The graft was then placed in the primary defect, oriented appropriately, and sutured into place.
Complex Repair And Xenograft Text: The defect edges were debeveled with a #15 scalpel blade.  The primary defect was closed partially with a complex linear closure.  Given the location of the defect, shape of the defect and the proximity to free margins a xenograft was deemed most appropriate to repair the remaining defect.  The graft was trimmed to fit the size of the remaining defect.  The graft was then placed in the primary defect, oriented appropriately, and sutured into place.
Complex Repair And Skin Substitute Graft Text: The defect edges were debeveled with a #15 scalpel blade.  The primary defect was closed partially with a complex linear closure.  Given the location of the remaining defect, shape of the defect and the proximity to free margins a skin substitute graft was deemed most appropriate to repair the remaining defect.  The graft was trimmed to fit the size of the remaining defect.  The graft was then placed in the primary defect, oriented appropriately, and sutured into place.
Path Notes (To The Dermatopathologist): Please check margins.
Consent was obtained from the patient. The risks and benefits to therapy were discussed in detail. Specifically, the risks of infection, scarring, bleeding, prolonged wound healing, incomplete removal, allergy to anesthesia, nerve injury and recurrence were addressed. Prior to the procedure, the treatment site was clearly identified and confirmed by the patient. All components of Universal Protocol/PAUSE Rule completed.
Post-Care Instructions: I reviewed with the patient in detail post-care instructions:\\n1. Apply bacitracin over the scar line \\n2. Cut non-stick pad (Telfa) to cover the ointment and scar line \\n3. Apply tape (hypafix) over the non-stick pad\\n4. Change once per day for 8 days\\n5. Shower with bandage on, change bandage after shower\\n\\nPatient is not to engage in any heavy lifting, exercise, hot tub, or swimming for the next 14 days. Should the patient develop any fevers, chills, bleeding, severe pain patient will contact the office immediately.
Home Suture Removal Text: Patient was provided a home suture removal kit and will remove their sutures at home.  If they have any questions or difficulties they will call the office.
Where Do You Want The Question To Include Opioid Counseling Located?: Case Summary Tab
Billing Type: Third-Party Bill
Information: Selecting Yes will display possible errors in your note based on the variables you have selected. This validation is only offered as a suggestion for you. PLEASE NOTE THAT THE VALIDATION TEXT WILL BE REMOVED WHEN YOU FINALIZE YOUR NOTE. IF YOU WANT TO FAX A PRELIMINARY NOTE YOU WILL NEED TO TOGGLE THIS TO 'NO' IF YOU DO NOT WANT IT IN YOUR FAXED NOTE.

## 2025-03-11 ENCOUNTER — APPOINTMENT (OUTPATIENT)
Dept: URBAN - METROPOLITAN AREA CLINIC 20 | Facility: CLINIC | Age: 45
Setting detail: DERMATOLOGY
End: 2025-03-11

## 2025-03-11 DIAGNOSIS — Z87.2 PERSONAL HISTORY OF DISEASES OF THE SKIN AND SUBCUTANEOUS TISSUE: ICD-10-CM

## 2025-03-11 DIAGNOSIS — L57.8 OTHER SKIN CHANGES DUE TO CHRONIC EXPOSURE TO NONIONIZING RADIATION: ICD-10-CM

## 2025-03-11 DIAGNOSIS — D18.0 HEMANGIOMA: ICD-10-CM

## 2025-03-11 DIAGNOSIS — D22 MELANOCYTIC NEVI: ICD-10-CM

## 2025-03-11 DIAGNOSIS — L82.1 OTHER SEBORRHEIC KERATOSIS: ICD-10-CM

## 2025-03-11 DIAGNOSIS — L81.1 CHLOASMA: ICD-10-CM

## 2025-03-11 DIAGNOSIS — L81.4 OTHER MELANIN HYPERPIGMENTATION: ICD-10-CM

## 2025-03-11 PROBLEM — D22.62 MELANOCYTIC NEVI OF LEFT UPPER LIMB, INCLUDING SHOULDER: Status: ACTIVE | Noted: 2025-03-11

## 2025-03-11 PROBLEM — D22.39 MELANOCYTIC NEVI OF OTHER PARTS OF FACE: Status: ACTIVE | Noted: 2025-03-11

## 2025-03-11 PROBLEM — D22.71 MELANOCYTIC NEVI OF RIGHT LOWER LIMB, INCLUDING HIP: Status: ACTIVE | Noted: 2025-03-11

## 2025-03-11 PROBLEM — D48.5 NEOPLASM OF UNCERTAIN BEHAVIOR OF SKIN: Status: ACTIVE | Noted: 2025-03-11

## 2025-03-11 PROBLEM — D18.01 HEMANGIOMA OF SKIN AND SUBCUTANEOUS TISSUE: Status: ACTIVE | Noted: 2025-03-11

## 2025-03-11 PROBLEM — D22.72 MELANOCYTIC NEVI OF LEFT LOWER LIMB, INCLUDING HIP: Status: ACTIVE | Noted: 2025-03-11

## 2025-03-11 PROBLEM — D22.5 MELANOCYTIC NEVI OF TRUNK: Status: ACTIVE | Noted: 2025-03-11

## 2025-03-11 PROBLEM — D22.61 MELANOCYTIC NEVI OF RIGHT UPPER LIMB, INCLUDING SHOULDER: Status: ACTIVE | Noted: 2025-03-11

## 2025-03-11 PROCEDURE — 99213 OFFICE O/P EST LOW 20 MIN: CPT | Mod: 25

## 2025-03-11 PROCEDURE — ? COUNSELING

## 2025-03-11 PROCEDURE — ? BIOPSY BY SHAVE METHOD

## 2025-03-11 PROCEDURE — ? OBSERVATION

## 2025-03-11 PROCEDURE — ? DIAGNOSIS COMMENT

## 2025-03-11 PROCEDURE — 11102 TANGNTL BX SKIN SINGLE LES: CPT

## 2025-03-11 PROCEDURE — 11103 TANGNTL BX SKIN EA SEP/ADDL: CPT

## 2025-03-11 ASSESSMENT — LOCATION DETAILED DESCRIPTION DERM
LOCATION DETAILED: RIGHT PROXIMAL MEDIAL CALF
LOCATION DETAILED: LEFT DISTAL POSTERIOR THIGH
LOCATION DETAILED: RIGHT MID-UPPER BACK
LOCATION DETAILED: NASAL ROOT
LOCATION DETAILED: LEFT ANTERIOR DISTAL THIGH
LOCATION DETAILED: RIGHT DISTAL POSTERIOR UPPER ARM
LOCATION DETAILED: RIGHT LATERAL BUTTOCK
LOCATION DETAILED: RIGHT ULNAR DORSAL HAND
LOCATION DETAILED: LEFT PROXIMAL DORSAL FOREARM
LOCATION DETAILED: LEFT MEDIAL BREAST 7-8:00 REGION
LOCATION DETAILED: RIGHT SUPERIOR MEDIAL UPPER BACK
LOCATION DETAILED: RIGHT ANTERIOR PROXIMAL UPPER ARM
LOCATION DETAILED: RIGHT BUTTOCK
LOCATION DETAILED: RIGHT RADIAL DORSAL HAND
LOCATION DETAILED: LEFT DISTAL POSTERIOR UPPER ARM
LOCATION DETAILED: RIGHT CENTRAL MANDIBULAR CHEEK
LOCATION DETAILED: LEFT FOREHEAD
LOCATION DETAILED: RIGHT CENTRAL MALAR CHEEK
LOCATION DETAILED: RIGHT SUPERIOR MEDIAL LOWER BACK
LOCATION DETAILED: LEFT ANTERIOR PROXIMAL UPPER ARM
LOCATION DETAILED: RIGHT INFERIOR UPPER BACK
LOCATION DETAILED: LEFT SUPERIOR LATERAL MIDBACK
LOCATION DETAILED: RIGHT PROXIMAL DORSAL FOREARM
LOCATION DETAILED: LEFT INFERIOR CENTRAL MALAR CHEEK
LOCATION DETAILED: RIGHT FOREHEAD
LOCATION DETAILED: PERIUMBILICAL SKIN
LOCATION DETAILED: RIGHT ANTERIOR DISTAL THIGH
LOCATION DETAILED: LEFT MEDIAL UPPER BACK
LOCATION DETAILED: LEFT ANTERIOR LATERAL PROXIMAL UPPER ARM
LOCATION DETAILED: RIGHT ANTERIOR PROXIMAL THIGH
LOCATION DETAILED: LEFT MEDIAL SUPERIOR CHEST
LOCATION DETAILED: RIGHT DISTAL POSTERIOR THIGH

## 2025-03-11 ASSESSMENT — LOCATION SIMPLE DESCRIPTION DERM
LOCATION SIMPLE: RIGHT HAND
LOCATION SIMPLE: RIGHT UPPER ARM
LOCATION SIMPLE: LEFT CHEEK
LOCATION SIMPLE: RIGHT POSTERIOR UPPER ARM
LOCATION SIMPLE: RIGHT BUTTOCK
LOCATION SIMPLE: RIGHT THIGH
LOCATION SIMPLE: RIGHT FOREARM
LOCATION SIMPLE: ABDOMEN
LOCATION SIMPLE: LEFT POSTERIOR THIGH
LOCATION SIMPLE: RIGHT POSTERIOR THIGH
LOCATION SIMPLE: LEFT UPPER ARM
LOCATION SIMPLE: RIGHT UPPER BACK
LOCATION SIMPLE: LEFT UPPER BACK
LOCATION SIMPLE: LEFT FOREHEAD
LOCATION SIMPLE: CHEST
LOCATION SIMPLE: RIGHT CALF
LOCATION SIMPLE: LEFT POSTERIOR UPPER ARM
LOCATION SIMPLE: RIGHT LOWER BACK
LOCATION SIMPLE: LEFT FOREARM
LOCATION SIMPLE: NOSE
LOCATION SIMPLE: LEFT BREAST
LOCATION SIMPLE: RIGHT FOREHEAD
LOCATION SIMPLE: RIGHT CHEEK
LOCATION SIMPLE: LEFT THIGH
LOCATION SIMPLE: LEFT LOWER BACK

## 2025-03-11 ASSESSMENT — LOCATION ZONE DERM
LOCATION ZONE: HAND
LOCATION ZONE: ARM
LOCATION ZONE: LEG
LOCATION ZONE: FACE
LOCATION ZONE: NOSE
LOCATION ZONE: TRUNK

## 2025-04-19 ENCOUNTER — HOSPITAL ENCOUNTER (OUTPATIENT)
Dept: LAB | Facility: MEDICAL CENTER | Age: 45
End: 2025-04-19
Attending: PHYSICIAN ASSISTANT
Payer: COMMERCIAL

## 2025-04-19 LAB
25(OH)D3 SERPL-MCNC: 33 NG/ML (ref 30–100)
ALBUMIN SERPL BCP-MCNC: 4.1 G/DL (ref 3.2–4.9)
ALBUMIN/GLOB SERPL: 1.5 G/DL
ALP SERPL-CCNC: 62 U/L (ref 30–99)
ALT SERPL-CCNC: 9 U/L (ref 2–50)
ANION GAP SERPL CALC-SCNC: 9 MMOL/L (ref 7–16)
AST SERPL-CCNC: 14 U/L (ref 12–45)
BASOPHILS # BLD AUTO: 0.5 % (ref 0–1.8)
BASOPHILS # BLD: 0.03 K/UL (ref 0–0.12)
BILIRUB SERPL-MCNC: 0.5 MG/DL (ref 0.1–1.5)
BUN SERPL-MCNC: 11 MG/DL (ref 8–22)
CALCIUM ALBUM COR SERPL-MCNC: 8.9 MG/DL (ref 8.5–10.5)
CALCIUM SERPL-MCNC: 9 MG/DL (ref 8.5–10.5)
CHLORIDE SERPL-SCNC: 108 MMOL/L (ref 96–112)
CHOLEST SERPL-MCNC: 161 MG/DL (ref 100–199)
CO2 SERPL-SCNC: 23 MMOL/L (ref 20–33)
CREAT SERPL-MCNC: 0.84 MG/DL (ref 0.5–1.4)
EOSINOPHIL # BLD AUTO: 0.1 K/UL (ref 0–0.51)
EOSINOPHIL NFR BLD: 1.8 % (ref 0–6.9)
ERYTHROCYTE [DISTWIDTH] IN BLOOD BY AUTOMATED COUNT: 42.2 FL (ref 35.9–50)
GFR SERPLBLD CREATININE-BSD FMLA CKD-EPI: 88 ML/MIN/1.73 M 2
GLOBULIN SER CALC-MCNC: 2.7 G/DL (ref 1.9–3.5)
GLUCOSE SERPL-MCNC: 87 MG/DL (ref 65–99)
HCT VFR BLD AUTO: 39.7 % (ref 37–47)
HDLC SERPL-MCNC: 45 MG/DL
HGB BLD-MCNC: 13.6 G/DL (ref 12–16)
IMM GRANULOCYTES # BLD AUTO: 0.02 K/UL (ref 0–0.11)
IMM GRANULOCYTES NFR BLD AUTO: 0.4 % (ref 0–0.9)
LDLC SERPL CALC-MCNC: 99 MG/DL
LYMPHOCYTES # BLD AUTO: 1.73 K/UL (ref 1–4.8)
LYMPHOCYTES NFR BLD: 30.4 % (ref 22–41)
MCH RBC QN AUTO: 32.1 PG (ref 27–33)
MCHC RBC AUTO-ENTMCNC: 34.3 G/DL (ref 32.2–35.5)
MCV RBC AUTO: 93.6 FL (ref 81.4–97.8)
MONOCYTES # BLD AUTO: 0.52 K/UL (ref 0–0.85)
MONOCYTES NFR BLD AUTO: 9.1 % (ref 0–13.4)
NEUTROPHILS # BLD AUTO: 3.29 K/UL (ref 1.82–7.42)
NEUTROPHILS NFR BLD: 57.8 % (ref 44–72)
NRBC # BLD AUTO: 0 K/UL
NRBC BLD-RTO: 0 /100 WBC (ref 0–0.2)
PLATELET # BLD AUTO: 204 K/UL (ref 164–446)
PMV BLD AUTO: 10.9 FL (ref 9–12.9)
POTASSIUM SERPL-SCNC: 4.7 MMOL/L (ref 3.6–5.5)
PROT SERPL-MCNC: 6.8 G/DL (ref 6–8.2)
RBC # BLD AUTO: 4.24 M/UL (ref 4.2–5.4)
SODIUM SERPL-SCNC: 140 MMOL/L (ref 135–145)
T3FREE SERPL-MCNC: 3.33 PG/ML (ref 2–4.4)
T4 FREE SERPL-MCNC: 1.33 NG/DL (ref 0.93–1.7)
TRIGL SERPL-MCNC: 84 MG/DL (ref 0–149)
TSH SERPL-ACNC: 0.84 UIU/ML (ref 0.38–5.33)
WBC # BLD AUTO: 5.7 K/UL (ref 4.8–10.8)

## 2025-04-19 PROCEDURE — 36415 COLL VENOUS BLD VENIPUNCTURE: CPT

## 2025-04-19 PROCEDURE — 84443 ASSAY THYROID STIM HORMONE: CPT

## 2025-04-19 PROCEDURE — 82306 VITAMIN D 25 HYDROXY: CPT

## 2025-04-19 PROCEDURE — 80053 COMPREHEN METABOLIC PANEL: CPT

## 2025-04-19 PROCEDURE — 80061 LIPID PANEL: CPT

## 2025-04-19 PROCEDURE — 84481 FREE ASSAY (FT-3): CPT

## 2025-04-19 PROCEDURE — 85025 COMPLETE CBC W/AUTO DIFF WBC: CPT

## 2025-04-19 PROCEDURE — 84439 ASSAY OF FREE THYROXINE: CPT

## 2025-07-01 ENCOUNTER — APPOINTMENT (OUTPATIENT)
Facility: MEDICAL CENTER | Age: 45
End: 2025-07-01
Attending: PHYSICIAN ASSISTANT
Payer: COMMERCIAL

## 2025-07-23 ENCOUNTER — HOSPITAL ENCOUNTER (OUTPATIENT)
Dept: LAB | Facility: MEDICAL CENTER | Age: 45
End: 2025-07-23
Attending: INTERNAL MEDICINE
Payer: COMMERCIAL

## 2025-07-23 LAB
BASOPHILS # BLD AUTO: 0.7 % (ref 0–1.8)
BASOPHILS # BLD: 0.04 K/UL (ref 0–0.12)
EOSINOPHIL # BLD AUTO: 0.08 K/UL (ref 0–0.51)
EOSINOPHIL NFR BLD: 1.4 % (ref 0–6.9)
ERYTHROCYTE [DISTWIDTH] IN BLOOD BY AUTOMATED COUNT: 42.6 FL (ref 35.9–50)
FERRITIN SERPL-MCNC: 20.2 NG/ML (ref 10–291)
HCT VFR BLD AUTO: 41.3 % (ref 37–47)
HGB BLD-MCNC: 14.1 G/DL (ref 12–16)
IMM GRANULOCYTES # BLD AUTO: 0.02 K/UL (ref 0–0.11)
IMM GRANULOCYTES NFR BLD AUTO: 0.4 % (ref 0–0.9)
IRON SATN MFR SERPL: 80 % (ref 15–55)
IRON SERPL-MCNC: 220 UG/DL (ref 40–170)
LYMPHOCYTES # BLD AUTO: 1.56 K/UL (ref 1–4.8)
LYMPHOCYTES NFR BLD: 27.7 % (ref 22–41)
MCH RBC QN AUTO: 32.1 PG (ref 27–33)
MCHC RBC AUTO-ENTMCNC: 34.1 G/DL (ref 32.2–35.5)
MCV RBC AUTO: 94.1 FL (ref 81.4–97.8)
MONOCYTES # BLD AUTO: 0.57 K/UL (ref 0–0.85)
MONOCYTES NFR BLD AUTO: 10.1 % (ref 0–13.4)
NEUTROPHILS # BLD AUTO: 3.37 K/UL (ref 1.82–7.42)
NEUTROPHILS NFR BLD: 59.7 % (ref 44–72)
NRBC # BLD AUTO: 0 K/UL
NRBC BLD-RTO: 0 /100 WBC (ref 0–0.2)
PLATELET # BLD AUTO: 232 K/UL (ref 164–446)
PMV BLD AUTO: 11.3 FL (ref 9–12.9)
RBC # BLD AUTO: 4.39 M/UL (ref 4.2–5.4)
TIBC SERPL-MCNC: 276 UG/DL (ref 250–450)
UIBC SERPL-MCNC: 56 UG/DL (ref 110–370)
WBC # BLD AUTO: 5.6 K/UL (ref 4.8–10.8)

## 2025-07-23 PROCEDURE — 83540 ASSAY OF IRON: CPT

## 2025-07-23 PROCEDURE — 82728 ASSAY OF FERRITIN: CPT

## 2025-07-23 PROCEDURE — 36415 COLL VENOUS BLD VENIPUNCTURE: CPT

## 2025-07-23 PROCEDURE — 85025 COMPLETE CBC W/AUTO DIFF WBC: CPT

## 2025-07-23 PROCEDURE — 83550 IRON BINDING TEST: CPT

## 2025-08-27 ENCOUNTER — HOSPITAL ENCOUNTER (OUTPATIENT)
Dept: RADIOLOGY | Facility: MEDICAL CENTER | Age: 45
End: 2025-08-27
Attending: PHYSICIAN ASSISTANT
Payer: COMMERCIAL

## 2025-08-27 VITALS — HEIGHT: 60 IN | WEIGHT: 130 LBS | BODY MASS INDEX: 25.52 KG/M2

## 2025-08-27 DIAGNOSIS — Z12.31 VISIT FOR SCREENING MAMMOGRAM: ICD-10-CM

## 2025-08-27 DIAGNOSIS — R92.30 DENSE BREAST: ICD-10-CM

## 2025-08-27 PROCEDURE — 76641 ULTRASOUND BREAST COMPLETE: CPT

## 2025-08-27 PROCEDURE — 77067 SCR MAMMO BI INCL CAD: CPT

## 2025-08-27 ASSESSMENT — FIBROSIS 4 INDEX: FIB4 SCORE: 0.89

## 2025-08-29 ENCOUNTER — EH NON-PROVIDER (OUTPATIENT)
Dept: OCCUPATIONAL MEDICINE | Facility: CLINIC | Age: 45
End: 2025-08-29

## (undated) DEVICE — SUTURE 0 VICRYL PLUS CT-1 - 36 INCH (36/BX)

## (undated) DEVICE — SODIUM CHL IRRIGATION 0.9% 1000ML (12EA/CA)

## (undated) DEVICE — TOWEL STOP TIMEOUT SAFETY FLAG (40EA/CA)

## (undated) DEVICE — KIT  I.V. START (100EA/CA)

## (undated) DEVICE — SUCTION INSTRUMENT YANKAUER BULBOUS TIP W/O VENT (50EA/CA)

## (undated) DEVICE — SET EXTENSION WITH 2 PORTS (48EA/CA) ***PART #2C8610 IS A SUBSTITUTE*****

## (undated) DEVICE — GLOVE BIOGEL SZ 7.5 SURGICAL PF LTX - (50PR/BX 4BX/CA)

## (undated) DEVICE — ELECTRODE 850 FOAM ADHESIVE - HYDROGEL RADIOTRNSPRNT (50/PK)

## (undated) DEVICE — PROTECTOR ULNA NERVE - (36PR/CA)

## (undated) DEVICE — GLOVE BIOGEL SZ 6.5 SURGICAL PF LTX (50PR/BX 4BX/CA)

## (undated) DEVICE — DETERGENT RENUZYME PLUS 10 OZ PACKET (50/BX)

## (undated) DEVICE — BOVIE NEEDLE TIP 3CM COLORADO

## (undated) DEVICE — GOWN WARMING STANDARD FLEX - (30/CA)

## (undated) DEVICE — RETRACTOR O C SECTION LRY - (5/BX)

## (undated) DEVICE — CATHETER IV NON-SAFETY 18 GA X 1 1/4 (50/BX 4BX/CA)

## (undated) DEVICE — BURR ROUND CUT MED 4.0

## (undated) DEVICE — PADDING CAST 4 IN STERILE - 4 X 4 YDS (24/CA)

## (undated) DEVICE — MASK ANESTHESIA ADULT  - (100/CA)

## (undated) DEVICE — CANISTER SUCTION RIGID RED 1500CC (40EA/CA)

## (undated) DEVICE — GLOVE BIOGEL INDICATOR SZ 7SURGICAL PF LTX - (50/BX 4BX/CA)

## (undated) DEVICE — SENSOR SPO2 NEO LNCS ADHESIVE (20/BX) SEE USER NOTES

## (undated) DEVICE — CHLORAPREP 26 ML APPLICATOR - ORANGE TINT(25/CA)

## (undated) DEVICE — WATER IRRIGATION STERILE 1000ML (12EA/CA)

## (undated) DEVICE — KIT ANESTHESIA W/CIRCUIT & 3/LT BAG W/FILTER (20EA/CA)

## (undated) DEVICE — PAD LAP STERILE 18 X 18 - (5/PK 40PK/CA)

## (undated) DEVICE — CONTAINER SPECIMEN BAG OR - STERILE 4 OZ W/LID (100EA/CA)

## (undated) DEVICE — PACK C-SECTION (2EA/CA)

## (undated) DEVICE — SUTURE 0 GUT-PLAIN (36PK/BX)

## (undated) DEVICE — STOCKINETTE, TUBULAR 4

## (undated) DEVICE — HEAD HOLDER JUNIOR/ADULT

## (undated) DEVICE — ELECTRODE DUAL RETURN W/ CORD - (50/PK)

## (undated) DEVICE — PACK LOWER EXTREMITY - (2/CA)

## (undated) DEVICE — CANISTER SUCTION 3000ML MECHANICAL FILTER AUTO SHUTOFF MEDI-VAC NONSTERILE LF DISP  (40EA/CA)

## (undated) DEVICE — LACTATED RINGERS INJ 1000 ML - (14EA/CA 60CA/PF)

## (undated) DEVICE — BLADE SURGICAL CLIPPER - (50EA/CA)

## (undated) DEVICE — DRESSING XEROFORM 1X8 - (50/BX 4BX/CA)

## (undated) DEVICE — TUBING CLEARLINK DUO-VENT - C-FLO (48EA/CA)

## (undated) DEVICE — SUTURE3-0 36IN VCRLY PLS ANTI (36PK/BX)

## (undated) DEVICE — CATHETER IV 20 GA X 1-1/4 ---SURG.& SDS ONLY--- (50EA/BX)

## (undated) DEVICE — KIT ROOM DECONTAMINATION

## (undated) DEVICE — TRAY SPINAL ANESTHESIA NON-SAFETY (10/CA)

## (undated) DEVICE — SUTURE 4-0 ETHILON FS-2 18 (36PK/BX)"

## (undated) DEVICE — DRESSING NON-ADHERING 8 X 3 - (50/BX)

## (undated) DEVICE — GLOVE BIOGEL INDICATOR SZ 8 SURGICAL PF LTX - (50/BX 4BX/CA)

## (undated) DEVICE — BANDAGE STERILE 2 IN X 75 IN (12EA/BX 8BX/CA)

## (undated) DEVICE — SET LEADWIRE 5 LEAD BEDSIDE DISPOSABLE ECG (1SET OF 5/EA)

## (undated) DEVICE — SUTURE 4-0 ETHILON FS-1 18 (36PK/BX)"

## (undated) DEVICE — DRESSING POST OP BORDER 4 X 10 (5EA/BX)

## (undated) DEVICE — SUTURE 3-0 MONOCRYL PLUS PS-2 - (12/BX)

## (undated) DEVICE — TUBE CONNECTING SUCTION - CLEAR PLASTIC STERILE 72 IN (50EA/CA)

## (undated) DEVICE — STAPLER SKIN DISP - (6/BX 10BX/CA) VISISTAT

## (undated) DEVICE — SUTURE GENERAL

## (undated) DEVICE — DRAPE STRLE REG TOWEL 18X24 - (10/BX 4BX/CA)"

## (undated) DEVICE — GLOVE BIOGEL SZ 6 PF LATEX - (50EA/BX 4BX/CA)